# Patient Record
Sex: FEMALE | Race: WHITE | NOT HISPANIC OR LATINO | Employment: FULL TIME | URBAN - NONMETROPOLITAN AREA
[De-identification: names, ages, dates, MRNs, and addresses within clinical notes are randomized per-mention and may not be internally consistent; named-entity substitution may affect disease eponyms.]

---

## 2017-01-02 ENCOUNTER — PRENATAL OFFICE VISIT (OUTPATIENT)
Dept: FAMILY MEDICINE | Facility: CLINIC | Age: 27
End: 2017-01-02
Payer: COMMERCIAL

## 2017-01-02 VITALS
OXYGEN SATURATION: 96 % | HEART RATE: 83 BPM | DIASTOLIC BLOOD PRESSURE: 64 MMHG | TEMPERATURE: 97.8 F | WEIGHT: 136 LBS | BODY MASS INDEX: 22.66 KG/M2 | SYSTOLIC BLOOD PRESSURE: 100 MMHG | HEIGHT: 65 IN

## 2017-01-02 DIAGNOSIS — Z34.02 PRENATAL CARE, FIRST PREGNANCY, SECOND TRIMESTER: ICD-10-CM

## 2017-01-02 DIAGNOSIS — Z34.03 PRENATAL CARE, FIRST PREGNANCY, THIRD TRIMESTER: Primary | ICD-10-CM

## 2017-01-02 LAB
GLUCOSE 1H P 50 G GLC PO SERPL-MCNC: 124 MG/DL (ref 60–129)
HGB BLD-MCNC: 12.3 G/DL (ref 11.7–15.7)

## 2017-01-02 PROCEDURE — 82950 GLUCOSE TEST: CPT | Mod: 90 | Performed by: FAMILY MEDICINE

## 2017-01-02 PROCEDURE — 99207 ZZC PRENATAL VISIT: CPT | Performed by: FAMILY MEDICINE

## 2017-01-02 PROCEDURE — 36415 COLL VENOUS BLD VENIPUNCTURE: CPT | Performed by: FAMILY MEDICINE

## 2017-01-02 PROCEDURE — 99000 SPECIMEN HANDLING OFFICE-LAB: CPT | Performed by: FAMILY MEDICINE

## 2017-01-02 PROCEDURE — 00000218 ZZHCL STATISTIC OBHBG - HEMOGLOBIN: Performed by: FAMILY MEDICINE

## 2017-01-02 PROCEDURE — H1000 PRENATAL CARE ATRISK ASSESSM: HCPCS | Performed by: FAMILY MEDICINE

## 2017-01-02 NOTE — NURSING NOTE
"Chief Complaint   Patient presents with     Prenatal Care     27w 3d        Initial /64 mmHg  Pulse 83  Temp(Src) 97.8  F (36.6  C) (Tympanic)  Ht 5' 4.5\" (1.638 m)  Wt 136 lb (61.689 kg)  BMI 22.99 kg/m2  SpO2 96%  LMP 06/25/2016 Estimated body mass index is 22.99 kg/(m^2) as calculated from the following:    Height as of this encounter: 5' 4.5\" (1.638 m).    Weight as of this encounter: 136 lb (61.689 kg).  BP completed using cuff size: regular    "

## 2017-01-02 NOTE — PROGRESS NOTES
Fatimah is here for her 26 week visit.  She is 27w3d weeks today.  Employment Status:  Full time  Is a .  She is not attending classes.    Fatimah understands the following:  Definition of  labor:   Yes  Warning signs of  labor:  Yes  Palpatation of uterine contractions:  Yes  Warning signs of pregnancy complications:  Yes  Weight gain:  Yes  Nutrition choices (review intake and risks):  Yes  Prevention/treatment of constipation (increase fluids/fiber):  Yes  Discontinue smoking, drug use, ETOH use:  Yes  Decrease strenuous activities (increase rest):  Yes  Consider work/activity/environmental hazards:  Yes  Travel recommendations:  Yes  Sexual activity/intercourse:  Yes  Avoid nipple stimulation:  No  Infant feeding plans:  Yes breast feeding  Car seat:  Yes  Fetal kick counts discussed:  No  Other topics discussed:      Just did her GTT dose    Cvx: closed, long, and thick. 0/0/0    Class schedule given for 2017    Return to clinic 3-4 weeks    Renetta Vegas MD

## 2017-01-02 NOTE — MR AVS SNAPSHOT
After Visit Summary   1/2/2017    Fatimah Roy    MRN: [id number]           Patient Information     Date Of Birth          1990        Visit Information        Provider Department      1/2/2017 1:00 PM Renetta Monroy MD [facility]        Today's Diagnoses     Prenatal care, first pregnancy, third trimester    -  1       Care Instructions    Return to clinic 3-4 weeks        Follow-ups after your visit        Future tests that were ordered for you today     Open Future Orders        Priority Expected Expires Ordered    Glucose tolerance gest screen 1 hour Routine  1/31/2017 1/2/2017    OB hemoglobin Routine  1/31/2017 1/2/2017            Who to contact     If you have questions or need follow up information about today's clinic visit or your schedule please contact [facility] directly at [phone].  Normal or non-critical lab and imaging results will be communicated to you by Nomihart, letter or phone within 4 business days after the clinic has received the results. If you do not hear from us within 7 days, please contact the clinic through Nomihart or phone. If you have a critical or abnormal lab result, we will notify you by phone as soon as possible.  Submit refill requests through Greenopedia or call your pharmacy and they will forward the refill request to us. Please allow 3 business days for your refill to be completed.          Additional Information About Your Visit        MyChart Information     Greenopedia gives you secure access to your electronic health record. If you see a primary care provider, you can also send messages to your care team and make appointments. If you have questions, please call your primary care clinic.  If you do not have a primary care provider, please call [phone] and they will assist you.        Your Vitals Were     Pulse Temperature Height BMI (Body Mass Index) Pulse Oximetry Last Period    83 97.8  F (36.6  C) (Tympanic)  "5' 4.5\" (1.638 m) 22.99 kg/m2 96% 06/25/2016       Blood Pressure from Last 3 Encounters:   01/02/17 100/64   11/29/16 102/62   10/17/16 100/62    Weight from Last 3 Encounters:   01/02/17 136 lb (61.689 kg)   11/29/16 127 lb (57.607 kg)   10/17/16 116 lb (52.617 kg)              We Performed the Following     RISK ASSESSMANT FORM-NORMAL PREGNANCY (to be charged at 28 weeks)        Primary Care Provider Office Phone # Fax #    Kristen Laurent HECTOR Manrique [facility] [phone]       [facility] [address] [facility] [address]        Thank you!     Thank you for choosing [facility]  for your care. Our goal is always to provide you with excellent care. Hearing back from our patients is one way we can continue to improve our services. Please take a few minutes to complete the written survey that you may receive in the mail after your visit with us. Thank you!             Your Updated Medication List - Protect others around you: Learn how to safely use, store and throw away your medicines at www.disposemymeds.org.          This list is accurate as of: 1/2/17  1:41 PM.  Always use your most recent med list.                   Brand Name Dispense Instructions for use    albuterol 108 (90 BASE) MCG/ACT Inhaler    PROAIR HFA/PROVENTIL HFA/VENTOLIN HFA    1 Inhaler    Inhale 2 puffs into the lungs every 6 hours as needed for shortness of breath / dyspnea       fluticasone 50 MCG/ACT spray    FLONASE    1 Bottle    Spray 2 sprays into both nostrils daily       prenatal multivitamin  plus iron 27-0.8 MG Tabs per tablet      Take 1 tablet by mouth daily         "

## 2017-01-09 ENCOUNTER — OFFICE VISIT (OUTPATIENT)
Dept: FAMILY MEDICINE | Facility: CLINIC | Age: 27
End: 2017-01-09
Payer: COMMERCIAL

## 2017-01-09 VITALS
TEMPERATURE: 97.8 F | SYSTOLIC BLOOD PRESSURE: 102 MMHG | BODY MASS INDEX: 23.84 KG/M2 | HEART RATE: 80 BPM | DIASTOLIC BLOOD PRESSURE: 60 MMHG | WEIGHT: 141 LBS

## 2017-01-09 DIAGNOSIS — R10.2 PELVIC CRAMPING IN ANTEPARTUM PERIOD: ICD-10-CM

## 2017-01-09 DIAGNOSIS — R31.29 MICROSCOPIC HEMATURIA: ICD-10-CM

## 2017-01-09 DIAGNOSIS — O26.899 PELVIC CRAMPING IN ANTEPARTUM PERIOD: ICD-10-CM

## 2017-01-09 DIAGNOSIS — Z34.02 PRENATAL CARE, FIRST PREGNANCY, SECOND TRIMESTER: Primary | ICD-10-CM

## 2017-01-09 LAB
ALBUMIN UR-MCNC: ABNORMAL MG/DL
AMORPH CRY #/AREA URNS HPF: ABNORMAL /HPF
APPEARANCE UR: CLEAR
BACTERIA #/AREA URNS HPF: ABNORMAL /HPF
BILIRUB UR QL STRIP: NEGATIVE
COLOR UR AUTO: YELLOW
GLUCOSE UR STRIP-MCNC: NEGATIVE MG/DL
HGB UR QL STRIP: ABNORMAL
KETONES UR STRIP-MCNC: NEGATIVE MG/DL
LEUKOCYTE ESTERASE UR QL STRIP: NEGATIVE
NITRATE UR QL: NEGATIVE
NON-SQ EPI CELLS #/AREA URNS LPF: ABNORMAL /LPF
PH UR STRIP: 6.5 PH (ref 5–7)
RBC #/AREA URNS AUTO: ABNORMAL /HPF (ref 0–2)
SP GR UR STRIP: 1.02 (ref 1–1.03)
URN SPEC COLLECT METH UR: ABNORMAL
UROBILINOGEN UR STRIP-ACNC: 0.2 EU/DL (ref 0.2–1)
WBC #/AREA URNS AUTO: ABNORMAL /HPF (ref 0–2)

## 2017-01-09 PROCEDURE — 81001 URINALYSIS AUTO W/SCOPE: CPT | Performed by: FAMILY MEDICINE

## 2017-01-09 PROCEDURE — 99207 ZZC PRENATAL VISIT: CPT | Performed by: FAMILY MEDICINE

## 2017-01-09 PROCEDURE — 99000 SPECIMEN HANDLING OFFICE-LAB: CPT | Performed by: FAMILY MEDICINE

## 2017-01-09 PROCEDURE — 87086 URINE CULTURE/COLONY COUNT: CPT | Mod: 90 | Performed by: FAMILY MEDICINE

## 2017-01-09 NOTE — NURSING NOTE
"Chief Complaint   Patient presents with     Prenatal Care     28w 3d        Initial /60 mmHg  Pulse 80  Temp(Src) 97.8  F (36.6  C) (Tympanic)  Wt 141 lb (63.957 kg)  LMP 06/25/2016 Estimated body mass index is 23.84 kg/(m^2) as calculated from the following:    Height as of 1/2/17: 5' 4.5\" (1.638 m).    Weight as of this encounter: 141 lb (63.957 kg).  BP completed using cuff size: regular    "

## 2017-01-09 NOTE — PROGRESS NOTES
28w3d   Having pelvic cramping that started around 5 pm yesterday, lasting until this am. No bleeding or fluid leak. Had to lay down. Was painful cramping, uterus didn't get hard. Tried hot bath.   No diarrhea.   No dysuria, some frequency.  Called and we asked her to be seen.   Cvx: 0/0/-4  Results for orders placed or performed in visit on 01/09/17   *UA reflex to Microscopic and Culture ([facility] (except [place] and [place])   Result Value Ref Range    Color Urine Yellow     Appearance Urine Clear     Glucose Urine Negative NEG mg/dL    Bilirubin Urine Negative NEG    Ketones Urine Negative NEG mg/dL    Specific Gravity Urine 1.025 1.003 - 1.035    Blood Urine Moderate (A) NEG    pH Urine 6.5 5.0 - 7.0 pH    Protein Albumin Urine Trace (A) NEG mg/dL    Urobilinogen Urine 0.2 0.2 - 1.0 EU/dL    Nitrite Urine Negative NEG    Leukocyte Esterase Urine Negative NEG    Source Midstream Urine    Urine Microscopic   Result Value Ref Range    WBC Urine O - 2 0 - 2 /HPF    RBC Urine 10-25 (A) 0 - 2 /HPF    Squamous Epithelial /LPF Urine Few FEW /LPF    Bacteria Urine Few (A) NEG /HPF    Amorphous Crystals Moderate (A) NEG /HPF   Urine Culture Aerobic Bacterial   Result Value Ref Range    Specimen Description Midstream Urine     Culture Micro No growth after 11 hours     Micro Report Status Pending       No cervical change  Patient Instructions   Labor and Delivery  [phone]    Drink lots of water  urine culture is pending  Return to clinic as planned later this month    MD Renetta Ledesma MD

## 2017-01-09 NOTE — MR AVS SNAPSHOT
After Visit Summary   1/9/2017    Fatimah Roy    MRN: [id number]           Patient Information     Date Of Birth          1990        Visit Information        Provider Department      1/9/2017 2:20 PM Renetta Monroy MD [facility]        Today's Diagnoses     Prenatal care, first pregnancy, second trimester    -  1     Pelvic cramping in antepartum period         Microscopic hematuria           Care Instructions    Labor and Delivery  [phone]    Drink lots of water  urine culture is pending  Return to clinic as planned later this month        Follow-ups after your visit        Your next 10 appointments already scheduled     Jan 24, 2017  4:20 PM   ESTABLISHED PRENATAL with Renetta Monroy MD   [facility] ([facility])    [address] [facility] [address] [phone]              Who to contact     If you have questions or need follow up information about today's clinic visit or your schedule please contact [facility] directly at [phone].  Normal or non-critical lab and imaging results will be communicated to you by Xplornet Communicationshart, letter or phone within 4 business days after the clinic has received the results. If you do not hear from us within 7 days, please contact the clinic through dev9k or phone. If you have a critical or abnormal lab result, we will notify you by phone as soon as possible.  Submit refill requests through dev9k or call your pharmacy and they will forward the refill request to us. Please allow 3 business days for your refill to be completed.          Additional Information About Your Visit        Xplornet Communicationshart Information     dev9k gives you secure access to your electronic health record. If you see a primary care provider, you can also send messages to your care team and make appointments. If you have questions, please call your primary care clinic.  If you do not have a primary care  provider, please call [phone] and they will assist you.        Care EveryWhere ID     This is your Care EveryWhere ID. This could be used by other organizations to access your [place] medical records  [id number]        Your Vitals Were     Pulse Temperature Last Period             80 97.8  F (36.6  C) (Tympanic) 06/25/2016          Blood Pressure from Last 3 Encounters:   01/09/17 102/60   01/02/17 100/64   11/29/16 102/62    Weight from Last 3 Encounters:   01/09/17 141 lb (63.957 kg)   01/02/17 136 lb (61.689 kg)   11/29/16 127 lb (57.607 kg)              We Performed the Following     *UA reflex to Microscopic and Culture ([facility] (except [place] and [place])     Urine Culture Aerobic Bacterial     Urine Microscopic        Primary Care Provider Office Phone # Fax #    HECTOR Trimble [facility] [phone]       [facility] [address] [place] [address]        Thank you!     Thank you for choosing [facility]  for your care. Our goal is always to provide you with excellent care. Hearing back from our patients is one way we can continue to improve our services. Please take a few minutes to complete the written survey that you may receive in the mail after your visit with us. Thank you!             Your Updated Medication List - Protect others around you: Learn how to safely use, store and throw away your medicines at www.disposemymeds.org.          This list is accurate as of: 1/9/17  3:18 PM.  Always use your most recent med list.                   Brand Name Dispense Instructions for use    albuterol 108 (90 BASE) MCG/ACT Inhaler    PROAIR HFA/PROVENTIL HFA/VENTOLIN HFA    1 Inhaler    Inhale 2 puffs into the lungs every 6 hours as needed for shortness of breath / dyspnea       fluticasone 50 MCG/ACT spray    FLONASE    1 Bottle    Spray 2 sprays into both nostrils daily       prenatal multivitamin  plus iron 27-0.8 MG  Tabs per tablet      Take 1 tablet by mouth daily

## 2017-01-09 NOTE — PATIENT INSTRUCTIONS
Labor and Delivery  [phone]    Drink lots of water  urine culture is pending  Return to clinic as planned later this month

## 2017-01-11 LAB
BACTERIA SPEC CULT: NORMAL
MICRO REPORT STATUS: NORMAL
SPECIMEN SOURCE: NORMAL

## 2017-01-24 ENCOUNTER — PRENATAL OFFICE VISIT (OUTPATIENT)
Dept: FAMILY MEDICINE | Facility: CLINIC | Age: 27
End: 2017-01-24
Payer: COMMERCIAL

## 2017-01-24 VITALS
HEART RATE: 94 BPM | DIASTOLIC BLOOD PRESSURE: 68 MMHG | WEIGHT: 146 LBS | OXYGEN SATURATION: 96 % | SYSTOLIC BLOOD PRESSURE: 102 MMHG | BODY MASS INDEX: 24.68 KG/M2 | TEMPERATURE: 97.4 F

## 2017-01-24 DIAGNOSIS — Z23 NEED FOR VACCINATION: Primary | ICD-10-CM

## 2017-01-24 PROCEDURE — 99207 ZZC PRENATAL VISIT: CPT | Performed by: FAMILY MEDICINE

## 2017-01-24 PROCEDURE — 90471 IMMUNIZATION ADMIN: CPT | Performed by: FAMILY MEDICINE

## 2017-01-24 PROCEDURE — 90715 TDAP VACCINE 7 YRS/> IM: CPT | Performed by: FAMILY MEDICINE

## 2017-01-24 NOTE — MR AVS SNAPSHOT
After Visit Summary   1/24/2017    Fatimah Roy    MRN: [id number]           Patient Information     Date Of Birth          1990        Visit Information        Provider Department      1/24/2017 4:20 PM Renetta Monroy MD [facility]        Care Instructions    See you next Friday        Follow-ups after your visit        Who to contact     If you have questions or need follow up information about today's clinic visit or your schedule please contact [facility] directly at [phone].  Normal or non-critical lab and imaging results will be communicated to you by MyChart, letter or phone within 4 business days after the clinic has received the results. If you do not hear from us within 7 days, please contact the clinic through AgroSavfet or phone. If you have a critical or abnormal lab result, we will notify you by phone as soon as possible.  Submit refill requests through Anemoi Renovables or call your pharmacy and they will forward the refill request to us. Please allow 3 business days for your refill to be completed.          Additional Information About Your Visit        MyChart Information     An[employer]oi [employer]ovables gives you secure access to your electronic health record. If you see a primary care provider, you can also send messages to your care team and make appointments. If you have questions, please call your primary care clinic.  If you do not have a primary care provider, please call [phone] and they will assist you.        Care EveryWhere ID     This is your Care EveryWhere ID. This could be used by other organizations to access your [place] medical records  [id number]        Your Vitals Were     Pulse Temperature Pulse Oximetry Last Period          94 97.4  F (36.3  C) (Tympanic) 96% 06/25/2016         Blood Pressure from Last 3 Encounters:   01/24/17 102/68   01/09/17 102/60   01/02/17 100/64    Weight from Last 3 Encounters:   01/24/17 146 lb (66.225 kg)    01/09/17 141 lb (63.957 kg)   01/02/17 136 lb (61.689 kg)              Today, you had the following     No orders found for display       Primary Care Provider Office Phone # Fax #    Kristen Manrique HECTOR [facility] [phone]       [facility] [address] [facility] [address]        Thank you!     Thank you for choosing [facility]  for your care. Our goal is always to provide you with excellent care. Hearing back from our patients is one way we can continue to improve our services. Please take a few minutes to complete the written survey that you may receive in the mail after your visit with us. Thank you!             Your Updated Medication List - Protect others around you: Learn how to safely use, store and throw away your medicines at www.disposemymeds.org.          This list is accurate as of: 1/24/17  4:38 PM.  Always use your most recent med list.                   Brand Name Dispense Instructions for use    albuterol 108 (90 BASE) MCG/ACT Inhaler    PROAIR HFA/PROVENTIL HFA/VENTOLIN HFA    1 Inhaler    Inhale 2 puffs into the lungs every 6 hours as needed for shortness of breath / dyspnea       fluticasone 50 MCG/ACT spray    FLONASE    1 Bottle    Spray 2 sprays into both nostrils daily       prenatal multivitamin  plus iron 27-0.8 MG Tabs per tablet      Take 1 tablet by mouth daily

## 2017-01-24 NOTE — NURSING NOTE
"Chief Complaint   Patient presents with     Prenatal Care     30w 4d     /68 mmHg  Pulse 94  Temp(Src) 97.4  F (36.3  C) (Tympanic)  Wt 146 lb (66.225 kg)  SpO2 96%  LMP 06/25/2016 Estimated body mass index is 24.68 kg/(m^2) as calculated from the following:    Height as of 1/2/17: 5' 4.5\" (1.638 m).    Weight as of this encounter: 146 lb (66.225 kg).  bp completed using cuff size: regular        "

## 2017-02-07 ENCOUNTER — MYC MEDICAL ADVICE (OUTPATIENT)
Dept: FAMILY MEDICINE | Facility: CLINIC | Age: 27
End: 2017-02-07

## 2017-02-07 NOTE — TELEPHONE ENCOUNTER
Please review and advise message below.  Is this letter ok and for this date?    Thank you  Kristen LIZARRAGA RN

## 2017-02-07 NOTE — Clinical Note
[facility]  [address] [facility] [address]  Phone: [phone]    February 9, 2017    Fatimah Roy  [address] [facility] [address]            To Whom it May Concern,    Fatimah Roy is pregnant. Her maternity leave will begin March 27, 2017      Sincerely,      Renetta Vegas MD/Kristen TRINH

## 2017-02-13 ENCOUNTER — PRENATAL OFFICE VISIT (OUTPATIENT)
Dept: FAMILY MEDICINE | Facility: CLINIC | Age: 27
End: 2017-02-13
Payer: COMMERCIAL

## 2017-02-13 VITALS
WEIGHT: 151 LBS | OXYGEN SATURATION: 99 % | SYSTOLIC BLOOD PRESSURE: 102 MMHG | HEART RATE: 84 BPM | TEMPERATURE: 97.5 F | DIASTOLIC BLOOD PRESSURE: 68 MMHG | BODY MASS INDEX: 25.52 KG/M2

## 2017-02-13 DIAGNOSIS — Z34.03 PRENATAL CARE, FIRST PREGNANCY, THIRD TRIMESTER: Primary | ICD-10-CM

## 2017-02-13 PROCEDURE — 99207 ZZC PRENATAL VISIT: CPT | Performed by: FAMILY MEDICINE

## 2017-02-13 RX ORDER — LORATADINE 10 MG/1
10 TABLET ORAL DAILY
COMMUNITY
End: 2017-08-18

## 2017-02-13 NOTE — LETTER
[facility]  [address]  [facility] [address]  Phone: [phone]    February 13, 2017    Re: Fatimah Roy  [address] [facility] [address]              To Whom It May Concern:         I'm writing this letter on behalf of my patient, Fatimah Roy. This is to confirm that she is pregnant, and her Estimated Date of Delivery: Mar 31, 2017.   She anticipates working until March 27, 2017.       It is my pleasure to participate in Fatimah's health care needs. Please feel free to call if any questions or concerns.             Sincerely,            eRnetta Vegas MD

## 2017-02-13 NOTE — NURSING NOTE
"Chief Complaint   Patient presents with     Prenatal Care     33w 3d        Initial /68 (BP Location: Left arm, Patient Position: Chair, Cuff Size: Adult Regular)  Pulse 84  Temp 97.5  F (36.4  C) (Tympanic)  Wt 151 lb (68.5 kg)  LMP 06/25/2016  SpO2 99%  BMI 25.52 kg/m2 Estimated body mass index is 25.52 kg/(m^2) as calculated from the following:    Height as of 1/2/17: 5' 4.5\" (1.638 m).    Weight as of this encounter: 151 lb (68.5 kg).  Medication Reconciliation: complete    Health Maintenance that is potentially due pending provider review:  NONE    n/a      "

## 2017-02-13 NOTE — PROGRESS NOTES
33w3d  She reports a lot of swelling in her legs and hands. Her joints have been achy as well. Is on her feet all day teaching.  No contractions, bleeding, or fluid leak.  Return to clinic 2 weeks  Make ob apt  Renetta Vegas MD

## 2017-02-13 NOTE — MR AVS SNAPSHOT
After Visit Summary   2/13/2017    Fatimah Roy    MRN: [id number]           Patient Information     Date Of Birth          1990        Visit Information        Provider Department      2/13/2017 5:20 PM Renetta Monroy MD [facility]        Today's Diagnoses     Prenatal care, first pregnancy, third trimester    -  1      Care Instructions    See you in 2 weeks        Follow-ups after your visit        Additional Services     OB/GYN REFERRAL       Your provider has referred you to:  [facility]: [facility] [phone]   [email or web address]    Please be aware that coverage of these services is subject to the terms and limitations of your health insurance plan.  Call member services at your health plan with any benefit or coverage questions.      Please bring the following with you to your appointment:    (1) Any X-Rays, CTs or MRIs which have been performed.  Contact the facility where they were done to arrange for  prior to your scheduled appointment.   (2) List of current medications   (3) This referral request   (4) Any documents/labs given to you for this referral                  Follow-up notes from your care team     Return in about 2 weeks (around 2/27/2017).      Who to contact     If you have questions or need follow up information about today's clinic visit or your schedule please contact [facility] directly at [phone].  Normal or non-critical lab and imaging results will be communicated to you by MyChart, letter or phone within 4 business days after the clinic has received the results. If you do not hear from us within 7 days, please contact the clinic through MyChart or phone. If you have a critical or abnormal lab result, we will notify you by phone as soon as possible.  Submit refill requests through RotaPost or call your pharmacy and they will forward the refill request to us. Please allow 3  business days for your refill to be completed.          Additional Information About Your Visit        MyChart Information     Media Chaperone gives you secure access to your electronic health record. If you see a primary care provider, you can also send messages to your care team and make appointments. If you have questions, please call your primary care clinic.  If you do not have a primary care provider, please call [phone] and they will assist you.        Care EveryWhere ID     This is your Care EveryWhere ID. This could be used by other organizations to access your [place] medical records  [id number]        Your Vitals Were     Pulse Temperature Last Period Pulse Oximetry BMI (Body Mass Index)       84 97.5  F (36.4  C) (Tympanic) 06/25/2016 99% 25.52 kg/m2        Blood Pressure from Last 3 Encounters:   02/13/17 102/68   01/24/17 102/68   01/09/17 102/60    Weight from Last 3 Encounters:   02/13/17 151 lb (68.5 kg)   01/24/17 146 lb (66.2 kg)   01/09/17 141 lb (64 kg)              We Performed the Following     OB/GYN REFERRAL        Primary Care Provider Office Phone # Fax #    HECTOR Trimble [facility] [phone]       [facility] [address] [place] [address]        Thank you!     Thank you for choosing [facility]  for your care. Our goal is always to provide you with excellent care. Hearing back from our patients is one way we can continue to improve our services. Please take a few minutes to complete the written survey that you may receive in the mail after your visit with us. Thank you!             Your Updated Medication List - Protect others around you: Learn how to safely use, store and throw away your medicines at www.disposemymeds.org.          This list is accurate as of: 2/13/17  6:14 PM.  Always use your most recent med list.                   Brand Name Dispense Instructions for use    albuterol 108 (90 BASE) MCG/ACT Inhaler    PROAIR  HFA/PROVENTIL HFA/VENTOLIN HFA    1 Inhaler    Inhale 2 puffs into the lungs every 6 hours as needed for shortness of breath / dyspnea       CLARITIN 10 MG tablet   Generic drug:  loratadine      Take 10 mg by mouth daily       fluticasone 50 MCG/ACT spray    FLONASE    1 Bottle    Spray 2 sprays into both nostrils daily       prenatal multivitamin  plus iron 27-0.8 MG Tabs per tablet      Take 1 tablet by mouth daily

## 2017-03-03 ENCOUNTER — PRENATAL OFFICE VISIT (OUTPATIENT)
Dept: FAMILY MEDICINE | Facility: CLINIC | Age: 27
End: 2017-03-03
Payer: COMMERCIAL

## 2017-03-03 VITALS
WEIGHT: 160 LBS | TEMPERATURE: 97.7 F | HEART RATE: 80 BPM | SYSTOLIC BLOOD PRESSURE: 102 MMHG | BODY MASS INDEX: 27.04 KG/M2 | DIASTOLIC BLOOD PRESSURE: 64 MMHG

## 2017-03-03 DIAGNOSIS — Z34.03 PRENATAL CARE, FIRST PREGNANCY, THIRD TRIMESTER: Primary | ICD-10-CM

## 2017-03-03 PROCEDURE — 99207 ZZC PRENATAL VISIT: CPT | Performed by: FAMILY MEDICINE

## 2017-03-03 PROCEDURE — 87653 STREP B DNA AMP PROBE: CPT | Performed by: FAMILY MEDICINE

## 2017-03-03 NOTE — MR AVS SNAPSHOT
After Visit Summary   3/3/2017    Fatimah Roy    MRN: [id number]           Patient Information     Date Of Birth          1990        Visit Information        Provider Department      3/3/2017 3:00 PM Renetta Monroy MD [facility]        Today's Diagnoses     Prenatal care, first pregnancy, third trimester    -  1      Care Instructions    Apt with OB weekly from now on  Can't wait to see the baby!!        Follow-ups after your visit        Additional Services     OB/GYN REFERRAL       Your provider has referred you to:  [facility]: [facility] [phone]   [email or web address]    Please be aware that coverage of these services is subject to the terms and limitations of your health insurance plan.  Call member services at your health plan with any benefit or coverage questions.      Please bring the following to your appointment:  >>   Any x-rays, CTs or MRIs which have been performed.  Contact the facility where they were done to arrange for  prior to your scheduled appointment.  Any new CT, MRI or other procedures ordered by your specialist must be performed at a [facility] facility or coordinated by your clinic's referral office.    >>   List of current medications   >>   This referral request   >>   Any documents/labs given to you for this referral                  Who to contact     If you have questions or need follow up information about today's clinic visit or your schedule please contact [facility] directly at [phone].  Normal or non-critical lab and imaging results will be communicated to you by MyChart, letter or phone within 4 business days after the clinic has received the results. If you do not hear from us within 7 days, please contact the clinic through MyChart or phone. If you have a critical or abnormal lab result, we will notify you by phone as soon as possible.  Submit refill requests  through [employer] or call your pharmacy and they will forward the refill request to us. Please allow 3 business days for your refill to be completed.          Additional Information About Your Visit        [email or web address] Information     [employer] gives you secure access to your electronic health record. If you see a primary care provider, you can also send messages to your care team and make appointments. If you have questions, please call your primary care clinic.  If you do not have a primary care provider, please call [phone] and they will assist you.        Care EveryWhere ID     This is your Care EveryWhere ID. This could be used by other organizations to access your [place] medical records  [id number]        Your Vitals Were     Pulse Temperature Last Period BMI (Body Mass Index)          80 97.7  F (36.5  C) (Tympanic) 06/25/2016 27.04 kg/m2         Blood Pressure from Last 3 Encounters:   03/03/17 102/64   02/13/17 102/68   01/24/17 102/68    Weight from Last 3 Encounters:   03/03/17 160 lb (72.6 kg)   02/13/17 151 lb (68.5 kg)   01/24/17 146 lb (66.2 kg)              We Performed the Following     OB/GYN REFERRAL     Strep, Group B by [facility]        Primary Care Provider Office Phone # Fax #    HECTOR Trimble [facility] [phone]       [facility] [address] [place] [address]        Thank you!     Thank you for choosing [facility]  for your care. Our goal is always to provide you with excellent care. Hearing back from our patients is one way we can continue to improve our services. Please take a few minutes to complete the written survey that you may receive in the mail after your visit with us. Thank you!             Your Updated Medication List - Protect others around you: Learn how to safely use, store and throw away your medicines at www.disposemymeds.org.          This list is accurate as of: 3/3/17  3:11 PM.  Always use your most recent med list.                    Brand Name Dispense Instructions for use    albuterol 108 (90 BASE) MCG/ACT Inhaler    PROAIR HFA/PROVENTIL HFA/VENTOLIN HFA    1 Inhaler    Inhale 2 puffs into the lungs every 6 hours as needed for shortness of breath / dyspnea       CLARITIN 10 MG tablet   Generic drug:  loratadine      Take 10 mg by mouth daily       fluticasone 50 MCG/ACT spray    FLONASE    1 Bottle    Spray 2 sprays into both nostrils daily       prenatal multivitamin  plus iron 27-0.8 MG Tabs per tablet      Take 1 tablet by mouth daily

## 2017-03-03 NOTE — PROGRESS NOTES
36w0d  No contractions, bleeding, fluid leak.  Group Beta Strep taken  Cvx: 0/50/-3  Will follow up with OB from now on    Renetta Monroy MD   [facility]

## 2017-03-03 NOTE — NURSING NOTE
"Chief Complaint   Patient presents with     Prenatal Care     36w 0d        Initial /64 (BP Location: Right arm, Patient Position: Chair, Cuff Size: Adult Regular)  Pulse 80  Temp 97.7  F (36.5  C) (Tympanic)  Wt 160 lb (72.6 kg)  LMP 06/25/2016  BMI 27.04 kg/m2 Estimated body mass index is 27.04 kg/(m^2) as calculated from the following:    Height as of 1/2/17: 5' 4.5\" (1.638 m).    Weight as of this encounter: 160 lb (72.6 kg).  Medication Reconciliation: complete    Health Maintenance that is potentially due pending provider review:  NONE    n/a    "

## 2017-03-04 LAB
GP B STREP DNA SPEC QL NAA+PROBE: NORMAL
SPECIMEN SOURCE: NORMAL

## 2017-03-08 ENCOUNTER — PRENATAL OFFICE VISIT (OUTPATIENT)
Dept: OBGYN | Facility: CLINIC | Age: 27
End: 2017-03-08
Payer: COMMERCIAL

## 2017-03-08 VITALS
SYSTOLIC BLOOD PRESSURE: 121 MMHG | HEART RATE: 81 BPM | HEIGHT: 65 IN | WEIGHT: 161.6 LBS | BODY MASS INDEX: 26.92 KG/M2 | DIASTOLIC BLOOD PRESSURE: 75 MMHG

## 2017-03-08 DIAGNOSIS — Z34.03 PRENATAL CARE, FIRST PREGNANCY, THIRD TRIMESTER: Primary | ICD-10-CM

## 2017-03-08 PROCEDURE — 99207 ZZC PRENATAL VISIT: CPT | Performed by: OBSTETRICS & GYNECOLOGY

## 2017-03-08 NOTE — MR AVS SNAPSHOT
After Visit Summary   3/8/2017    Fatimah Roy    MRN: [id number]           Patient Information     Date Of Birth          1990        Visit Information        Provider Department      3/8/2017 2:30 PM Alfredo Ruff MD [facility]        Today's Diagnoses     Prenatal care, first pregnancy, third trimester    -  1       Follow-ups after your visit        Follow-up notes from your care team     Return in about 1 week (around 3/15/2017).      Your next 10 appointments already scheduled     Mar 16, 2017  3:30 PM CDT   ESTABLISHED PRENAT[facility]uent[facility]    [address] [address]   [phone]            Mar 23, 2017  4:00 PM CDT   ESTABLISHED PRENATAL with Chey Gilbert MD   [facility]    [address] [address]   [phone]            Mar 30, 2017  1:30 PM CDT   ESTABLISHED PRENATAL with Alfredo Ruff MD   [facility]    [address] [address]   [phone]              Who to contact     If you have questions or need follow up information about today's clinic visit or your schedule please contact [facility] directly at [phone].  Normal or non-critical lab and imaging results will be communicated to you by MyChart, letter or phone within 4 business days after the clinic has received the results. If you do not hear from us within 7 days, please contact the clinic through MyChart or phone. If you have a critical or abnormal lab result, we will notify you by phone as soon as possible.  Submit refill requests through [facility] [employer] or call your pharmacy and they will forward the refill request to us. Please allow 3 business days for your refill to be completed.          Additional Information About Your Visit        MyChart  "Information     [employer] gives you secure access to your electronic health record. If you see a primary care provider, you can also send messages to your care team and make appointments. If you have questions, please call your primary care clinic.  If you do not have a primary care provider, please call [phone] and they will assist you.        Care EveryWhere ID     This is your Care EveryWhere ID. This could be used by other organizations to access your [place] medical records  [id number]        Your Vitals Were     Pulse Height Last Period BMI (Body Mass Index)          81 5' 4.5\" (1.638 m) 06/25/2016 27.31 kg/m2         Blood Pressure from Last 3 Encounters:   03/08/17 121/75   03/03/17 102/64   02/13/17 102/68    Weight from Last 3 Encounters:   03/08/17 161 lb 9.6 oz (73.3 kg)   03/03/17 160 lb (72.6 kg)   02/13/17 151 lb (68.5 kg)              Today, you had the following     No orders found for display         Today's Medication Changes          These changes are accurate as of: 3/8/17 11:59 PM.  If you have any questions, ask your nurse or doctor.               Stop taking these medicines if you haven't already. Please contact your care team if you have questions.     fluticasone 50 MCG/ACT spray   Commonly known as:  FLONASE   Stopped by:  Alfredo Ruff MD                    Primary Care Provider Office Phone # Fax #    Kristen HECTOR [facility] [phone]       [address] [address]        Thank you!     Thank you for choosing [facility]  for your care. Our goal is always to provide you with excellent care. Hearing back from our patients is one way we can continue to improve our services. Please take a few minutes to complete the written survey that you may receive in the mail after your visit with us. Thank you!             Your Updated Medication List - Protect others around you: Learn how to safely use, store and " throw away your medicines at www.disposemymeds.org.          This list is accurate as of: 3/8/17 11:59 PM.  Always use your most recent med list.                   Brand Name Dispense Instructions for use    albuterol 108 (90 BASE) MCG/ACT Inhaler    PROAIR HFA/PROVENTIL HFA/VENTOLIN HFA    1 Inhaler    Inhale 2 puffs into the lungs every 6 hours as needed for shortness of breath / dyspnea       CLARITIN 10 MG tablet   Generic drug:  loratadine      Take 10 mg by mouth daily       prenatal multivitamin  plus iron 27-0.8 MG Tabs per tablet      Take 1 tablet by mouth daily

## 2017-03-08 NOTE — NURSING NOTE
"Initial /75 (BP Location: Right arm, Patient Position: Chair, Cuff Size: Adult Regular)  Pulse 81  Ht 5' 4.5\" (1.638 m)  Wt 161 lb 9.6 oz (73.3 kg)  LMP 06/25/2016  BMI 27.31 kg/m2 Estimated body mass index is 27.31 kg/(m^2) as calculated from the following:    Height as of this encounter: 5' 4.5\" (1.638 m).    Weight as of this encounter: 161 lb 9.6 oz (73.3 kg). .      "

## 2017-03-09 NOTE — PROGRESS NOTES
"CC: Prenatal visit    S: She is here with her   They have been seen in [place] by DR Monroy  Pregnancy has been uncomplicated  She has good FM   No LOF   NO VB  They would like to [facility]  O: /75 (BP Location: Right arm, Patient Position: Chair, Cuff Size: Adult Regular)  Pulse 81  Ht 5' 4.5\" (1.638 m)  Wt 161 lb 9.6 oz (73.3 kg)  LMP 06/25/2016  BMI 27.31 kg/m2  See above table    A: IUP @ 36+5 week EGA    P RTC 1 week  Labor precautions and phone numbers shared  Alferdo Ruff      "

## 2017-03-16 ENCOUNTER — PRENATAL OFFICE VISIT (OUTPATIENT)
Dept: OBGYN | Facility: CLINIC | Age: 27
End: 2017-03-16
Payer: COMMERCIAL

## 2017-03-16 VITALS
HEIGHT: 65 IN | DIASTOLIC BLOOD PRESSURE: 81 MMHG | SYSTOLIC BLOOD PRESSURE: 127 MMHG | WEIGHT: 165.4 LBS | BODY MASS INDEX: 27.56 KG/M2 | HEART RATE: 84 BPM

## 2017-03-16 DIAGNOSIS — Z34.03 PRENATAL CARE, FIRST PREGNANCY, THIRD TRIMESTER: Primary | ICD-10-CM

## 2017-03-16 PROCEDURE — 99207 ZZC PRENATAL VISIT: CPT | Performed by: OBSTETRICS & GYNECOLOGY

## 2017-03-16 NOTE — NURSING NOTE
"Initial /81 (BP Location: Left arm, Patient Position: Chair, Cuff Size: Adult Regular)  Pulse 84  Ht 5' 4.5\" (1.638 m)  Wt 165 lb 6.4 oz (75 kg)  LMP 06/25/2016  BMI 27.95 kg/m2 Estimated body mass index is 27.95 kg/(m^2) as calculated from the following:    Height as of this encounter: 5' 4.5\" (1.638 m).    Weight as of this encounter: 165 lb 6.4 oz (75 kg). .      "

## 2017-03-16 NOTE — LETTER
[facility]  [address] [facility] [address]  Phone: [phone]    March 16, 2017      RE: Fatimah Roy  [address] [facility] [address]       To whom it may concern:    Fatimah Roy was seen in our clinic today. Please excuse her from work as of 3/27/17 through the remainder of her pregnancy.          Sincerely,    Micky Cifuentes MD

## 2017-03-16 NOTE — MR AVS SNAPSHOT
After Visit Summary   3/16/2017    Fatimah Roy    MRN: [id number]           Patient Information     Date Of Birth          1990        Visit Information        Provider Department      3/16/2017 3:30 PM Micky Cifuentes MD [facility]        Today's Diagnoses     Prenatal care, first pregnancy, third trimester    -  1       Follow-ups after your visit        Your next 10 appointments already scheduled     Mar 23, 2017  4:00 PM CDT   ESTABLISHED PRENATAL with Chey Gilbert MD   [facility]    [address] [facility] [address]   [phone]            Mar 30, 2017  1:30 PM CDT   ESTABLISHED PRENATAL with Alfredo Ruff MD   [facility]    [address] [facility] [address] [facility] [address]   [phone]              Who to contact     If you have questions or need follow up information about today's clinic visit or your schedule please contact [facility] directly at [phone].  Normal or non-critical lab and imaging results will be communicated to you by [employer]hart, letter or phone within 4 business days after the clinic has received the results. If you do not hear from us within 7 days, please contact the clinic through [employer]hart or phone. If you have a critical or abnormal lab result, we will notify you by phone as soon as possible.  Submit refill requests through [employer] or call your pharmacy and they will forward the refill request to us. Please allow 3 business days for your refill to be completed.          Additional Information About Your Visit        MyChart Information     [employer] gives you secure access to your electronic health record. If you see a primary care provider, you can also send messages to your care team and make appointments. If you have questions, please call your primary care clinic.  If you do not have a primary care provider,  "please call [phone] and they will assist you.        Care EveryWhere ID     This is your Care EveryWhere ID. This could be used by other organizations to access your [place] medical records  [id number]        Your Vitals Were     Pulse Height Last Period BMI (Body Mass Index)          84 5' 4.5\" (1.638 m) 06/25/2016 27.95 kg/m2         Blood Pressure from Last 3 Encounters:   03/16/17 127/81   03/08/17 121/75   03/03/17 102/64    Weight from Last 3 Encounters:   03/16/17 165 lb 6.4 oz (75 kg)   03/08/17 161 lb 9.6 oz (73.3 kg)   03/03/17 160 lb (72.6 kg)              Today, you had the following     No orders found for display       Primary Care Provider Office Phone # Fax #    HECTOR Trimble [facility] [phone]       [facility] [address] [place] [address]        Thank you!     Thank you for choosing [facility]  for your care. Our goal is always to provide you with excellent care. Hearing back from our patients is one way we can continue to improve our services. Please take a few minutes to complete the written survey that you may receive in the mail after your visit with us. Thank you!             Your Updated Medication List - Protect others around you: Learn how to safely use, store and throw away your medicines at www.disposemymeds.org.          This list is accurate as of: 3/16/17  4:03 PM.  Always use your most recent med list.                   Brand Name Dispense Instructions for use    albuterol 108 (90 BASE) MCG/ACT Inhaler    PROAIR HFA/PROVENTIL HFA/VENTOLIN HFA    1 Inhaler    Inhale 2 puffs into the lungs every 6 hours as needed for shortness of breath / dyspnea       CLARITIN 10 MG tablet   Generic drug:  loratadine      Take 10 mg by mouth daily       prenatal multivitamin  plus iron 27-0.8 MG Tabs per tablet      Take 1 tablet by mouth daily         "

## 2017-03-16 NOTE — PROGRESS NOTES
"Doing well.   Feels pelvic pressure. Requests letter off of work from Mon 3/27 until delivery  Denies VB, ctx, LOF. +FM  /81 (BP Location: Left arm, Patient Position: Chair, Cuff Size: Adult Regular)  Pulse 84  Ht 5' 4.5\" (1.638 m)  Wt 165 lb 6.4 oz (75 kg)  LMP 2016  BMI 27.95 kg/m2  General Appearance: NAD  Abdomen: Gravid, NT  Refer to flow sheet above.   A/P: 26 year old  at 37w6d  -- GBS negative status reviewed  -- letter provided  -- Discussed with Fatimah Roy, the following; indications; the agents and methods of labor augmentation, including risks, benefits, and alternative approaches; and the possible need for  birth. EFW is AGA.The Labor Induction:what you need to know information sheet was made available to her. Questions and concerns were addressed and patient agrees to above if necessary during the course of her labor.  -- labor precautions reviewed  RTC in 1 week            Micky Cifuentes MD  [facility]            "

## 2017-03-23 ENCOUNTER — PRENATAL OFFICE VISIT (OUTPATIENT)
Dept: OBGYN | Facility: CLINIC | Age: 27
End: 2017-03-23
Payer: COMMERCIAL

## 2017-03-23 VITALS
HEART RATE: 82 BPM | WEIGHT: 165.6 LBS | BODY MASS INDEX: 27.59 KG/M2 | SYSTOLIC BLOOD PRESSURE: 120 MMHG | HEIGHT: 65 IN | DIASTOLIC BLOOD PRESSURE: 80 MMHG

## 2017-03-23 DIAGNOSIS — Z34.03 PRENATAL CARE, FIRST PREGNANCY, THIRD TRIMESTER: Primary | ICD-10-CM

## 2017-03-23 PROCEDURE — 99207 ZZC PRENATAL VISIT: CPT | Performed by: OBSTETRICS & GYNECOLOGY

## 2017-03-23 NOTE — LETTER
SURGERYPLANNING/SCHEDULING WORKSHEET                                   Fatimah Roy                :  1990  MRN:  [id number]  Phone: [phone] (home)    Same Day Surgery [phone]   Surgeon: Renetta Saeed M.D.   Diagnosis:   breech  Allergies:  Nkda [no known drug allergies]   A preoperative evaluation and physical will be done by this office.   ====================================================  Surgical Procedure:  OB-GYN  Section  Length of Procedure:  45 min  Type of anesthesia:  Spinal  The proposed surgical procedure is considered INTERMEDIATE risk.  Date of Procedure:________________    Time: _____________________       Informed Consent Obtained and Signed:  NO  ====================================================  Instructions to Same Day Surgery Staff  Pneumoboots  Special Equipment: None  Preop Antibiotic:  Ancef 2gm IV pre-op within 30min prior to incision for all C-Sections  Preop Pain Meds:  None  PreOp Orders:  Routine Standing Orders.  Lab and Xray per patient Health History : CBC, Type & Screen  ====================================================  Instructions to the patient:  Preop physical exam scheduled (within 30 days or 7 days prior) with:   ____________________  Clinic:  ____________________                                         Date______________Time_________________________  Come to the hospital at: ________________________________  HOME PREPARATION:   Shower with Hibiclens the night before and the morning of surgery, gently cleaning skin from neck to feet  Bathe and brush teeth the morning of surgery.  Take medications with a sip of water the morning of surgery:   Check with DrMahsa if taking insulin.  May have  a light meal, toast and clear liquids, up to 6 hrs before surgery  May have clear liquids (liquids one can read through) up to 2 hrs before surgery  NOTHING after 2 hrs before surgery  Renetta Saeed M.D.   3/23/2017

## 2017-03-23 NOTE — NURSING NOTE
"Chief Complaint   Patient presents with     Prenatal Care       Initial /80 (BP Location: Right arm, Patient Position: Chair, Cuff Size: Adult Large)  Pulse 82  Ht 5' 4.5\" (1.638 m)  Wt 165 lb 9.6 oz (75.1 kg)  LMP 06/25/2016  Breastfeeding? No  BMI 27.99 kg/m2 Estimated body mass index is 27.99 kg/(m^2) as calculated from the following:    Height as of this encounter: 5' 4.5\" (1.638 m).    Weight as of this encounter: 165 lb 9.6 oz (75.1 kg).  Medication Reconciliation: complete   Lisa Prince CMA      "

## 2017-03-23 NOTE — MR AVS SNAPSHOT
After Visit Summary   3/23/2017    Fatimah Roy    MRN: [id number]           Patient Information     Date Of Birth          1990        Visit Information        Provider Department      3/23/2017 4:00 PM Chey Gilbert MD [facility]        Today's Diagnoses     Prenatal care, first pregnancy, third trimester    -  1       Follow-ups after your visit        Your next 10 appointments already scheduled     Mar 24, 2017   Procedure with Renetta Saeed MD   [facility] (--)    [address] [facility] [address]   [phone]           The medical center is located at [address] [facility] (between [address] in [place], [place] north of [place]).            Mar 30, 2017  1:30 PM CDT   ESTABLISHED PRENATAL with Alfredo Ruff MD   [facility]    [address] [facility] [address] [phone]              Who to contact     If you have questions or need follow up information about today's clinic visit or your schedule please contact [facility] directly at [phone].  Normal or non-critical lab and imaging results will be communicated to you by MyChart, letter or phone within 4 business days after the clinic has received the results. If you do not hear from us within 7 days, please contact the clinic through MyChart or phone. If you have a critical or abnormal lab result, we will notify you by phone as soon as possible.  Submit refill requests through [employer] or call your pharmacy and they will forward the refill request to us. Please allow 3 business days for your refill to be completed.          Additional Information About Your Visit        MyChart Information     [employer] gives you secure access to your electronic health record. If you see a primary care provider, you can also send messages to your care team and make appointments. If you have questions,  "please call your primary care clinic.  If you do not have a primary care provider, please call [phone] and they will assist you.        Care EveryWhere ID     This is your Care EveryWhere ID. This could be used by other organizations to access your [place] medical records  [id number]        Your Vitals Were     Pulse Height Last Period Breastfeeding? BMI (Body Mass Index)       82 5' 4.5\" (1.638 m) 06/25/2016 No 27.99 kg/m2        Blood Pressure from Last 3 Encounters:   03/23/17 120/80   03/16/17 127/81   03/08/17 121/75    Weight from Last 3 Encounters:   03/23/17 165 lb 9.6 oz (75.1 kg)   03/16/17 165 lb 6.4 oz (75 kg)   03/08/17 161 lb 9.6 oz (73.3 kg)              Today, you had the following     No orders found for display       Primary Care Provider Office Phone # Fax #    HECTOR Trimble [facility] [phone]       [facility] [address] [place] [address]        Thank you!     Thank you for choosing [facility]  for your care. Our goal is always to provide you with excellent care. Hearing back from our patients is one way we can continue to improve our services. Please take a few minutes to complete the written survey that you may receive in the mail after your visit with us. Thank you!             Your Updated Medication List - Protect others around you: Learn how to safely use, store and throw away your medicines at www.disposemymeds.org.          This list is accurate as of: 3/23/17  5:53 PM.  Always use your most recent med list.                   Brand Name Dispense Instructions for use    albuterol 108 (90 BASE) MCG/ACT Inhaler    PROAIR HFA/PROVENTIL HFA/VENTOLIN HFA    1 Inhaler    Inhale 2 puffs into the lungs every 6 hours as needed for shortness of breath / dyspnea       CLARITIN 10 MG tablet   Generic drug:  loratadine      Take 10 mg by mouth daily       prenatal multivitamin  plus iron 27-0.8 MG Tabs per tablet      Take 1 tablet by " mouth daily

## 2017-03-23 NOTE — PROGRESS NOTES
Discussed ECV versus primary .  Patient and  discussed and decided to proceed with primary .  Will schedule for 3/24/17 @ 1123    Renetta Saeed M.D.

## 2017-03-23 NOTE — PROGRESS NOTES
Doing well, feels much better now that she's on spring break and not on her feet all the time every day.  +FM, rare ctx, no VB or LOF.    26 year old  at 38w6d   - breech on leopold's and confirmed on BSUS - discussed ECV with possible CS tomorrow at 39+0  - will involve Dr Saeed at this point as she is on call tomorrow    Chey Gilbert MD, MPH  [facility]

## 2017-03-24 ENCOUNTER — ANESTHESIA EVENT (OUTPATIENT)
Dept: SURGERY | Facility: CLINIC | Age: 27
End: 2017-03-24
Payer: COMMERCIAL

## 2017-03-24 ENCOUNTER — ANESTHESIA (OUTPATIENT)
Dept: SURGERY | Facility: CLINIC | Age: 27
End: 2017-03-24
Payer: COMMERCIAL

## 2017-03-24 ENCOUNTER — HOSPITAL ENCOUNTER (INPATIENT)
Facility: CLINIC | Age: 27
LOS: 3 days | Discharge: HOME | End: 2017-03-27
Attending: OBSTETRICS & GYNECOLOGY | Admitting: OBSTETRICS & GYNECOLOGY
Payer: COMMERCIAL

## 2017-03-24 DIAGNOSIS — Z98.891 S/P C-SECTION: Primary | ICD-10-CM

## 2017-03-24 LAB
ABO + RH BLD: NORMAL
ABO + RH BLD: NORMAL
BLD GP AB SCN SERPL QL: NORMAL
BLOOD BANK CMNT PATIENT-IMP: NORMAL
HGB BLD-MCNC: 13.1 G/DL (ref 11.7–15.7)
SPECIMEN EXP DATE BLD: NORMAL
T PALLIDUM IGG+IGM SER QL: NEGATIVE

## 2017-03-24 PROCEDURE — 25800025 ZZH RX 258: Performed by: OBSTETRICS & GYNECOLOGY

## 2017-03-24 PROCEDURE — 25000128 H RX IP 250 OP 636: Performed by: NURSE ANESTHETIST, CERTIFIED REGISTERED

## 2017-03-24 PROCEDURE — 59510 CESAREAN DELIVERY: CPT | Performed by: OBSTETRICS & GYNECOLOGY

## 2017-03-24 PROCEDURE — 25800025 ZZH RX 258: Performed by: NURSE ANESTHETIST, CERTIFIED REGISTERED

## 2017-03-24 PROCEDURE — 71000027 ZZH RECOVERY PHASE 2 EACH 15 MINS: Performed by: OBSTETRICS & GYNECOLOGY

## 2017-03-24 PROCEDURE — 36000056 ZZH SURGERY LEVEL 3 1ST 30 MIN: Performed by: OBSTETRICS & GYNECOLOGY

## 2017-03-24 PROCEDURE — 25000132 ZZH RX MED GY IP 250 OP 250 PS 637: Performed by: OBSTETRICS & GYNECOLOGY

## 2017-03-24 PROCEDURE — 86901 BLOOD TYPING SEROLOGIC RH(D): CPT | Performed by: OBSTETRICS & GYNECOLOGY

## 2017-03-24 PROCEDURE — 36000058 ZZH SURGERY LEVEL 3 EA 15 ADDTL MIN: Performed by: OBSTETRICS & GYNECOLOGY

## 2017-03-24 PROCEDURE — 37000008 ZZH ANESTHESIA TECHNICAL FEE, 1ST 30 MIN: Performed by: OBSTETRICS & GYNECOLOGY

## 2017-03-24 PROCEDURE — 71000012 ZZH RECOVERY PHASE 1 LEVEL 1 FIRST HR: Performed by: OBSTETRICS & GYNECOLOGY

## 2017-03-24 PROCEDURE — 86900 BLOOD TYPING SEROLOGIC ABO: CPT | Performed by: OBSTETRICS & GYNECOLOGY

## 2017-03-24 PROCEDURE — 85018 HEMOGLOBIN: CPT | Performed by: OBSTETRICS & GYNECOLOGY

## 2017-03-24 PROCEDURE — 27210794 ZZH OR GENERAL SUPPLY STERILE: Performed by: OBSTETRICS & GYNECOLOGY

## 2017-03-24 PROCEDURE — 25000125 ZZHC RX 250: Performed by: NURSE ANESTHETIST, CERTIFIED REGISTERED

## 2017-03-24 PROCEDURE — 25000128 H RX IP 250 OP 636: Performed by: OBSTETRICS & GYNECOLOGY

## 2017-03-24 PROCEDURE — 37000009 ZZH ANESTHESIA TECHNICAL FEE, EACH ADDTL 15 MIN: Performed by: OBSTETRICS & GYNECOLOGY

## 2017-03-24 PROCEDURE — 36415 COLL VENOUS BLD VENIPUNCTURE: CPT | Performed by: OBSTETRICS & GYNECOLOGY

## 2017-03-24 PROCEDURE — 86850 RBC ANTIBODY SCREEN: CPT | Performed by: OBSTETRICS & GYNECOLOGY

## 2017-03-24 PROCEDURE — 86780 TREPONEMA PALLIDUM: CPT | Performed by: OBSTETRICS & GYNECOLOGY

## 2017-03-24 PROCEDURE — 12000027 ZZH R&B OB

## 2017-03-24 PROCEDURE — 59514 CESAREAN DELIVERY ONLY: CPT | Mod: AS | Performed by: NURSE PRACTITIONER

## 2017-03-24 RX ORDER — OXYTOCIN/0.9 % SODIUM CHLORIDE 30/500 ML
340 PLASTIC BAG, INJECTION (ML) INTRAVENOUS CONTINUOUS PRN
Status: DISCONTINUED | OUTPATIENT
Start: 2017-03-24 | End: 2017-03-27 | Stop reason: HOSPADM

## 2017-03-24 RX ORDER — OXYCODONE HYDROCHLORIDE 5 MG/1
5-10 TABLET ORAL
Status: DISCONTINUED | OUTPATIENT
Start: 2017-03-24 | End: 2017-03-27 | Stop reason: HOSPADM

## 2017-03-24 RX ORDER — DEXAMETHASONE SODIUM PHOSPHATE 4 MG/ML
INJECTION, SOLUTION INTRA-ARTICULAR; INTRALESIONAL; INTRAMUSCULAR; INTRAVENOUS; SOFT TISSUE PRN
Status: DISCONTINUED | OUTPATIENT
Start: 2017-03-24 | End: 2017-03-24

## 2017-03-24 RX ORDER — FENTANYL CITRATE 50 UG/ML
25-50 INJECTION, SOLUTION INTRAMUSCULAR; INTRAVENOUS
Status: DISCONTINUED | OUTPATIENT
Start: 2017-03-24 | End: 2017-03-27 | Stop reason: HOSPADM

## 2017-03-24 RX ORDER — ONDANSETRON 2 MG/ML
4 INJECTION INTRAMUSCULAR; INTRAVENOUS EVERY 30 MIN PRN
Status: DISCONTINUED | OUTPATIENT
Start: 2017-03-24 | End: 2017-03-27 | Stop reason: HOSPADM

## 2017-03-24 RX ORDER — OXYTOCIN/0.9 % SODIUM CHLORIDE 30/500 ML
100 PLASTIC BAG, INJECTION (ML) INTRAVENOUS CONTINUOUS
Status: DISCONTINUED | OUTPATIENT
Start: 2017-03-24 | End: 2017-03-27 | Stop reason: HOSPADM

## 2017-03-24 RX ORDER — SODIUM CHLORIDE, SODIUM LACTATE, POTASSIUM CHLORIDE, CALCIUM CHLORIDE 600; 310; 30; 20 MG/100ML; MG/100ML; MG/100ML; MG/100ML
INJECTION, SOLUTION INTRAVENOUS CONTINUOUS
Status: DISCONTINUED | OUTPATIENT
Start: 2017-03-24 | End: 2017-03-24 | Stop reason: HOSPADM

## 2017-03-24 RX ORDER — ONDANSETRON 4 MG/1
4 TABLET, ORALLY DISINTEGRATING ORAL EVERY 30 MIN PRN
Status: DISCONTINUED | OUTPATIENT
Start: 2017-03-24 | End: 2017-03-27 | Stop reason: HOSPADM

## 2017-03-24 RX ORDER — LIDOCAINE 40 MG/G
CREAM TOPICAL
Status: DISCONTINUED | OUTPATIENT
Start: 2017-03-24 | End: 2017-03-27 | Stop reason: HOSPADM

## 2017-03-24 RX ORDER — LANOLIN 100 %
OINTMENT (GRAM) TOPICAL
Status: DISCONTINUED | OUTPATIENT
Start: 2017-03-24 | End: 2017-03-27 | Stop reason: HOSPADM

## 2017-03-24 RX ORDER — SIMETHICONE 80 MG
80 TABLET,CHEWABLE ORAL 4 TIMES DAILY PRN
Status: DISCONTINUED | OUTPATIENT
Start: 2017-03-24 | End: 2017-03-27 | Stop reason: HOSPADM

## 2017-03-24 RX ORDER — OXYTOCIN 10 [USP'U]/ML
10 INJECTION, SOLUTION INTRAMUSCULAR; INTRAVENOUS
Status: DISCONTINUED | OUTPATIENT
Start: 2017-03-24 | End: 2017-03-27 | Stop reason: HOSPADM

## 2017-03-24 RX ORDER — MORPHINE SULFATE 1 MG/ML
INJECTION, SOLUTION EPIDURAL; INTRATHECAL; INTRAVENOUS PRN
Status: DISCONTINUED | OUTPATIENT
Start: 2017-03-24 | End: 2017-03-24

## 2017-03-24 RX ORDER — KETOROLAC TROMETHAMINE 30 MG/ML
30 INJECTION, SOLUTION INTRAMUSCULAR; INTRAVENOUS
Status: DISCONTINUED | OUTPATIENT
Start: 2017-03-24 | End: 2017-03-27 | Stop reason: HOSPADM

## 2017-03-24 RX ORDER — NALOXONE HYDROCHLORIDE 0.4 MG/ML
.1-.4 INJECTION, SOLUTION INTRAMUSCULAR; INTRAVENOUS; SUBCUTANEOUS
Status: DISCONTINUED | OUTPATIENT
Start: 2017-03-24 | End: 2017-03-27 | Stop reason: HOSPADM

## 2017-03-24 RX ORDER — BISACODYL 10 MG
10 SUPPOSITORY, RECTAL RECTAL DAILY PRN
Status: DISCONTINUED | OUTPATIENT
Start: 2017-03-26 | End: 2017-03-27 | Stop reason: HOSPADM

## 2017-03-24 RX ORDER — DEXAMETHASONE SODIUM PHOSPHATE 4 MG/ML
4 INJECTION, SOLUTION INTRA-ARTICULAR; INTRALESIONAL; INTRAMUSCULAR; INTRAVENOUS; SOFT TISSUE
Status: DISCONTINUED | OUTPATIENT
Start: 2017-03-24 | End: 2017-03-27 | Stop reason: HOSPADM

## 2017-03-24 RX ORDER — NALBUPHINE HYDROCHLORIDE 10 MG/ML
2.5-5 INJECTION, SOLUTION INTRAMUSCULAR; INTRAVENOUS; SUBCUTANEOUS EVERY 6 HOURS PRN
Status: DISCONTINUED | OUTPATIENT
Start: 2017-03-24 | End: 2017-03-27 | Stop reason: HOSPADM

## 2017-03-24 RX ORDER — HYDROMORPHONE HYDROCHLORIDE 1 MG/ML
.3-.5 INJECTION, SOLUTION INTRAMUSCULAR; INTRAVENOUS; SUBCUTANEOUS EVERY 5 MIN PRN
Status: DISCONTINUED | OUTPATIENT
Start: 2017-03-24 | End: 2017-03-27 | Stop reason: HOSPADM

## 2017-03-24 RX ORDER — ONDANSETRON 2 MG/ML
4 INJECTION INTRAMUSCULAR; INTRAVENOUS EVERY 4 HOURS PRN
Status: DISCONTINUED | OUTPATIENT
Start: 2017-03-24 | End: 2017-03-27 | Stop reason: HOSPADM

## 2017-03-24 RX ORDER — PRENATAL VIT/IRON FUM/FOLIC AC 27MG-0.8MG
1 TABLET ORAL DAILY
Status: CANCELLED | OUTPATIENT
Start: 2017-03-24

## 2017-03-24 RX ORDER — KETOROLAC TROMETHAMINE 30 MG/ML
30 INJECTION, SOLUTION INTRAMUSCULAR; INTRAVENOUS EVERY 6 HOURS
Status: COMPLETED | OUTPATIENT
Start: 2017-03-24 | End: 2017-03-25

## 2017-03-24 RX ORDER — DIPHENHYDRAMINE HCL 25 MG
25 CAPSULE ORAL EVERY 6 HOURS PRN
Status: DISCONTINUED | OUTPATIENT
Start: 2017-03-24 | End: 2017-03-27 | Stop reason: HOSPADM

## 2017-03-24 RX ORDER — DEXTROSE, SODIUM CHLORIDE, SODIUM LACTATE, POTASSIUM CHLORIDE, AND CALCIUM CHLORIDE 5; .6; .31; .03; .02 G/100ML; G/100ML; G/100ML; G/100ML; G/100ML
INJECTION, SOLUTION INTRAVENOUS CONTINUOUS
Status: DISCONTINUED | OUTPATIENT
Start: 2017-03-24 | End: 2017-03-27 | Stop reason: HOSPADM

## 2017-03-24 RX ORDER — ONDANSETRON 2 MG/ML
4 INJECTION INTRAMUSCULAR; INTRAVENOUS EVERY 6 HOURS PRN
Status: DISCONTINUED | OUTPATIENT
Start: 2017-03-24 | End: 2017-03-27 | Stop reason: HOSPADM

## 2017-03-24 RX ORDER — HYDROCORTISONE 2.5 %
CREAM (GRAM) TOPICAL 3 TIMES DAILY PRN
Status: DISCONTINUED | OUTPATIENT
Start: 2017-03-24 | End: 2017-03-27 | Stop reason: HOSPADM

## 2017-03-24 RX ORDER — DIPHENHYDRAMINE HYDROCHLORIDE 50 MG/ML
25 INJECTION INTRAMUSCULAR; INTRAVENOUS EVERY 6 HOURS PRN
Status: DISCONTINUED | OUTPATIENT
Start: 2017-03-24 | End: 2017-03-27 | Stop reason: HOSPADM

## 2017-03-24 RX ORDER — FENTANYL CITRATE 50 UG/ML
INJECTION, SOLUTION INTRAMUSCULAR; INTRAVENOUS PRN
Status: DISCONTINUED | OUTPATIENT
Start: 2017-03-24 | End: 2017-03-24

## 2017-03-24 RX ORDER — OXYTOCIN/0.9 % SODIUM CHLORIDE 30/500 ML
PLASTIC BAG, INJECTION (ML) INTRAVENOUS PRN
Status: DISCONTINUED | OUTPATIENT
Start: 2017-03-24 | End: 2017-03-24

## 2017-03-24 RX ORDER — SODIUM CHLORIDE, SODIUM LACTATE, POTASSIUM CHLORIDE, CALCIUM CHLORIDE 600; 310; 30; 20 MG/100ML; MG/100ML; MG/100ML; MG/100ML
INJECTION, SOLUTION INTRAVENOUS CONTINUOUS PRN
Status: DISCONTINUED | OUTPATIENT
Start: 2017-03-24 | End: 2017-03-24

## 2017-03-24 RX ORDER — BUPIVACAINE HYDROCHLORIDE 7.5 MG/ML
INJECTION, SOLUTION INTRASPINAL PRN
Status: DISCONTINUED | OUTPATIENT
Start: 2017-03-24 | End: 2017-03-24

## 2017-03-24 RX ORDER — HYDROMORPHONE HYDROCHLORIDE 1 MG/ML
.3-.5 INJECTION, SOLUTION INTRAMUSCULAR; INTRAVENOUS; SUBCUTANEOUS EVERY 30 MIN PRN
Status: DISCONTINUED | OUTPATIENT
Start: 2017-03-24 | End: 2017-03-27 | Stop reason: HOSPADM

## 2017-03-24 RX ORDER — IBUPROFEN 400 MG/1
400-800 TABLET, FILM COATED ORAL EVERY 6 HOURS PRN
Status: DISCONTINUED | OUTPATIENT
Start: 2017-03-24 | End: 2017-03-27 | Stop reason: HOSPADM

## 2017-03-24 RX ORDER — ACETAMINOPHEN 325 MG/1
975 TABLET ORAL ONCE
Status: DISCONTINUED | OUTPATIENT
Start: 2017-03-24 | End: 2017-03-24 | Stop reason: HOSPADM

## 2017-03-24 RX ORDER — CEFAZOLIN SODIUM 2 G/100ML
2 INJECTION, SOLUTION INTRAVENOUS
Status: COMPLETED | OUTPATIENT
Start: 2017-03-24 | End: 2017-03-24

## 2017-03-24 RX ORDER — ONDANSETRON 2 MG/ML
INJECTION INTRAMUSCULAR; INTRAVENOUS PRN
Status: DISCONTINUED | OUTPATIENT
Start: 2017-03-24 | End: 2017-03-24

## 2017-03-24 RX ORDER — ACETAMINOPHEN 325 MG/1
650 TABLET ORAL EVERY 4 HOURS PRN
Status: DISCONTINUED | OUTPATIENT
Start: 2017-03-27 | End: 2017-03-27 | Stop reason: HOSPADM

## 2017-03-24 RX ORDER — MISOPROSTOL 200 UG/1
400 TABLET ORAL
Status: DISCONTINUED | OUTPATIENT
Start: 2017-03-24 | End: 2017-03-27 | Stop reason: HOSPADM

## 2017-03-24 RX ORDER — ACETAMINOPHEN 325 MG/1
975 TABLET ORAL EVERY 8 HOURS
Status: COMPLETED | OUTPATIENT
Start: 2017-03-24 | End: 2017-03-27

## 2017-03-24 RX ORDER — SCOLOPAMINE TRANSDERMAL SYSTEM 1 MG/1
1 PATCH, EXTENDED RELEASE TRANSDERMAL ONCE
Status: DISCONTINUED | OUTPATIENT
Start: 2017-03-24 | End: 2017-03-27 | Stop reason: HOSPADM

## 2017-03-24 RX ORDER — AMOXICILLIN 250 MG
1-2 CAPSULE ORAL 2 TIMES DAILY
Status: DISCONTINUED | OUTPATIENT
Start: 2017-03-24 | End: 2017-03-27 | Stop reason: HOSPADM

## 2017-03-24 RX ORDER — LIDOCAINE 40 MG/G
CREAM TOPICAL
Status: DISCONTINUED | OUTPATIENT
Start: 2017-03-24 | End: 2017-03-24 | Stop reason: HOSPADM

## 2017-03-24 RX ADMIN — SODIUM CITRATE AND CITRIC ACID MONOHYDRATE 30 ML: 500; 334 SOLUTION ORAL at 07:05

## 2017-03-24 RX ADMIN — SODIUM CHLORIDE, POTASSIUM CHLORIDE, SODIUM LACTATE AND CALCIUM CHLORIDE 1000 ML: 600; 310; 30; 20 INJECTION, SOLUTION INTRAVENOUS at 07:05

## 2017-03-24 RX ADMIN — KETOROLAC TROMETHAMINE 30 MG: 30 INJECTION, SOLUTION INTRAMUSCULAR at 23:35

## 2017-03-24 RX ADMIN — DEXAMETHASONE SODIUM PHOSPHATE 4 MG: 4 INJECTION, SOLUTION INTRAMUSCULAR; INTRAVENOUS at 07:58

## 2017-03-24 RX ADMIN — SENNOSIDES AND DOCUSATE SODIUM 1 TABLET: 8.6; 5 TABLET ORAL at 12:26

## 2017-03-24 RX ADMIN — OXYTOCIN-SODIUM CHLORIDE 0.9% IV SOLN 30 UNIT/500ML 340 ML: 30-0.9/5 SOLUTION at 08:09

## 2017-03-24 RX ADMIN — ACETAMINOPHEN 975 MG: 325 TABLET, FILM COATED ORAL at 12:26

## 2017-03-24 RX ADMIN — OXYCODONE HYDROCHLORIDE 10 MG: 5 TABLET ORAL at 20:42

## 2017-03-24 RX ADMIN — KETOROLAC TROMETHAMINE 30 MG: 30 INJECTION, SOLUTION INTRAMUSCULAR at 17:04

## 2017-03-24 RX ADMIN — FENTANYL CITRATE 25 MCG: 50 INJECTION, SOLUTION INTRAMUSCULAR; INTRAVENOUS at 07:50

## 2017-03-24 RX ADMIN — ACETAMINOPHEN 975 MG: 325 TABLET, FILM COATED ORAL at 20:42

## 2017-03-24 RX ADMIN — MORPHINE SULFATE 0.2 MG: 5 INJECTION, SOLUTION INTRAVENOUS at 07:50

## 2017-03-24 RX ADMIN — BUPIVACAINE HYDROCHLORIDE IN DEXTROSE 1.4 MG: 7.5 INJECTION, SOLUTION SUBARACHNOID at 07:50

## 2017-03-24 RX ADMIN — HYDROMORPHONE HYDROCHLORIDE 0.5 MG: 1 INJECTION, SOLUTION INTRAMUSCULAR; INTRAVENOUS; SUBCUTANEOUS at 12:27

## 2017-03-24 RX ADMIN — KETOROLAC TROMETHAMINE 30 MG: 30 INJECTION, SOLUTION INTRAMUSCULAR at 11:05

## 2017-03-24 RX ADMIN — SODIUM CHLORIDE, POTASSIUM CHLORIDE, SODIUM LACTATE AND CALCIUM CHLORIDE: 600; 310; 30; 20 INJECTION, SOLUTION INTRAVENOUS at 07:00

## 2017-03-24 RX ADMIN — ONDANSETRON 4 MG: 2 INJECTION INTRAMUSCULAR; INTRAVENOUS at 07:58

## 2017-03-24 RX ADMIN — SENNOSIDES AND DOCUSATE SODIUM 1 TABLET: 8.6; 5 TABLET ORAL at 20:42

## 2017-03-24 RX ADMIN — SODIUM CHLORIDE, POTASSIUM CHLORIDE, SODIUM LACTATE AND CALCIUM CHLORIDE: 600; 310; 30; 20 INJECTION, SOLUTION INTRAVENOUS at 07:55

## 2017-03-24 RX ADMIN — CEFAZOLIN SODIUM 2 G: 2 INJECTION, SOLUTION INTRAVENOUS at 07:05

## 2017-03-24 RX ADMIN — ONDANSETRON 4 MG: 2 INJECTION INTRAMUSCULAR; INTRAVENOUS at 11:05

## 2017-03-24 NOTE — H&P
[facility] Labor and Delivery History and Physical    Fatimah Roy MRN# [id number]   Age: 26 year old YOB: 1990     Date of Admission:  3/24/2017    Primary care provider: Kristen Manrique           Chief Complaint:   Fatimah Roy is a 26 year old female who is 39w0d pregnant and being admitted for  for breech          Pregnancy history:     OBSTETRIC HISTORY:    Obstetric History       T0      TAB0   SAB0   E0   M0   L0       # Outcome Date GA Lbr Alon/2nd Weight Sex Delivery Anes PTL Lv   1 Current                   EDC: Estimated Date of Delivery: 3/31/17    Prenatal Labs:   Lab Results   Component Value Date    ABO Pending 2017    RH Pending 2017    AS Pending 2017    HEPBANG Nonreactive 2016    CHPCRT  2016     Negative   Negative for C. trachomatis rRNA by transcription mediated amplification.   A negative result by transcription mediated amplification does not preclude the   presence of C. trachomatis infection because results are dependent on proper   and adequate collection, absence of inhibitors, and sufficient rRNA to be   detected.      GCPCRT  2016     Negative   Negative for N. gonorrhoeae rRNA by transcription mediated amplification.   A negative result by transcription mediated amplification does not preclude the   presence of N. gonorrhoeae infection because results are dependent on proper   and adequate collection, absence of inhibitors, and sufficient rRNA to be   detected.      TREPAB Negative 2016    HGB 13.1 2017       GBS Status:   Lab Results   Component Value Date    GBS  2017     Negative  No GBS DNA detected, presumed negative for GBS or number of bacteria may be   below the limit of detection of the assay.   Assay performed on incubated broth culture of specimen using [employer] real-time   PCR.         Active Problem List  Patient Active Problem List   Diagnosis      CARDIOVASCULAR SCREENING; LDL GOAL LESS THAN 160     Intermittent asthma     Seasonal allergic rhinitis     Chronic allergic conjunctivitis     Prenatal care, first pregnancy       Medication Prior to Admission  Prescriptions Prior to Admission   Medication Sig Dispense Refill Last Dose     loratadine (CLARITIN) 10 MG tablet Take 10 mg by mouth daily   Taking     Prenatal Vit-Fe Fumarate-FA (PRENATAL MULTIVITAMIN  PLUS IRON) 27-0.8 MG TABS Take 1 tablet by mouth daily   Taking     albuterol (PROAIR HFA, PROVENTIL HFA, VENTOLIN HFA) 108 (90 BASE) MCG/ACT inhaler Inhale 2 puffs into the lungs every 6 hours as needed for shortness of breath / dyspnea 1 Inhaler 2 Not Taking   .        Maternal Past Medical History:     Past Medical History:   Diagnosis Date     Chickenpox                        Family History:     Family History   Problem Relation Age of Onset     CANCER Mother      cervical      Hypertension Mother      GASTROINTESTINAL DISEASE Mother      diverticulitis     HEART DISEASE Maternal Grandmother      MI     HEART DISEASE Paternal Grandfather      MI     Hepatitis Father      Substance Abuse Father      recovered alcohol     Depression Father      C.A.D. No family hx of      DIABETES No family hx of      CEREBROVASCULAR DISEASE No family hx of      Breast Cancer No family hx of      Cancer - colorectal No family hx of      Family history reviewed and updated in [facility]            Social History:     Social History   Substance Use Topics     Smoking status: Never Smoker     Smokeless tobacco: Never Used     Alcohol use 0.0 oz/week     0 Standard drinks or equivalent per week      Comment: rare- quit with pregnancy            Review of Systems:   The Review of Systems is negative other than noted in the HPI          Physical Exam:   Vitals were reviewed  Temp: 98.5  F (36.9  C) Temp src: Oral BP: 119/78 Pulse: 90            Constitutional:   awake, alert, cooperative, no apparent distress, and appears stated  age     Lungs:   No increased work of breathing, good air exchange, clear to auscultation bilaterally, no crackles or wheezing     Cardiovascular:   normal S1 and S2      Cervix:   Membranes: intact  Presentation:Breech  Fetal Heart Rate Tracing: reactive and reassuring, Tier 1 (normal)  Tocometer: external monitor                       Assessment:   Fatimah Roy is a 39w0d pregnant female admitted with .          Plan:   Admit - see IP orders  Prepare for  section    Renetta Saeed MD

## 2017-03-24 NOTE — ANESTHESIA POSTPROCEDURE EVALUATION
Patient: Fatimah Roy    Procedure(s):   section. - Wound Class: I-Clean    Diagnosis:Breech  Diagnosis Additional Information: No value filed.    Anesthesia Type:  Spinal    Note:  Anesthesia Post Evaluation    Patient location during evaluation: Bedside and Floor  Patient participation: Able to participate in evaluation but full recovery from regional anesthesia has not yet occurred and is not anticipated to occur within 48 hours  Level of consciousness: awake and alert  Pain management: adequate  Airway patency: patent  Cardiovascular status: acceptable and hemodynamically stable  Respiratory status: acceptable and room air  Hydration status: acceptable  PONV: none     Anesthetic complications: None    Comments: Pt still numb from SAB, VSS        Last vitals:  Vitals:    17 0600   BP: 119/78   Pulse: 90   Temp: 36.9  C (98.5  F)         Electronically Signed By: HECTOR Marshall CRNA  2017  8:53 AM

## 2017-03-24 NOTE — OR NURSING
S:Delivery  B:No Labor,39w0d    Lab Results   Component Value Date    GBS  2017     Negative  No GBS DNA detected, presumed negative for GBS or number of bacteria may be   below the limit of detection of the assay.   Assay performed on incubated broth culture of specimen using [employer] real-time   PCR.      with antibiotic treatment not indicated 4 hours prior to delivery.  A: Patient delivered Primary C/S for  malposition at 0808 with Dr. FADY Saeed in attendance and baby placed on mother's abdomen for delayed cord clamping. Baby dried and stimulated. Baby placed skin to skin on mother's chest within 5 minutes following delivery. Placed skin to skin @ 0900. Apgars 9/9.  IV infusion of Oxytocin rapidly infused. Placenta removal manual. See Flowsheet for VS and PP checks. Labor care plan goals met, transition now to postpartum care.  R: Expect routine postpartum care. Anticipate first feeding within the hour or whenever infant displays feeding cues. Continue skin to skin. Prior discussion with mother indicates that feeding plan is Breast feeding on demand. Educated mother on importance of exclusive breastfeeding, expected feeding readiness cues and encouraged her to observe for these cues while rooming in. Informed her that breastfeeding assistance would be provided.Data: Vital signs within normal limits. Postpartum checks within normal limits - see flow record. Patient eating and drinking normally. Patient has an indwelling catheter. . Patient has not yet ambulated..   No apparent signs of infection. Incision healing well. Patient Is not performing self cares and is able to care for infant. Positive attachment behaviors are observed with infant. Support persons are present.  Action: Patient medicated during the shift for pain and cramping. See MAR.Patient education done about breastfeeding,  cares and postpartum cares. See flow record.  Response:   Patient reassessed within 1 hour after each  medication and pain. Patient stated that pain had improved. Patient stated that she was comfortable. .   Plan: Anticipate discharge on 3/28/17.   Patient plans to feed her baby by Breast feeding on demand. Reviewed benefits of breastfeeding including; protecting baby from ear infection, asthma, eczema, lung infections, obesity, gi infections, urinary infections and childhood diabetes. Also included mom's benefits of protecting against obesity, breast and ovarian cancer and osteoporosis. Encouraged cue based feeding to prevent encouragement, ensure a good milk supply and a contented baby.    Skin to skin contact was introduced including benefits of calming baby, regulating vital signs and infant glucose levels, bonding and facilitates breastfeeding. Encouraged to be skin to skin as often as possible with either parent as it helps to relax and comfort infant.    Reviewed rooming in practice so that she can learn baby feeding cues, how to handle and comfort her baby, enable demand feedings and allow baby to recognize her as mother.Mother and baby transferred to postpartum room at 0900 via bed and infant skin to skin with mom for post  section phase 2 recovery period. Patient oriented to room. Mother and baby bonding well and in stable condition upon transfer.

## 2017-03-24 NOTE — IP AVS SNAPSHOT
MRN:[id number]                      After Visit Summary   3/24/2017    Fatimah Roy    MRN: [id number]           Thank you!     Thank you for choosing [facility] for your care. Our goal is always to provide you with excellent care. Hearing back from our patients is one way we can continue to improve our services. Please take a few minutes to complete the written survey that you may receive in the mail after you visit with us. Thank you!        Patient Information     Date Of Birth          1990        About your hospital stay     You were admitted on:  March 24, 2017 You last received care in the:  [facility]    You were discharged on:  March 27, 2017       Who to Call     For medical emergencies, please call 911.  For non-urgent questions about your medical care, please call your primary care provider or clinic, [phone]  For questions related to your surgery, please call your surgery clinic        Attending Provider     Provider eRnetta Garcia MD OB/Gyn       Primary Care Provider Office Phone # Fax #    [facility] [phone]       [facility] [address] [place] [address]        After Care Instructions     Activity       Review discharge instructions            Diet       Resume previous diet            Discharge Instructions - Gestational diabetic patients       Gestational diabetic patients to follow up for fasting blood sugar and 2 hour 75gm glucose load at 6 weeks postpartum.            Discharge Instructions - Postpartum visit       Schedule postpartum visit with your provider and return to clinic in 6 weeks.    Your activity upon discharge: Activity as tolerated  No lifting or strenuous exercise for 6 weeks  No driving for 4 weeks or no driving while on narcotic pain medications  Nothing in the vagina including sex, douching or tampons for 6 weeks    Call  if you have any of the  following:  Temperature > 100.4  Foul smelling vaginal discharge  Bleeding > 1 pad per hour x 2 hrs,   Pain not controlled by oral pain meds  Severe constipation or severe nausea or vomiting.                  Your next 10 appointments already scheduled     Mar 30, 2017  1:30 PM CDT   ESTABLISHED PRENATAL with Alfredo Ruff MD   [facility]    [address] [place]  [facility] [address]   [phone]              Further instructions from your care team       Follow up with OBGYN in 6 weeks    Postop  Birth Instructions    Activity       Do not lift more than 10 pounds for 6 weeks after surgery.  Ask family and friends for help when you need it.    No driving until you have stopped taking your pain medications (usually two weeks after surgery).    No heavy exercise or activity for 6 weeks.  Don't do anything that will put a strain on your surgery site.    Don't strain when using the toilet.  Your care team may prescribe a stool softener if you have problems with your bowel movements.     To care for your incision:       Keep the incision clean and dry.    Do not soak your incision in water. No swimming or hot tubs until it has fully healed. You may soak in the bathtub if the water level is below your incision.    Do not use peroxide, gel, cream, lotion, or ointment on your incision.    Adjust your clothes to avoid pressure on your surgery site (check the elastic in your underwear for example).     You may see a small amount of clear or pink drainage and this is normal.  Check with your health care provider:       If the drainage increases or has an odor.    If the incision reddens, you have swelling, or develop a rash.    If you have increased pain and the medicine we prescribed doesn't help.    If you have a fever above 100.4 F (38 C) with or without chills when placing thermometer under your tongue.   The area around your incision (surgery wound), will feel  numb.  This is normal. The numbness should go away in less than a year.     Keep your hands clean:  Always wash your hands before touching your incision (surgery wound). This helps reduce your risk of infection. If your hands aren't dirty, you may use an alcohol hand-rub to clean your hands. Keep your nails clean and short.    Call your healthcare provider if you have any of these symptoms:       You soak a sanitary pad with blood within 1 hour, or you see blood clots larger than a golf ball.    Bleeding that lasts more than 6 weeks.    Vaginal discharge that smells bad.    Severe pain, cramping or tenderness in your lower belly area.    A need to urinate more frequently (use the toilet more often), more urgently (use the toilet very quickly), or it burns when you urinate.    Nausea and vomiting.    Redness, swelling or pain around a vein in your leg.    Problems breastfeeding or a red or painful area on your breast.    Chest pain and cough or are gasping for air.    Problems with coping with sadness, anxiety or depression. If you have concerns about hurting yourself or the baby, call your provider immediately.      You have questions or concerns after you return home.     If you you feel sad, have difficulty sleeping, feel overwhelmed, anxious or have troubling thoughts you may call your clinic, or the HelpLine for Pregnancy & Postpartum Support [place]. ([facility] HelpLine [phone]) or online resource list  @ www.ppsupport[place].org    If you have concerns/questions after returning home, contact the nurse triage line [phone] or your primary care clinic.             Pending Results     No orders found from 3/22/2017 to 3/25/2017.            Statement of Approval     Ordered          03/27/17 0750  I have reviewed and agree with all the recommendations and orders detailed in this document.  EFFECTIVE NOW     Approved and electronically signed by:  Micky Cifuentes MD             Admission Information     Date &  "Time Provider Department Dept. Phone    3/24/2017 Renetta Saeed MD [facility] [phone]      Your Vitals Were     Blood Pressure Pulse Temperature Respirations Height Weight    113/74 77 98.3  F (36.8  C) (Oral) 18 1.638 m (5' 4.49\") 75.1 kg (165 lb 9.6 oz)    Last Period Pulse Oximetry BMI (Body Mass Index)             06/25/2016 96% 28 kg/m2         [employer] Information     [employer] gives you secure access to your electronic health record. If you see a primary care provider, you can also send messages to your care team and make appointments. If you have questions, please call your primary care clinic.  If you do not have a primary care provider, please call [phone] and they will assist you.        Care EveryWhere ID     This is your Care EveryWhere ID. This could be used by other organizations to access your [facility] medical records  [id number]           Review of your medicines      START taking        Dose / Directions    ibuprofen 600 MG tablet   Commonly known as:  ADVIL/MOTRIN        Dose:  600 mg   Take 1 tablet (600 mg) by mouth every 6 hours as needed for moderate pain   Quantity:  90 tablet   Refills:  1       oxyCODONE-acetaminophen 5-325 MG per tablet   Commonly known as:  PERCOCET        Dose:  1-2 tablet   Take 1-2 tablets by mouth every 6 hours as needed for pain maximum 6 tablet(s) per day   Quantity:  40 tablet   Refills:  0       senna-docusate 8.6-50 MG per tablet   Commonly known as:  SENOKOT-S;PERICOLACE        Dose:  1 tablet   Take 1 tablet by mouth 2 times daily   Quantity:  60 tablet   Refills:  1         CONTINUE these medicines which have NOT CHANGED        Dose / Directions    CLARITIN 10 MG tablet   Generic drug:  loratadine        Dose:  10 mg   Take 10 mg by mouth daily   Refills:  0       prenatal multivitamin  plus iron 27-0.8 MG Tabs per tablet        Dose:  1 tablet   Take 1 tablet by mouth daily   Refills:  0            Where to get your medicines    "   These medications were sent to [facility] [place], [place] - [address]  [address] [address]     Phone:  [phone]     ibuprofen 600 MG tablet    senna-docusate 8.6-50 MG per tablet         Some of these will need a paper prescription and others can be bought over the counter. Ask your nurse if you have questions.     Bring a paper prescription for each of these medications     oxyCODONE-acetaminophen 5-325 MG per tablet                Protect others around you: Learn how to safely use, store and throw away your medicines at www.disposemymeds.org.             Medication List: This is a list of all your medications and when to take them. Check marks below indicate your daily home schedule. Keep this list as a reference.      Medications           Morning Afternoon Evening Bedtime As Needed    CLARITIN 10 MG tablet   Take 10 mg by mouth daily   Generic drug:  loratadine                                   ibuprofen 600 MG tablet   Commonly known as:  ADVIL/MOTRIN   Take 1 tablet (600 mg) by mouth every 6 hours as needed for moderate pain   Last time this was given:  800 mg on 3/27/2017  8:42 AM   Next Dose Due:  2:46pm                                   oxyCODONE-acetaminophen 5-325 MG per tablet   Commonly known as:  PERCOCET   Take 1-2 tablets by mouth every 6 hours as needed for pain maximum 6 tablet(s) per day   Last time this was given:  8:43am   Next Dose Due:  2:46pm                                   prenatal multivitamin  plus iron 27-0.8 MG Tabs per tablet   Take 1 tablet by mouth daily                                   senna-docusate 8.6-50 MG per tablet   Commonly known as:  SENOKOT-S;PERICOLACE   Take 1 tablet by mouth 2 times daily   Last time this was given:  1 tablet on 3/27/2017  8:42 AM

## 2017-03-24 NOTE — ANESTHESIA PREPROCEDURE EVALUATION
Anesthesia Evaluation     . Pt has had prior anesthetic. Type: MAC           ROS/MED HX    ENT/Pulmonary:     (+)allergic rhinitis, Intermittent asthma , . .    Neurologic:  - neg neurologic ROS     Cardiovascular:  - neg cardiovascular ROS       METS/Exercise Tolerance:     Hematologic:  - neg hematologic  ROS       Musculoskeletal:  - neg musculoskeletal ROS       GI/Hepatic:  - neg GI/hepatic ROS       Renal/Genitourinary:  - ROS Renal section negative       Endo:  - neg endo ROS       Psychiatric:  - neg psychiatric ROS       Infectious Disease:  - neg infectious disease ROS       Malignancy:         Other:    (+) Possibly pregnant C-spine cleared: N/A, no H/O Chronic Pain,no other significant disability                    Physical Exam  Normal systems: cardiovascular, pulmonary and dental    Airway   Mallampati: I  TM distance: >3 FB  Neck ROM: full    Dental     Cardiovascular       Pulmonary                     Anesthesia Plan      History & Physical Review  History and physical reviewed and following examination; no interval change.    ASA Status:  2 .    NPO Status:  > 8 hours    Plan for Spinal Maintenance will be Other.    PONV prophylaxis:  Ondansetron (or other 5HT-3) and Dexamethasone or Solumedrol       Postoperative Care  Postoperative pain management:  IV analgesics, Oral pain medications and Multi-modal analgesia.      Consents  Anesthetic plan, risks, benefits and alternatives discussed with:  Patient..                          .

## 2017-03-24 NOTE — ANESTHESIA PROCEDURE NOTES
Peripheral nerve/Neuraxial procedure note : intrathecal  Pre-Procedure  Performed by  BRY PIERCE   Location: OR      Pre-Anesthestic Checklist: patient identified, IV checked, risks and benefits discussed, informed consent, monitors and equipment checked and pre-op evaluation    Timeout  Correct Patient: Yes   Correct Procedure: Yes   Correct Site: Yes   Correct Laterality: N/A   Correct Position: Yes   Site Marked: N/A   .   Procedure Documentation  ASA 2  Diagnosis:Breech presentation.    Procedure:    Intrathecal.  Insertion Site:L3-4  (midline approach)      Patient Prep;povidone-iodine 7.5% surgical scrub.  .  Needle: (). # of attempts: 2. # of redirects: 3. Spinal Needle: 27 G. 3.5 in.  Introducer used. . .     Assessment/Narrative  .  .  clear CSF fluid removed while sitting   . Time Injected: 07:50  Sensory Level: T6

## 2017-03-24 NOTE — IP AVS SNAPSHOT
[facility]    [address] [address]    Phone:  [phone]    Fax:  [phone]                                       After Visit Summary   3/24/2017    Fatimah Roy    MRN: [id number]           After Visit Summary Signature Page     I have received my discharge instructions, and my questions have been answered. I have discussed any challenges I see with this plan with the nurse or doctor.    ..........................................................................................................................................  Patient/Patient Representative Signature      ..........................................................................................................................................  Patient Representative Print Name and Relationship to Patient    ..................................................               ................................................  Date                                            Time    ..........................................................................................................................................  Reviewed by Signature/Title    ...................................................              ..............................................  Date                                                            Time

## 2017-03-24 NOTE — ANESTHESIA CARE TRANSFER NOTE
Patient: Fatimah Roy    Procedure(s):   section. - Wound Class: I-Clean    Diagnosis: Breech  Diagnosis Additional Information: No value filed.    Anesthesia Type:   Spinal     Note:  Airway :Room Air  Patient transferred to:Labor and Delivery        Vitals: (Last set prior to Anesthesia Care Transfer)    CRNA VITALS  3/24/2017 0812 - 3/24/2017 0852      3/24/2017             Pulse: 82    SpO2: 95 %                Electronically Signed By: HECTOR Marshall CRNA  2017  8:52 AM

## 2017-03-24 NOTE — PLAN OF CARE
Problem: Goal Outcome Summary  Goal: Goal Outcome Summary  Outcome: Improving  Fatimah Roy  [id number]  39w0d          Fatimah Roy presents for labor evaluation. States amauri since rare. Rates pain at 0/10. The bleeding began denies. Denies rupture.      Dr Saeed to do primary C/S for breech was notified.      FHT: 120  NST:REACTIVE     Plan:  Primary C/S

## 2017-03-24 NOTE — L&D DELIVERY NOTE
"25 yo G1 @ 39w0d with breech presentation  Primary low transverse  section     No complications  Viable male, complete breech, 7#5, Apgars 9/9, clear fluid, placenta 3vc, normal uterus/tubes/ovaries  Mother and infant stable.    Bren Saeed M.D.   Delivery Summary    Fatimah Roy MRN# [id number]   Age: 26 year old YOB: 1990       Labor Length    2nd Stage (hrs):  0 (min):  0      Delivery/Placenta Date and Time    Delivery Date:  3/24/17 Delivery Time:   8:08 AM      Apgars    Living status:  Yes    1 Minute 5 Minute 10 Minute 15 Minute 20 Minute   Skin color: 1  1       Heart rate: 2  2       Reflex irritability: 2  2       Muscle tone: 2  2       Respiratory effort: 2  2       Total: 9  9          Apgars assigned by:  ANGEL MONDRAGON      Cord    Vessels:  3 Vessels Complications:  None   Cord Blood Disposition:  Lab Gases Sent?:  No         [place] Resuscitation    Methods:  None          Measurements    Weight:  117 oz Length:  20\"   Head circumference:  0.343 m       Labor Events and Shoulder Dystocia    Fetal Tracing Prior to Delivery:  Category 1   Shoulder dystocia present?:  Neg            Delivery (Maternal) (Provider to Complete) ([id number])    Episiotomy:  None         Mother's Information  Mother: Fatimah Roy #[id number]    Start of Mother's Information     IO Blood Loss  17 0803 - 17 1341    Mom's I/O Activity            End of Mother's Information  Mother: Fatimah Roy #[id number]            Delivery - Provider to Complete ([id number])    Delivering clinician:  BREN SAEED   Attempted Delivery Types (Choose all that apply):  Other   Delivery Type (Choose the 1 that will go to the Birth History):  , Low Transverse                      Specifics:  Primary nulliparius   Indications for Primary:  Medical Indication, Other   Other Indications:  breech         Placenta    Immediate Cord Clamping:  Done   Removal:  Expressed "   Disposition:  Hospital disposal      Anesthesia    Method:  Spinal         Presentation and Position    Presentation:  Eleazar Saeed MD

## 2017-03-24 NOTE — BRIEF OP NOTE
Brief Op Note    Preop Dx: 1. 39w0d IUP, 2. Breech presentation  Postop DX: same  Procedure: low transverse  section   Surgeon: Renetta Saeed M.D.   Assist: DANILO Valdes  Anesthesia: spinal  FRA Score: 2  EBL: 500  IVF: see anesthesia records  UOP: see anesthesia records  Complications: none  Findings: viable male, complete breech, 7#5, Apgars 9/9, clear fluid, placenta 3vc, normal uterus/tubes/ovaries  Disposition: stable  Dictation :[id number]

## 2017-03-25 LAB — HGB BLD-MCNC: 11.5 G/DL (ref 11.7–15.7)

## 2017-03-25 PROCEDURE — 85018 HEMOGLOBIN: CPT | Performed by: OBSTETRICS & GYNECOLOGY

## 2017-03-25 PROCEDURE — 12000027 ZZH R&B OB

## 2017-03-25 PROCEDURE — 25000132 ZZH RX MED GY IP 250 OP 250 PS 637: Performed by: OBSTETRICS & GYNECOLOGY

## 2017-03-25 PROCEDURE — 25000128 H RX IP 250 OP 636: Performed by: OBSTETRICS & GYNECOLOGY

## 2017-03-25 PROCEDURE — 36415 COLL VENOUS BLD VENIPUNCTURE: CPT | Performed by: OBSTETRICS & GYNECOLOGY

## 2017-03-25 RX ADMIN — ACETAMINOPHEN 975 MG: 325 TABLET, FILM COATED ORAL at 05:17

## 2017-03-25 RX ADMIN — SIMETHICONE CHEW TAB 80 MG 80 MG: 80 TABLET ORAL at 16:27

## 2017-03-25 RX ADMIN — Medication: at 22:30

## 2017-03-25 RX ADMIN — SENNOSIDES AND DOCUSATE SODIUM 1 TABLET: 8.6; 5 TABLET ORAL at 08:30

## 2017-03-25 RX ADMIN — ACETAMINOPHEN 975 MG: 325 TABLET, FILM COATED ORAL at 21:28

## 2017-03-25 RX ADMIN — ACETAMINOPHEN 975 MG: 325 TABLET, FILM COATED ORAL at 13:13

## 2017-03-25 RX ADMIN — OXYCODONE HYDROCHLORIDE 5 MG: 5 TABLET ORAL at 08:30

## 2017-03-25 RX ADMIN — OXYCODONE HYDROCHLORIDE 5 MG: 5 TABLET ORAL at 23:47

## 2017-03-25 RX ADMIN — SENNOSIDES AND DOCUSATE SODIUM 2 TABLET: 8.6; 5 TABLET ORAL at 20:03

## 2017-03-25 RX ADMIN — SIMETHICONE CHEW TAB 80 MG 80 MG: 80 TABLET ORAL at 13:48

## 2017-03-25 RX ADMIN — KETOROLAC TROMETHAMINE 30 MG: 30 INJECTION, SOLUTION INTRAMUSCULAR at 05:16

## 2017-03-25 RX ADMIN — OXYCODONE HYDROCHLORIDE 10 MG: 5 TABLET ORAL at 13:13

## 2017-03-25 RX ADMIN — OXYCODONE HYDROCHLORIDE 10 MG: 5 TABLET ORAL at 16:27

## 2017-03-25 RX ADMIN — IBUPROFEN 800 MG: 400 TABLET ORAL at 11:34

## 2017-03-25 RX ADMIN — OXYCODONE HYDROCHLORIDE 10 MG: 5 TABLET ORAL at 20:03

## 2017-03-25 RX ADMIN — IBUPROFEN 800 MG: 400 TABLET ORAL at 18:48

## 2017-03-25 NOTE — PLAN OF CARE
Problem: Individualization  Goal: Patient Preferences  Outcome: Improving  Pt in bed after amb in room. Requests PCD remain off d/t they irritate her et she cannot sleep. Reinforce importance of foot pumps/moving legs frequently in bed et position changes. Verbalizes understanding. VSS. Minimal vaginal flow. Tolerates regular diet. + BS. Cochran drng clear urine. SO present et supportive. Call light w/in reach.

## 2017-03-25 NOTE — PROGRESS NOTES
[facility] Obstetrics Post-Op / Progress Note         Assessment and Plan:    Assessment:   Post-operative day #1  Low transverse primary  section  L&D complications: Breech presentation  Intrauterine pregnancy at 39w0d   weeks gestation      Doing well.  Clean wound without signs of infection.  Normal healing wound.  No immediate surgical complications identified.  No excessive bleeding  Pain well-controlled.      Plan:   Ambulation encouraged  Breast feeding strategies discussed  Pain control measures as needed  Reportable signs and symptoms dicussed with the patient           Interval History:   Doing well.  Pain is well-controlled.  No fevers.  No history of wound drainage, warmth or significant erythema.  Good appetite.  Denies chest pain, shortness of breath, nausea or vomiting.  Ambulatory.  Breastfeeding well.  Passing flatus.          Significant Problems:    (Refer to attending physician notes regarding additional medical problems and issues)          Review of Systems:    C: NEGATIVE for fever, chills, change in weight  E/M: NEGATIVE for ear, mouth and throat problems  R: NEGATIVE for significant cough or SOB  CV: NEGATIVE for chest pain, palpitations or peripheral edema          Medications:     All medications related to the patient's surgery have been reviewed  Current Facility-Administered Medications   Medication     fentaNYL Citrate (PF) (SUBLIMAZE) injection 25-50 mcg     ondansetron (ZOFRAN-ODT) ODT tab 4 mg    Or     ondansetron (ZOFRAN) injection 4 mg     prochlorperazine (COMPAZINE) injection 5-10 mg     HYDROmorphone (PF) (DILAUDID) injection 0.3-0.5 mg     ketorolac (TORADOL) injection 30 mg     dexamethasone (DECADRON) injection 4 mg     dexamethasone (DECADRON) injection 4 mg     naloxone (NARCAN) injection 0.1-0.4 mg     nalbuphine (NUBAIN) injection 2.5-5 mg     scopolamine (TRANSDERM) 1 MG/3DAYS 72 hr patch 1 patch     scopolamine (TRANSDERM-SCOP) patch REMOVAL      ondansetron (ZOFRAN) injection 4 mg     lidocaine 1 % 1 mL     lidocaine (LMX4) kit     sodium chloride (PF) 0.9% PF flush 3 mL     sodium chloride (PF) 0.9% PF flush 3 mL     oxytocin (PITOCIN) 30 units in 500 mL 0.9% NaCl infusion     dextrose 5% in lactated ringers infusion     naloxone (NARCAN) injection 0.1-0.4 mg     ibuprofen (ADVIL/MOTRIN) tablet 400-800 mg     HYDROmorphone (PF) (DILAUDID) injection 0.3-0.5 mg     senna-docusate (SENOKOT-S;PERICOLACE) 8.6-50 MG per tablet 1-2 tablet     [START ON 3/26/2017] bisacodyl (DULCOLAX) Suppository 10 mg     [START ON 3/26/2017] sodium phosphate (FLEET ENEMA) 1 enema     ondansetron (ZOFRAN) injection 4 mg     hydrocortisone 2.5 % cream     diphenhydrAMINE (BENADRYL) capsule 25 mg    Or     diphenhydrAMINE (BENADRYL) injection 25 mg     lanolin ointment     simethicone (MYLICON) chewable tablet 80 mg     lactated ringers BOLUS 1,000 mL     oxytocin (PITOCIN) 30 units in 500 mL 0.9% NaCl infusion     oxytocin (PITOCIN) injection 10 Units     misoprostol (CYTOTEC) tablet 400 mcg     NO Rho (D) immune globulin (RhoGam) needed - mother Rh POSITIVE     acetaminophen (TYLENOL) tablet 975 mg     [START ON 3/27/2017] acetaminophen (TYLENOL) tablet 650 mg     oxyCODONE (ROXICODONE) IR tablet 5-10 mg             Physical Exam:   All vitals stable  Temp: 98.1  F (36.7  C) Temp src: Oral BP: 110/61 Pulse: 81   Resp: 16 SpO2: 98 %      Wound clean and dry with minimal or no drainage.  Surrounding skin with minimal erythema.  Constitutional:   awake, alert, cooperative, no apparent distress, and appears stated age     Lungs:   No increased work of breathing, good air exchange, clear to auscultation bilaterally, no crackles or wheezing     Cardiovascular:   Normal apical impulse, regular rate and rhythm, normal S1 and S2, no S3 or S4, and no murmur noted     Abdomen:   normal bowel sounds, soft, non-distended, non-tender, no masses palpated, no hepatosplenomegally, fundus  U-2cm/firm/appropriately tender     Genitounirinary:   External Genitalia:  General appearance; normal     Ext-no e/c/c          Data:     All laboratory data related to this surgery reviewed  Hemoglobin   Date Value Ref Range Status   03/25/2017 11.5 (L) 11.7 - 15.7 g/dL Final   03/24/2017 13.1 11.7 - 15.7 g/dL Final   01/02/2017 12.3 11.7 - 15.7 g/dL Final   08/23/2016 12.8 11.7 - 15.7 g/dL Final   07/18/2016 13.8 11.7 - 15.7 g/dL Final     No imaging studies have been ordered    Deya Bajwa MD

## 2017-03-25 NOTE — PLAN OF CARE
Problem: Postpartum ( Delivery) (Adult)  Goal: Signs and Symptoms of Listed Potential Problems Will be Absent or Manageable (Postpartum)  Signs and symptoms of listed potential problems will be absent or manageable by discharge/transition of care (reference Postpartum ( Delivery) (Adult) CPG).  0800- 26 year old s/p- primary c/s for footling breech presentation.  Delivery3/25 @0808.  Labs,notes,assessement viewed. Patient attentive to infant. Breastfeeding with latch score 8/10.  nontender nipples. Adequate relief with oral meds. Passing flattus per patient- bowel sounds hypoactive.   oob for with assistance x1.

## 2017-03-25 NOTE — PLAN OF CARE
Problem: Goal Outcome Summary  Goal: Goal Outcome Summary  Outcome: Improving  Pt assessment wnl, vss.  Pt up independently in room.  Pt encouraged to ambulate halls this evening.  Pt medicated for pain per MAR.  Pt attentive to .  Plan:  Will continue to monitor and assess.

## 2017-03-26 PROCEDURE — 12000027 ZZH R&B OB

## 2017-03-26 PROCEDURE — 25000132 ZZH RX MED GY IP 250 OP 250 PS 637: Performed by: OBSTETRICS & GYNECOLOGY

## 2017-03-26 RX ADMIN — SENNOSIDES AND DOCUSATE SODIUM 1 TABLET: 8.6; 5 TABLET ORAL at 08:10

## 2017-03-26 RX ADMIN — OXYCODONE HYDROCHLORIDE 5 MG: 5 TABLET ORAL at 10:01

## 2017-03-26 RX ADMIN — OXYCODONE HYDROCHLORIDE 10 MG: 5 TABLET ORAL at 23:01

## 2017-03-26 RX ADMIN — SIMETHICONE CHEW TAB 80 MG 80 MG: 80 TABLET ORAL at 00:17

## 2017-03-26 RX ADMIN — OXYCODONE HYDROCHLORIDE 10 MG: 5 TABLET ORAL at 13:55

## 2017-03-26 RX ADMIN — SIMETHICONE CHEW TAB 80 MG 80 MG: 80 TABLET ORAL at 10:01

## 2017-03-26 RX ADMIN — OXYCODONE HYDROCHLORIDE 5 MG: 5 TABLET ORAL at 05:24

## 2017-03-26 RX ADMIN — IBUPROFEN 800 MG: 400 TABLET ORAL at 13:55

## 2017-03-26 RX ADMIN — IBUPROFEN 800 MG: 400 TABLET ORAL at 01:29

## 2017-03-26 RX ADMIN — IBUPROFEN 800 MG: 400 TABLET ORAL at 08:10

## 2017-03-26 RX ADMIN — SENNOSIDES AND DOCUSATE SODIUM 2 TABLET: 8.6; 5 TABLET ORAL at 20:20

## 2017-03-26 RX ADMIN — ACETAMINOPHEN 975 MG: 325 TABLET, FILM COATED ORAL at 13:00

## 2017-03-26 RX ADMIN — IBUPROFEN 800 MG: 400 TABLET ORAL at 20:20

## 2017-03-26 RX ADMIN — OXYCODONE HYDROCHLORIDE 10 MG: 5 TABLET ORAL at 17:25

## 2017-03-26 RX ADMIN — ACETAMINOPHEN 975 MG: 325 TABLET, FILM COATED ORAL at 05:24

## 2017-03-26 RX ADMIN — SIMETHICONE CHEW TAB 80 MG 80 MG: 80 TABLET ORAL at 17:30

## 2017-03-26 RX ADMIN — ACETAMINOPHEN 975 MG: 325 TABLET, FILM COATED ORAL at 21:10

## 2017-03-26 NOTE — LACTATION NOTE
This note was copied from a baby's chart.  Infant crying.  Offered assistance with breastfeeding.  Mom states she just expressed a dropper full and fed that to baby.  Asked if infant latching she stated sometimes.  Offered to assist but pt declined and stated she was going to sleep and try again in a few hours.  This writer let her know I was available for help if needed

## 2017-03-26 NOTE — PLAN OF CARE
Problem: Postpartum ( Delivery) (Adult)  Goal: Signs and Symptoms of Listed Potential Problems Will be Absent or Manageable (Postpartum)  Signs and symptoms of listed potential problems will be absent or manageable by discharge/transition of care (reference Postpartum ( Delivery) (Adult) CPG).   1630- 26 year old aneta 1 para 1001 -  Primary C/S 3/24@ 0808 for breech presentation- 39+0 weeks gestation.   Breastfeeding-see  feeding plan in infant's notes. Return demonstration - breast pump, breast massage, nipple shells, nipples shields.  Competent and attentive to infant's cares and feedings. Adequate pain relief per patient. Passing flattus and taking regular diet well.

## 2017-03-26 NOTE — PROGRESS NOTES
Pt pain is a 5/10. Oxy and mylicon will be given.  Pt is ambulating and tolerating the activity well.  Pt was teary eyed and feeling a little over whelmed with the breastfeeding.  We talked about baby blues. Pt stated she liked the feeding plan because it worked well and she was happy to see the baby satisfied.  Pt's  is very supportive.

## 2017-03-26 NOTE — PLAN OF CARE
Problem: Postpartum ( Delivery) (Adult)  Goal: Signs and Symptoms of Listed Potential Problems Will be Absent or Manageable (Postpartum)  Signs and symptoms of listed potential problems will be absent or manageable by discharge/transition of care (reference Postpartum ( Delivery) (Adult) CPG).   Outcome: Declining  VSS. Abd incision dry et intact. States reasonably comfortable w/(po) rx's.  Amb in room et voids in adequate amounts. Tolerates regular diet. Voices frustration regarding inability to pump colostrum. Pt not assertive regarding feeding baby. Support offered. Discuss intervention options, comfort,  feeding needs, etc. Feeding plan established.

## 2017-03-26 NOTE — PROGRESS NOTES
Pt resting comfortably at this time.  Pt medicated for pain with PRN medication.  Plan:  Will continue to monitor and assess.

## 2017-03-27 VITALS
DIASTOLIC BLOOD PRESSURE: 74 MMHG | RESPIRATION RATE: 18 BRPM | BODY MASS INDEX: 28.27 KG/M2 | SYSTOLIC BLOOD PRESSURE: 113 MMHG | HEART RATE: 77 BPM | OXYGEN SATURATION: 96 % | HEIGHT: 64 IN | TEMPERATURE: 98.3 F | WEIGHT: 165.6 LBS

## 2017-03-27 PROCEDURE — 25000132 ZZH RX MED GY IP 250 OP 250 PS 637: Performed by: OBSTETRICS & GYNECOLOGY

## 2017-03-27 RX ORDER — IBUPROFEN 600 MG/1
600 TABLET, FILM COATED ORAL EVERY 6 HOURS PRN
Qty: 90 TABLET | Refills: 1 | Status: SHIPPED | OUTPATIENT
Start: 2017-03-27 | End: 2017-05-05

## 2017-03-27 RX ORDER — AMOXICILLIN 250 MG
1 CAPSULE ORAL 2 TIMES DAILY
Qty: 60 TABLET | Refills: 1 | Status: SHIPPED | OUTPATIENT
Start: 2017-03-27 | End: 2017-05-05

## 2017-03-27 RX ORDER — OXYCODONE AND ACETAMINOPHEN 5; 325 MG/1; MG/1
1-2 TABLET ORAL EVERY 6 HOURS PRN
Qty: 40 TABLET | Refills: 0 | Status: SHIPPED | OUTPATIENT
Start: 2017-03-27 | End: 2017-04-02

## 2017-03-27 RX ADMIN — SIMETHICONE CHEW TAB 80 MG 80 MG: 80 TABLET ORAL at 08:58

## 2017-03-27 RX ADMIN — ACETAMINOPHEN 975 MG: 325 TABLET, FILM COATED ORAL at 05:05

## 2017-03-27 RX ADMIN — SENNOSIDES AND DOCUSATE SODIUM 1 TABLET: 8.6; 5 TABLET ORAL at 08:42

## 2017-03-27 RX ADMIN — IBUPROFEN 800 MG: 400 TABLET ORAL at 08:42

## 2017-03-27 RX ADMIN — IBUPROFEN 800 MG: 400 TABLET ORAL at 02:32

## 2017-03-27 RX ADMIN — OXYCODONE HYDROCHLORIDE 5 MG: 5 TABLET ORAL at 08:43

## 2017-03-27 NOTE — PLAN OF CARE
Problem: Discharge Planning  Goal: Discharge Planning (Adult, OB, Behavioral, Peds)  Outcome: Adequate for Discharge Date Met:  03/27/17  Patient discharged to boarder status. Discharge instructions given. Encouraged to call for any problems, questions or concerns. RXs sent to pharmacy and picked up by spouse.

## 2017-03-27 NOTE — DISCHARGE SUMMARY
[facility] Discharge Summary    Fatimah Roy MRN# [id number]   Age: 26 year old YOB: 1990     Date of Admission:  3/24/2017  Date of Discharge::  3/27/2017  Admitting Physician:  Renetta Saeed MD  Discharge Physician:  Micky Cifuentes MD     Home clinic: [facility]          Admission Diagnoses:   Intrauterine pregnancy at 39w0d  GBS negative status  Fetal breech presentation          Discharge Diagnosis:   Viable male infant, delivered  S/p primary           Procedures:   Procedure(s): Primary lower transverse  section via Pfannenstiel skin incision       No other procedures performed during this admission           Medications Prior to Admission:     Prescriptions Prior to Admission   Medication Sig Dispense Refill Last Dose     Prenatal Vit-Fe Fumarate-FA (PRENATAL MULTIVITAMIN  PLUS IRON) 27-0.8 MG TABS Take 1 tablet by mouth daily   3/23/2017 at Unknown time     loratadine (CLARITIN) 10 MG tablet Take 10 mg by mouth daily   Taking             Discharge Medications:     Current Discharge Medication List      START taking these medications    Details   ibuprofen (ADVIL/MOTRIN) 600 MG tablet Take 1 tablet (600 mg) by mouth every 6 hours as needed for moderate pain  Qty: 90 tablet, Refills: 1    Associated Diagnoses: S/P       oxyCODONE-acetaminophen (PERCOCET) 5-325 MG per tablet Take 1-2 tablets by mouth every 6 hours as needed for pain maximum 6 tablet(s) per day  Qty: 40 tablet, Refills: 0    Associated Diagnoses: S/P       senna-docusate (SENOKOT-S;PERICOLACE) 8.6-50 MG per tablet Take 1 tablet by mouth 2 times daily  Qty: 60 tablet, Refills: 1    Associated Diagnoses: S/P          CONTINUE these medications which have NOT CHANGED    Details   Prenatal Vit-Fe Fumarate-FA (PRENATAL MULTIVITAMIN  PLUS IRON) 27-0.8 MG TABS Take 1 tablet by mouth daily      loratadine (CLARITIN) 10 MG tablet Take 10 mg by mouth daily                    Consultations:   No consultations were requested during this admission          Brief History of Labor or Admission:     Fatimah Roy is a 26 year old female who is 39w0d pregnant and being admitted for  for breech           Hospital Course:     25 yo G1 @ 39w0d with breech presentation  Primary low transverse  section     No complications  Viable male, complete breech, 7#5, Apgars 9/9, clear fluid, placenta 3vc, normal uterus/tubes/ovaries  Mother and infant stable.    The patient's hospital course was unremarkable.  She recovered as anticipated and experienced no post-operative complications.  On discharge, her pain was well controlled. Vaginal bleeding is similar to peak menstrual flow.  Voiding without difficulty.  Ambulating well and tolerating a normal diet.  No fever or significant wound drainage.  Breastfeeding well.  Infant is stable.  Passing flatus, but No bowel movement yet.  She was discharged on post-partum day #3.    Post-partum hemoglobin:   Hemoglobin   Date Value Ref Range Status   2017 11.5 (L) 11.7 - 15.7 g/dL Final   2017 13.1 11.7 - 15.7 g/dL Final   ]        Discharge Instructions and Follow-Up:   Discharge diet: Regular   Discharge activity: Your activity upon discharge: Activity as tolerated  No lifting or strenuous exercise for 6 weeks  No driving for 4 weeks or no driving while on narcotic pain medications  Nothing in the vagina including sex, douching or tampons for 6 weeks   Discharge follow-up: Follow up with primary care provider in 6 weeks   Wound care: Drink plenty of fluids  Ice to area for comfort  Keep wound clean and dry           Discharge Disposition:   Discharged to home      Attestation:  I have reviewed today's vital signs, notes, medications, labs and imaging.    Micky Cifuentes MD   [facility]

## 2017-03-27 NOTE — PLAN OF CARE
Problem: Postpartum ( Delivery) (Adult)  Goal: Signs and Symptoms of Listed Potential Problems Will be Absent or Manageable (Postpartum)  Signs and symptoms of listed potential problems will be absent or manageable by discharge/transition of care (reference Postpartum ( Delivery) (Adult) CPG).   Outcome: Improving  Data: Vital signs within normal limits. Postpartum checks within normal limits - see flow record. Patient eating and drinking normally. Patient able to empty bladder independently and is up ambulating. No apparent signs of infection. Incision healing well. Patient performing self cares and is able to care for infant.  Action: Patient medicated during the shift for pain. See MAR. Patient reassessed within 1 hour after each medication and pain was improved - patient stated she was comfortable. Patient education done about breast feeding. See flow record.  Response: Positive attachment behaviors observed with infant. Support person is present.   Plan: Anticipate discharge later today.

## 2017-03-27 NOTE — OP NOTE
DATE OF PROCEDURE:        PREOPERATIVE DIAGNOSES:   1.  A 39-week intrauterine pregnancy.   2.  Breech presentation.      POSTOPERATIVE DIAGNOSES:   1.  A 39-week intrauterine pregnancy.   2.  Breech presentation.      PROCEDURE:  Primary low transverse  section.      SURGEON:  Renetta Saeed MD      ASSISTANT:  Doretha Valdes NP      ANESTHESIA:  Spinal.      FRA SCORE:  2.      ESTIMATED BLOOD LOSS:  500 mL.      I's and O's:  Please see anesthesia record.      COMPLICATIONS:  None.      FINDINGS:  Viable infant male in complete breech presentation weighing 7 pounds 5 ounces with Apgars of 9 at 1 minute and 9 at 5 minutes, clear amniotic fluid, normal uterus, tubes and ovaries.  Placenta with a 3-vessel cord.      INDICATIONS:  Fatimah Roy is a 26-year-old  1, who presented at the office for routine prenatal care and was found to be in breech position.  After counseling regarding external cephalic version versus a primary  section, the patient and her  desired to proceed with primary  section.  The patient understood the risks and benefits of the procedure including risk of bleeding, infection, damage to surrounding structures and patient desired to proceed.      DESCRIPTION OF PROCEDURE:  The patient was brought to the operating room and spinal anesthesia was administered without difficulty.  She was prepped and draped in the normal sterile fashion in the dorsal supine position with a leftward tilt.  Pfannenstiel skin incision was made in the usual fashion and entry into the peritoneum was obtained sharply.  The Nuno O retractor was introduced and the lower uterine segment well visualized.  A transverse incision was created sharply with a scalpel.  Clear amniotic fluid was noted.  The infant buttocks were grasped and brought out through the uterine incision without difficulty.  The baby was delivered up to the level of the chest and the arms were swept across the chest  in a Pinard fashion and the head was delivered in a flexed position.  Cord was clamped and cut.  The infant was handed off to the waiting nurses.  Cord blood was obtained.  The placenta was expressed intact with a 3-vessel cord.  The uterus was exteriorized and cleared of all clot and debris.  The uterine incision was repaired with 0 Vicryl in a running locked fashion, followed by a second layer of 0 Monocryl in an imbricated fashion.  Excellent hemostasis was noted.  The uterus was returned to the abdomen.  The gutters were cleared of all clot and debris.  Reinspection of and the uterine incision revealed excellent hemostasis.  The Nuno O retractor was removed.  The peritoneum was reapproximated with 2-0 Vicryl in a running fashion.  The fascia was reapproximated with 0 Vicryl in a running fashion.  The subcutaneous tissue was reapproximated with 3-0 plain in a running fashion and the skin was closed with 4-0 V-Loc 90 in a subcuticular fashion.      The patient tolerated the procedure well.  Sponge, lap and needle counts were correct x2.  I was present for the entire procedure and patient was taken to the recovery room in stable condition.  Emy-operative antibiotics were given.      Surgical assistant was required for the surgery for her experience with retraction, achievement of hemostasis and delivery of the baby.         BREN BAUTISTA MD             D: 2017 13:39   T: 2017 01:30   MT: BARBARA#[id number]      Name:     WENDY KIRKLAND   MRN:      [id number]        Account:        [id number]   :      1990           Procedure Date: 2017      Document: [id number]       cc: Bren Monroy MD

## 2017-03-27 NOTE — PROGRESS NOTES
PPD#3  Doing well. Pain well controlled on oral pain medication. Tolerating regular diet. Voiding well. Ambulating without difficulty. Lochia is scant. Breast feeding.   Vitals:    17 0120 17 0943 17 1600 17 2310   BP: 126/70 122/70 126/68 120/75   Pulse: 74 88 90 82   Resp: 16 18 16 16   Temp: 97.9  F (36.6  C) 97.9  F (36.6  C) 98  F (36.7  C) 98  F (36.7  C)   TempSrc: Oral Oral Oral Oral   SpO2: 96%      Weight:       Height:         General Appearance: NAD  Abdomen: Soft, NT, ND. Fundus firm at U-2  Incision: Clean, dry and intact.   Extremities: NT, trace edema    Hemoglobin   Date Value Ref Range Status   2017 11.5 (L) 11.7 - 15.7 g/dL Final   2017 13.1 11.7 - 15.7 g/dL Final   ]    A/P: 26 year old  PPD#3 s/p scheduled PLTCS for breech presentation at 39 weeks.  Hemodynamically stable.   -- reviewed outpatient discharge precautions and expectations   -- will discharge home or stay boarding status for infant weight monitoring    Micky Cifuentes MD  [facility]

## 2017-03-27 NOTE — DISCHARGE INSTRUCTIONS
Follow up with OBGYN in 6 weeks    Postop  Birth Instructions    Activity       Do not lift more than 10 pounds for 6 weeks after surgery.  Ask family and friends for help when you need it.    No driving until you have stopped taking your pain medications (usually two weeks after surgery).    No heavy exercise or activity for 6 weeks.  Don't do anything that will put a strain on your surgery site.    Don't strain when using the toilet.  Your care team may prescribe a stool softener if you have problems with your bowel movements.     To care for your incision:       Keep the incision clean and dry.    Do not soak your incision in water. No swimming or hot tubs until it has fully healed. You may soak in the bathtub if the water level is below your incision.    Do not use peroxide, gel, cream, lotion, or ointment on your incision.    Adjust your clothes to avoid pressure on your surgery site (check the elastic in your underwear for example).     You may see a small amount of clear or pink drainage and this is normal.  Check with your health care provider:       If the drainage increases or has an odor.    If the incision reddens, you have swelling, or develop a rash.    If you have increased pain and the medicine we prescribed doesn't help.    If you have a fever above 100.4 F (38 C) with or without chills when placing thermometer under your tongue.   The area around your incision (surgery wound), will feel numb.  This is normal. The numbness should go away in less than a year.     Keep your hands clean:  Always wash your hands before touching your incision (surgery wound). This helps reduce your risk of infection. If your hands aren't dirty, you may use an alcohol hand-rub to clean your hands. Keep your nails clean and short.    Call your healthcare provider if you have any of these symptoms:       You soak a sanitary pad with blood within 1 hour, or you see blood clots larger than a golf ball.    Bleeding  that lasts more than 6 weeks.    Vaginal discharge that smells bad.    Severe pain, cramping or tenderness in your lower belly area.    A need to urinate more frequently (use the toilet more often), more urgently (use the toilet very quickly), or it burns when you urinate.    Nausea and vomiting.    Redness, swelling or pain around a vein in your leg.    Problems breastfeeding or a red or painful area on your breast.    Chest pain and cough or are gasping for air.    Problems with coping with sadness, anxiety or depression. If you have concerns about hurting yourself or the baby, call your provider immediately.      You have questions or concerns after you return home.     If you you feel sad, have difficulty sleeping, feel overwhelmed, anxious or have troubling thoughts you may call your clinic, or the HelpLine for Pregnancy & Postpartum Support [place]. ([facility] HelpLine [phone]) or online resource list  @ www.ppsupport[place].org    If you have concerns/questions after returning home, contact the nurse triage line [phone] or your primary care clinic.

## 2017-03-27 NOTE — PLAN OF CARE
Problem: Postpartum ( Delivery) (Adult)  Goal: Signs and Symptoms of Listed Potential Problems Will be Absent or Manageable (Postpartum)  Signs and symptoms of listed potential problems will be absent or manageable by discharge/transition of care (reference Postpartum ( Delivery) (Adult) CPG).   Outcome: Improving  Postpartum assessment WDL. VSS. Pain tolerable with Ibuprofen and Oxycodone. Breastfeeding going better today using SNS. Pumping after feeds. Patient caring for self and baby independently with assistance from . Will continue to monitor and assist with cares as needed.

## 2017-04-02 ENCOUNTER — MYC REFILL (OUTPATIENT)
Dept: OBGYN | Facility: CLINIC | Age: 27
End: 2017-04-02

## 2017-04-02 DIAGNOSIS — Z98.891 S/P C-SECTION: ICD-10-CM

## 2017-04-03 RX ORDER — OXYCODONE AND ACETAMINOPHEN 5; 325 MG/1; MG/1
1-2 TABLET ORAL EVERY 6 HOURS PRN
Qty: 40 TABLET | Refills: 0 | Status: SHIPPED | OUTPATIENT
Start: 2017-04-03 | End: 2017-04-03

## 2017-04-03 RX ORDER — OXYCODONE AND ACETAMINOPHEN 5; 325 MG/1; MG/1
1-2 TABLET ORAL EVERY 6 HOURS PRN
Qty: 40 TABLET | Refills: 0 | Status: SHIPPED | OUTPATIENT
Start: 2017-04-03 | End: 2017-05-05

## 2017-04-03 NOTE — TELEPHONE ENCOUNTER
Percocet has been renewed and signed. Please inform patient.     Micky Cifuentes MD  [facility]

## 2017-04-03 NOTE — TELEPHONE ENCOUNTER
Message from MyChart:  Original authorizing provider: MD Fatimah Castro would like a refill of the following medications:  oxyCODONE-acetaminophen (PERCOCET) 5-325 MG per tablet [Micky Cifuentes MD]    Preferred pharmacy: [facility]-[address]    Comment:  Still having a good amount of pain around the incision from my c section. I am almost out of this medication. I have tried taking just the ibuprofen but it does not provide much relief to my pain. Thank you for your help!

## 2017-04-03 NOTE — TELEPHONE ENCOUNTER
Pt called to ask that the RX be brought to the pharmacy here in [place] and to also let the pharmacy know that her  will be picking it up.    Mary Raman  [facility] [place]

## 2017-05-05 ENCOUNTER — PRENATAL OFFICE VISIT (OUTPATIENT)
Dept: OBGYN | Facility: CLINIC | Age: 27
End: 2017-05-05
Payer: COMMERCIAL

## 2017-05-05 VITALS
HEIGHT: 65 IN | WEIGHT: 156 LBS | BODY MASS INDEX: 25.99 KG/M2 | HEART RATE: 84 BPM | DIASTOLIC BLOOD PRESSURE: 69 MMHG | SYSTOLIC BLOOD PRESSURE: 99 MMHG

## 2017-05-05 DIAGNOSIS — Z30.011 ENCOUNTER FOR INITIAL PRESCRIPTION OF CONTRACEPTIVE PILLS: ICD-10-CM

## 2017-05-05 PROCEDURE — 99207 ZZC POST PARTUM EXAM: CPT | Performed by: OBSTETRICS & GYNECOLOGY

## 2017-05-05 RX ORDER — ETHYNODIOL DIACETATE AND ETHINYL ESTRADIOL 1 MG-35MCG
1 KIT ORAL DAILY
Qty: 84 TABLET | Refills: 3 | Status: SHIPPED | OUTPATIENT
Start: 2017-05-05 | End: 2018-03-26

## 2017-05-05 NOTE — PROGRESS NOTES
"6 week Postpartum Visit Note    S:  Fatimah Roy is here for her 6-week postpartum checkup. No complaints.    Delivery Date: 3/24/2017.    Delivering provider:  Renetta Saeed MD.    Type of delivery:  Scheduled PLTCS at 39 weeks fr breech presentation .     Infant gender:  boy, weight 7 pounds 5 oz.  Feeding Method:  Bottlefed.  Complications reported with feeding:  none, infant thriving .    Bleeding:  None.  Menses resumed:  No  Bowel/Urinary problems:  No    PHQ-9 score: 2    Contraception Planned:  oral contraceptive  She  has not had intercourse since delivery..    Current tobacco use:  No  Hx of Abuse:  No  ================================================================  ROS: 10 point ROS neg other than the symptoms noted above in the HPI.     O:  EXAM:  BP 99/69 (BP Location: Right arm, Patient Position: Chair, Cuff Size: Adult Regular)  Pulse 84  Ht 5' 4.5\" (1.638 m)  Wt 156 lb (70.8 kg)  LMP 06/25/2016  Breastfeeding? No  BMI 26.36 kg/m2    General: healthy, alert and no distress  Psych: negative for sleep disturbance, anxiety, nervous breakdown, depression, thoughts of self-harm, thoughts of hurting someone else, agitation and hallucinations  Breasts:  Non-tender  Abdomen: Soft, flat, non-tender  Incision:  well healed   Vulva:  Deferred  Vagina:  Deferred  Cervix:  Deferred.    Uterus:  Deferred    Adnexa:  Deferred  Recto-vaginal:   Deferred    A:   26 year old  s/p scheduled PLTCS here for 6 weeks postpartum visit. Doing well    P:  Contraception: Oral contraceptive pill prescribed; side effect profile reviewed  Feeding: Bottle  Return PRN or for annual exam    Micky Cifuentes MD  [facility]                  "

## 2017-05-05 NOTE — NURSING NOTE
"Chief Complaint   Patient presents with     Post Partum Exam       Initial BP 99/69 (BP Location: Right arm, Patient Position: Chair, Cuff Size: Adult Regular)  Pulse 84  Ht 5' 4.5\" (1.638 m)  Wt 156 lb (70.8 kg)  LMP 06/25/2016  Breastfeeding? No  BMI 26.36 kg/m2 Estimated body mass index is 26.36 kg/(m^2) as calculated from the following:    Height as of this encounter: 5' 4.5\" (1.638 m).    Weight as of this encounter: 156 lb (70.8 kg).  Medication Reconciliation: complete     Kristin Guerra CMA      "

## 2017-05-05 NOTE — MR AVS SNAPSHOT
"              After Visit Summary   5/5/2017    Fatimah Roy    MRN: [id number]           Patient Information     Date Of Birth          1990        Visit Information        Provider Department      5/5/2017 2:30 PM Micky Cifuentes MD [facility]        Today's Diagnoses     Routine postpartum follow-up    -  1    Encounter for initial prescription of contraceptive pills           Follow-ups after your visit        Who to contact     If you have questions or need follow up information about today's clinic visit or your schedule please contact [facility] directly at [phone].  Normal or non-critical lab and imaging results will be communicated to you by MyChart, letter or phone within 4 business days after the clinic has received the results. If you do not hear from us within 7 days, please contact the clinic through [employer] or phone. If you have a critical or abnormal lab result, we will notify you by phone as soon as possible.  Submit refill requests through [employer] or call your pharmacy and they will forward the refill request to us. Please allow 3 business days for your refill to be completed.          Additional Information About Your Visit        MyChart Information     [employer] gives you secure access to your electronic health record. If you see a primary care provider, you can also send messages to your care team and make appointments. If you have questions, please call your primary care clinic.  If you do not have a primary care provider, please call [phone] and they will assist you.        Care EveryWhere ID     This is your Care EveryWhere ID. This could be used by other organizations to access your [facility] medical records  [id number]        Your Vitals Were     Pulse Height Last Period Breastfeeding? BMI (Body Mass Index)       84 5' 4.5\" (1.638 m) 06/25/2016 No 26.36 kg/m2        Blood Pressure from Last 3 Encounters:   05/05/17 99/69   03/27/17 " 113/74   03/23/17 120/80    Weight from Last 3 Encounters:   05/05/17 156 lb (70.8 kg)   03/24/17 165 lb 9.6 oz (75.1 kg)   03/23/17 165 lb 9.6 oz (75.1 kg)              Today, you had the following     No orders found for display         Today's Medication Changes          These changes are accurate as of: 5/5/17  2:59 PM.  If you have any questions, ask your nurse or doctor.               Start taking these medicines.        Dose/Directions    ethynodiol-ethinyl estradiol 1-35 MG-MCG per tablet   Commonly known as:  KELNOR 1/35   Used for:  Encounter for initial prescription of contraceptive pills   Started by:  Micky Cifuentes MD        Dose:  1 tablet   Take 1 tablet by mouth daily 3 months on then off for one week   Quantity:  84 tablet   Refills:  3            Where to get your medicines      These medications were sent to [facility]-[address] [address]     Phone:  [phone]     ethynodiol-ethinyl estradiol 1-35 MG-MCG per tablet                Primary Care Provider Office Phone # Fax #    [facility] [phone]       [facility] [address] [place] [address]        Thank you!     Thank you for choosing [facility]  for your care. Our goal is always to provide you with excellent care. Hearing back from our patients is one way we can continue to improve our services. Please take a few minutes to complete the written survey that you may receive in the mail after your visit with us. Thank you!             Your Updated Medication List - Protect others around you: Learn how to safely use, store and throw away your medicines at www.disposemymeds.org.          This list is accurate as of: 5/5/17  2:59 PM.  Always use your most recent med list.                   Brand Name Dispense Instructions for use    CLARITIN 10 MG tablet   Generic drug:  loratadine       Take 10 mg by mouth daily       ethynodiol-ethinyl estradiol 1-35 MG-MCG per tablet    KELNOR 1/35    84 tablet    Take 1 tablet by mouth daily 3 months on then off for one week       prenatal multivitamin  plus iron 27-0.8 MG Tabs per tablet      Take 1 tablet by mouth daily Reported on 5/5/2017

## 2017-05-06 ASSESSMENT — PATIENT HEALTH QUESTIONNAIRE - PHQ9: SUM OF ALL RESPONSES TO PHQ QUESTIONS 1-9: 2

## 2017-08-07 ENCOUNTER — MYC MEDICAL ADVICE (OUTPATIENT)
Dept: FAMILY MEDICINE | Facility: CLINIC | Age: 27
End: 2017-08-07

## 2017-08-09 ENCOUNTER — OFFICE VISIT (OUTPATIENT)
Dept: FAMILY MEDICINE | Facility: CLINIC | Age: 27
End: 2017-08-09
Payer: COMMERCIAL

## 2017-08-09 VITALS — HEART RATE: 79 BPM | DIASTOLIC BLOOD PRESSURE: 72 MMHG | SYSTOLIC BLOOD PRESSURE: 114 MMHG

## 2017-08-09 DIAGNOSIS — J30.2 CHRONIC SEASONAL ALLERGIC RHINITIS, UNSPECIFIED TRIGGER: ICD-10-CM

## 2017-08-09 DIAGNOSIS — J45.40 MODERATE PERSISTENT ASTHMA WITHOUT COMPLICATION: Primary | ICD-10-CM

## 2017-08-09 DIAGNOSIS — R00.2 PALPITATIONS: ICD-10-CM

## 2017-08-09 LAB
ERYTHROCYTE [DISTWIDTH] IN BLOOD BY AUTOMATED COUNT: 12.3 % (ref 10–15)
HCT VFR BLD AUTO: 44 % (ref 35–47)
HGB BLD-MCNC: 15.1 G/DL (ref 11.7–15.7)
MCH RBC QN AUTO: 31.2 PG (ref 26.5–33)
MCHC RBC AUTO-ENTMCNC: 34.3 G/DL (ref 31.5–36.5)
MCV RBC AUTO: 91 FL (ref 78–100)
PLATELET # BLD AUTO: 287 10E9/L (ref 150–450)
RBC # BLD AUTO: 4.84 10E12/L (ref 3.8–5.2)
WBC # BLD AUTO: 10.4 10E9/L (ref 4–11)

## 2017-08-09 PROCEDURE — 84443 ASSAY THYROID STIM HORMONE: CPT | Performed by: FAMILY MEDICINE

## 2017-08-09 PROCEDURE — 93000 ELECTROCARDIOGRAM COMPLETE: CPT | Performed by: FAMILY MEDICINE

## 2017-08-09 PROCEDURE — 99214 OFFICE O/P EST MOD 30 MIN: CPT | Performed by: FAMILY MEDICINE

## 2017-08-09 PROCEDURE — 80048 BASIC METABOLIC PNL TOTAL CA: CPT | Performed by: FAMILY MEDICINE

## 2017-08-09 PROCEDURE — 85027 COMPLETE CBC AUTOMATED: CPT | Performed by: FAMILY MEDICINE

## 2017-08-09 PROCEDURE — 36415 COLL VENOUS BLD VENIPUNCTURE: CPT | Performed by: FAMILY MEDICINE

## 2017-08-09 RX ORDER — ALBUTEROL SULFATE 90 UG/1
2 AEROSOL, METERED RESPIRATORY (INHALATION) EVERY 6 HOURS PRN
Qty: 1 INHALER | Refills: 1 | Status: SHIPPED | OUTPATIENT
Start: 2017-08-09 | End: 2018-09-23

## 2017-08-09 NOTE — LETTER
My Asthma Action Plan  Name: Fatimah Roy   YOB: 1990  Date: 8/9/2017   My doctor: Pedro Luis Leavitt MD   My clinic: [facility]        My Control Medicine: Albuterol inhaler  My Rescue Medicine: Fluticasone inhaler   My Asthma Severity: moderate persistent  Avoid your asthma triggers: Unknown  patient had a baby in March, has had issues with asthma ever since.            GREEN ZONE   Good Control    I feel good    No cough or wheeze    Can work, sleep and play without asthma symptoms       Take your asthma control medicine every day.     1. If exercise triggers your asthma, take your rescue medication    15 minutes before exercise or sports, and    During exercise if you have asthma symptoms  2. Spacer to use with inhaler: If you have a spacer, make sure to use it with your inhaler             YELLOW ZONE Getting Worse  I have ANY of these:    I do not feel good    Cough or wheeze    Chest feels tight    Wake up at night   1. Keep taking your Green Zone medications  2. Start taking your rescue medicine:    every 20 minutes for up to 1 hour. Then every 4 hours for 24-48 hours.  3. If you stay in the Yellow Zone for more than 12-24 hours, contact your doctor.  4. If you do not return to the Green Zone in 12-24 hours or you get worse, start taking your oral steroid medicine if prescribed by your provider.           RED ZONE Medical Alert - Get Help  I have ANY of these:    I feel awful    Medicine is not helping    Breathing getting harder    Trouble walking or talking    Nose opens wide to breathe       1. Take your rescue medicine NOW  2. If your provider has prescribed an oral steroid medicine, start taking it NOW  3. Call your doctor NOW  4. If you are still in the Red Zone after 20 minutes and you have not reached your doctor:    Take your rescue medicine again and    Call 911 or go to the emergency room right away    See your regular doctor within 2 weeks of an Emergency Room  or Urgent Care visit for follow-up treatment.        Electronically signed by: Pedro Luis Leavitt, August 9, 2017    Annual Reminders:  Meet with Asthma Educator,  Flu Shot in the Fall, consider Pneumonia Vaccination for patients with asthma (aged 19 and older).    Pharmacy:    [id number] - [place], [place] - [address] [facility] - [place], [place] - [address] [facility]                    Asthma Triggers  How To Control Things That Make Your Asthma Worse    Triggers are things that make your asthma worse.  Look at the list below to help you find your triggers and what you can do about them.  You can help prevent asthma flare-ups by staying away from your triggers.      Trigger                                                          What you can do   Cigarette Smoke  Tobacco smoke can make asthma worse. Do not allow smoking in your home, car or around you.  Be sure no one smokes at a child s day care or school.  If you smoke, ask your health care provider for ways to help you quit.  Ask family members to quit too.  Ask your health care provider for a referral to Quit Plan to help you quit smoking, or call 2-264-985-PLAN.     Colds, Flu, Bronchitis  These are common triggers of asthma. Wash your hands often.  Don t touch your eyes, nose or mouth.  Get a flu shot every year.     Dust Mites  These are tiny bugs that live in cloth or carpet. They are too small to see. Wash sheets and blankets in hot water every week.   Encase pillows and mattress in dust mite proof covers.  Avoid having carpet if you can. If you have carpet, vacuum weekly.   Use a dust mask and HEPA vacuum.   Pollen and Outdoor Mold  Some people are allergic to trees, grass, or weed pollen, or molds. Try to keep your windows closed.  Limit time out doors when pollen count is high.   Ask you health care provider about taking medicine during allergy season.     Animal Dander  Some people are allergic to skin flakes,  urine or saliva from pets with fur or feathers. Keep pets with fur or feathers out of your home.    If you can t keep the pet outdoors, then keep the pet out of your bedroom.  Keep the bedroom door closed.  Keep pets off cloth furniture and away from stuffed toys.     Mice, Rats, and Cockroaches  Some people are allergic to the waste from these pests.   Cover food and garbage.  Clean up spills and food crumbs.  Store grease in the refrigerator.   Keep food out of the bedroom.   Indoor Mold  This can be a trigger if your home has high moisture. Fix leaking faucets, pipes, or other sources of water.   Clean moldy surfaces.  Dehumidify basement if it is damp and smelly.   Smoke, Strong Odors, and Sprays  These can reduce air quality. Stay away from strong odors and sprays, such as perfume, powder, hair spray, paints, smoke incense, paint, cleaning products, candles and new carpet.   Exercise or Sports  Some people with asthma have this trigger. Be active!  Ask your doctor about taking medicine before sports or exercise to prevent symptoms.    Warm up for 5-10 minutes before and after sports or exercise.     Other Triggers of Asthma  Cold air:  Cover your nose and mouth with a scarf.  Sometimes laughing or crying can be a trigger.  Some medicines and food can trigger asthma.

## 2017-08-09 NOTE — NURSING NOTE
"Chief Complaint   Patient presents with     Palpitations       Initial /72 (BP Location: Right arm, Patient Position: Chair, Cuff Size: Adult Regular)  Pulse 79 Estimated body mass index is 26.36 kg/(m^2) as calculated from the following:    Height as of 5/5/17: 5' 4.5\" (1.638 m).    Weight as of 5/5/17: 156 lb (70.8 kg).  Medication Reconciliation: complete    Health Maintenance that is potentially due pending provider review:  ACT    completed.    Is there anyone who you would like to be able to receive your results? No  If yes have patient fill out ARI      "

## 2017-08-09 NOTE — PROGRESS NOTES
SUBJECTIVE:                                                    Fatimah Roy is a 27 year old female who presents to clinic today for the following health issues:      Palpatations      Duration: 3 days    Description (location/character/radiation): Feels like heart is skipping a beat, some SOB, happening almost every day, lasting few seconds only, feels like different than her asthma symptoms    Intensity:  moderate    Accompanying signs and symptoms: SOB, no chest pain or sweating    History (similar episodes/previous evaluation): None    Precipitating or alleviating factors: None    Therapies tried and outcome: None    Grandfather  of MI in his 50s          Asthma Follow-Up    Was ACT completed today?    Yes    ACT Total Scores 2017   ACT TOTAL SCORE -   ASTHMA ER VISITS -   ASTHMA HOSPITALIZATIONS -   ACT TOTAL SCORE (Goal Greater than or Equal to 20) 14   In the past 12 months, how many times did you visit the emergency room for your asthma without being admitted to the hospital? 0   In the past 12 months, how many times were you hospitalized overnight because of your asthma? 0       Recent asthma triggers that patient is dealing with: patient had a baby in March, has had issues with asthma ever since.     Also known to have seasonal allergies, taking Claritin, would like to be seen by allergy specialist.         Problem list and histories reviewed & adjusted, as indicated.  Additional history: as documented    Patient Active Problem List   Diagnosis     CARDIOVASCULAR SCREENING; LDL GOAL LESS THAN 160     Intermittent asthma     Seasonal allergic rhinitis     Chronic allergic conjunctivitis     Prenatal care, first pregnancy     S/P      Past Surgical History:   Procedure Laterality Date      SECTION N/A 3/24/2017    Procedure:  SECTION;  Surgeon: Renetta Saeed MD;  Location: [facility]     ESOPHAGOSCOPY, GASTROSCOPY, DUODENOSCOPY (EGD), COMBINED  2012    Procedure:  COMBINED ESOPHAGOSCOPY, GASTROSCOPY, DUODENOSCOPY (EGD);  Gastroscopy;  Surgeon: Pete Arciniega MD;  Location: [facility]     MOUTH SURGERY      wisdom teeth       Social History   Substance Use Topics     Smoking status: Never Smoker     Smokeless tobacco: Never Used     Alcohol use 0.0 oz/week     0 Standard drinks or equivalent per week      Comment: rare- quit with pregnancy     Family History   Problem Relation Age of Onset     CANCER Mother      cervical      Hypertension Mother      GASTROINTESTINAL DISEASE Mother      diverticulitis     HEART DISEASE Maternal Grandmother      MI     HEART DISEASE Paternal Grandfather      MI     Hepatitis Father      Substance Abuse Father      recovered alcohol     Depression Father      C.A.D. No family hx of      DIABETES No family hx of      CEREBROVASCULAR DISEASE No family hx of      Breast Cancer No family hx of      Cancer - colorectal No family hx of          Current Outpatient Prescriptions   Medication Sig Dispense Refill     albuterol (PROAIR HFA/PROVENTIL HFA/VENTOLIN HFA) 108 (90 BASE) MCG/ACT Inhaler Inhale 2 puffs into the lungs every 6 hours as needed for shortness of breath / dyspnea or wheezing 1 Inhaler 1     fluticasone (FLOVENT DISKUS) 100 MCG/BLIST AEPB Inhale 1 puff into the lungs 2 times daily 3 Inhaler 1     ethynodiol-ethinyl estradiol (KELNOR 1/35) 1-35 MG-MCG per tablet Take 1 tablet by mouth daily 3 months on then off for one week 84 tablet 3     loratadine (CLARITIN) 10 MG tablet Take 10 mg by mouth daily       Prenatal Vit-Fe Fumarate-FA (PRENATAL MULTIVITAMIN  PLUS IRON) 27-0.8 MG TABS Take 1 tablet by mouth daily Reported on 5/5/2017       Allergies   Allergen Reactions     Nkda [No Known Drug Allergies]      Recent Labs   Lab Test  07/18/16   1415  01/15/15   1350  02/27/14   1603  07/24/12   1226   LDL   --    --   136*   --    HDL   --    --   66   --    TRIG   --    --   75   --    ALT   --    --    --   12   CR   --    --    --    0.75   GFRESTIMATED   --    --    --   >90   GFRESTBLACK   --    --    --   >90   POTASSIUM   --    --    --   4.0   TSH  1.43  1.23   --    --       BP Readings from Last 3 Encounters:   08/09/17 114/72   05/05/17 99/69   03/27/17 113/74    Wt Readings from Last 3 Encounters:   05/05/17 156 lb (70.8 kg)   03/24/17 165 lb 9.6 oz (75.1 kg)   03/23/17 165 lb 9.6 oz (75.1 kg)                  Labs reviewed in EPIC          Reviewed and updated as needed this visit by clinical [facility]       Reviewed and updated as needed this visit by Provider         ROS:  Constitutional, HEENT, cardiovascular, pulmonary, gi and gu systems are negative, except as otherwise noted.      OBJECTIVE:   /72 (BP Location: Right arm, Patient Position: Chair, Cuff Size: Adult Regular)  Pulse 79  There is no height or weight on file to calculate BMI.  GENERAL: healthy, alert and no distress  EYES: Eyes grossly normal to inspection, PERRL and conjunctivae and sclerae normal  HENT: ear canals and TM's normal, nose and mouth without ulcers or lesions  NECK: no adenopathy, no asymmetry, masses, or scars and thyroid normal to palpation  RESP: lungs clear to auscultation - no rales, rhonchi or wheezes  CV: regular rate and rhythm, normal S1 S2, no S3 or S4, no murmur, click or rub, no peripheral edema and peripheral pulses strong  ABDOMEN: soft, nontender, no hepatosplenomegaly, no masses and bowel sounds normal  MS: no gross musculoskeletal defects noted, no edema  SKIN: no suspicious lesions or rashes  NEURO: Normal strength and tone, mentation intact and speech normal  PSYCH: mentation appears normal, affect normal/bright    ECG: Normal sinus rhythm    ASSESSMENT/PLAN:       1. Moderate persistent asthma without complication  - ACT score 14  - Symptoms consistent with moderate persistent asthma  - Fluticasone prescribed and suggested to continue albuterol inhaler  - albuterol (PROAIR HFA/PROVENTIL HFA/VENTOLIN HFA) 108 (90 BASE)  MCG/ACT Inhaler; Inhale 2 puffs into the lungs every 6 hours as needed for shortness of breath / dyspnea or wheezing  Dispense: 1 Inhaler; Refill: 1  - fluticasone (FLOVENT DISKUS) 100 MCG/BLIST AEPB; Inhale 1 puff into the lungs 2 times daily  Dispense: 3 Inhaler; Refill: 1      2. Palpitations  - Symptoms are nonspecific  - EKG normal, reassurance provided  - CBC, BMP and TSH ordered to rule out any hematological, metabolic or thyroid gland disorder  - We'll do Holter monitor to rule out any rhythm abnormality  - CBC with platelets  - Basic metabolic panel  (Ca, Cl, CO2, Creat, Gluc, K, Na, BUN)  - EKG 12-lead complete w/read - Clinics  - TSH  - Holter Monitor 48 hour - Adult; Future      3. Chronic seasonal allergic rhinitis, unspecified trigger  - Allergy specialist referral placed as per patient wishes  - Continue Claritin  - ALLERGY/ASTHMA ADULT REFERRAL      Follow-up in 2 months or earlier if needed. All questions answered.      MEDICATIONS:   Orders Placed This Encounter   Medications     albuterol (PROAIR HFA/PROVENTIL HFA/VENTOLIN HFA) 108 (90 BASE) MCG/ACT Inhaler     Sig: Inhale 2 puffs into the lungs every 6 hours as needed for shortness of breath / dyspnea or wheezing     Dispense:  1 Inhaler     Refill:  1     fluticasone (FLOVENT DISKUS) 100 MCG/BLIST AEPB     Sig: Inhale 1 puff into the lungs 2 times daily     Dispense:  3 Inhaler     Refill:  1       Patient Instructions       Asthma Medicine     Get used to using your inhaled corticosteroid medicine before you brush your teeth. That way you will always rinse your mouth afterward.   Medicines play a key role in controlling asthma. Some help control asthma symptoms and prevent flare-ups. Others are used to treat symptoms when they occur. Always take your medicine as prescribed. Know the names of your medicines and how and when to use them. If you have any questions about your medicines, talk with your healthcare provider or  pharmacist.  Quick-relief medicine  Quick-relief (also called  rescue ) medicines work by relaxing the muscles around the airways. This helps ease symptoms such as coughing, wheezing, and shortness of breath. Keep your quick-relief inhaler with you at all times. Quick-relief medicines:    Are inhaled when needed and only when needed. Use your quick-relief medicine when you first notice your asthma is getting worse.    Start to open the airways within a few minutes after you use them.    Can help stop a flare-up once it has begun.    May be used before exercise as directed by your healthcare provider.  Long-term control medicine  Long-term control (also called  maintenance  or controller) medicines help reduce swelling and inflammation of the airways. Some keep the muscles around your airways relaxed. This makes the airways less sensitive to triggers and less likely to flare up. Long-term control medicines:    Are taken on a schedule. For most people, this is every day. They are taken even when you feel fine.    Help keep asthma under control so you re less likely to have symptoms.    Will not stop a flare-up once it has begun.     Inhaled corticosteroids  Inhaled corticosteroids are safe for long-term use. They are not the  steroids  that you hear about athletes abusing. They usually don't cause serious side effects. That s because they re inhaled directly into the lungs. The chance of minor side effects can be lowered even more if you:    Use a spacer with your inhaler. Ask your healthcare provider or pharmacist about using a spacer if you don't currently use one.    Rinse your mouth, gargle, and spit out the water after using your inhaled corticosteroid medicine.    Follow all instructions for cleaning inhalers and spacers.    Work with your healthcare provider to find the lowest dose that controls your asthma.      Tips for taking medicine  Remembering to take medicine each day can be hard for anyone. It can be  even harder to remember when you don t have symptoms. Try these tips:    Develop a routine. For example, take long-term controllers as part of getting ready for bed, before you brush your teeth in the morning, or both.    Make sure you understand what long-term controllers do and don t do.    Refill your prescriptions on time, or even ahead of time, so you don t run out.    Carry your quick-relief medicine with you. If you can, keep a spare quick-relief inhaler at work, at school, or in your gym bag.    When you travel, make sure you have enough medicine to last for your entire trip.    When traveling by air, keep your medicines with you, not packed in your luggage.    Make sure you know how to tell if your inhaler is empty. Ask your provider or pharmacist, or check the instructions that come with your inhaler.  Working with your healthcare provider  By working with your healthcare provider, you can get the most benefit from your medicine. Talk with your healthcare provider about:    Getting the right dose. Over time, your healthcare provider may raise or lower the dose of your controller medicine. The goal is to find the amount of medicine to keep asthma in control, without taking more than is needed.    Finding the right medicines for you. Each person is unique. It may take a few tries to find the right medicine or combination of medicines for you. If one medicine doesn t work well for you, another may work better.    Minimizing side effects. If you have side effects, don t just stop taking your medicine. Instead, call your healthcare provider. A new medicine or change in the amount of medicine may solve the problem.  Date Last Reviewed: 10/1/2016    8170-5578 [employer]. [address] [place] [address], [place] [address]. All rights reserved. This information is not intended as a substitute for professional medical care. Always follow your healthcare professional's instructions.        Fluticasone  inhalation powder  What is this medicine?  FLUTICASONE (floo TIK a sone) inhalation powder is a corticosteroid. It helps decrease inflammation in your lungs. This medicine is used to treat the symptoms of asthma. Never use this medicine for an acute asthma attack.  How should I use this medicine?  This medicine is for inhalation through the mouth. Rinse your mouth with water after use. Make sure not to swallow the water. Follow the directions on your prescription label. Do not use with a spacer device. Do not use more often than directed. Make sure that you are using your inhaler correctly. Ask you doctor or health care provider if you have any questions.  Talk to your pediatrician regarding the use of this medicine in children. While this drug may be prescribed for children for selected conditions, precautions do apply.  What side effects may I notice from receiving this medicine?  Side effects that you should report to your doctor or health care professional as soon as possible:    allergic reactions like skin rash, itching or hives, swelling of the face, lips, or tongue    breathing problems    changes in vision    flu-like symptoms    unusual swelling    white patches or sores in the mouth or throat  Side effects that usually do not require medical attention (report to your doctor or health care professional if they continue or are bothersome):    coughing, hoarseness, or throat irritation    dry mouth    headache    flushing    loss of taste, or unpleasant taste  What may interact with this medicine?    clarithromycin    conivaptan    dalfopristin; quinupristin    indinavir    ketoconazole    lopinavir; ritonavir    nefazodone    posaconazole    ritonavir    telithromycin    tipranavir    troleandomycin    voriconazole  What if I miss a dose?  If you miss a dose, use it as soon as you remember. If it is almost time for your next dose, use only that dose and continue with your regular schedule, spacing doses  "evenly. Do not use double or extra doses.  Where should I keep my medicine?  Keep out of the reach of children.  Store at room temperature between 15 and 30 degrees C (59 and 86 degrees F). Keep dry. Protect from heat and direct sunlight. Discard 6 weeks after removal from the foil pouch or after all of the blisters have been used (when the dose indicator reads 0), whichever comes first.  What should I tell my health care provider before I take this medicine?  They need to know if you have any of the following conditions:    bone problems    immune system problems    infection, like chickenpox, tuberculosis, herpes, or fungal infection    recent surgery or injury of mouth or throat    taking corticosteroids by mouth    an unusual or allergic reaction to fluticasone, lactose, other medicines, foods, dyes, or preservatives    pregnant or trying to get pregnant    breast-feeding  What should I watch for while using this medicine?  Visit your doctor or health care professional for regular checks on your progress. Check with your health care professional if your symptoms do not improve. If your symptoms get worse or if you need your short-acting inhalers more often, call your doctor right away. Do not stop taking your medicine unless your doctor tells you to.  Do not come in contact with people who have the chickenpox or the measles while you are taking this medicine. If you do, call your doctor right away.  Your inhaler has a dose counter and will tell you when only a few doses are left.  NOTE:This sheet is a summary. It may not cover all possible information. If you have questions about this medicine, talk to your doctor, pharmacist, or health care provider. Copyright  2017 [employer]          * Heart Palpitations    Palpitations refers to the feeling that your heart is beating hard, fast or irregular. Some people describe it as \"pounding\" or \"skipped beats\". Palpitations may occur in persons with heart disease, but " can also occur in healthy persons. Your doctor does not believe that anything dangerous is causing your symptoms at this time.  Heart-Related Causes:    Arrhythmia (a change from the heart's normal rhythm)    Disease of the heart valves  Non-Heart-Related Causes:    Certain medicines (such as asthma inhalers and decongestants)    Some herbal supplements, energy drinks and pills, and weight loss pills    Illegal stimulant drugs (such as cocaine, crank, methamphetamine, PCP)    Caffeine, alcohol and tobacco    Medical conditions such as thyroid disease, anemia, anxiety and panic disorder  Sometimes the cause cannot be found.  Home Care:  1. Avoid excess caffeine, alcohol, tobacco and any stimulant drugs.  2. Tell your doctor about any prescription or over-the-counter or herbal medicines you take.  Follow Up  with your doctor or as advised by our staff.  Get Prompt Medical Attention  if any of the following occur together with palpitations:    Weakness, dizziness, light-headed or fainting    Chest pain or shortness of breath    Rapid heart rate (over 120 beats per minute, at rest)    Palpitations that lasts over 20 minutes    Weakness of an arm or leg or one side of the face    Difficulty with speech or vision    [id number] [employer]. [address] [place] [id number]. All rights reserved. This information is not intended as a substitute for professional medical care. Always follow your healthcare professional's instructions.            Pedro Luis Leavitt MD  [facility]

## 2017-08-09 NOTE — MR AVS SNAPSHOT
After Visit Summary   8/9/2017    Fatimah Roy    MRN: [id number]           Patient Information     Date Of Birth          1990        Visit Information        Provider Department      8/9/2017 11:00 AM Pedro Luis Leavitt MD [facility]        Today's Diagnoses     Moderate persistent asthma without complication    -  1    Palpitations        Chronic seasonal allergic rhinitis, unspecified trigger          Care Instructions      Asthma Medicine     Get used to using your inhaled corticosteroid medicine before you brush your teeth. That way you will always rinse your mouth afterward.   Medicines play a key role in controlling asthma. Some help control asthma symptoms and prevent flare-ups. Others are used to treat symptoms when they occur. Always take your medicine as prescribed. Know the names of your medicines and how and when to use them. If you have any questions about your medicines, talk with your healthcare provider or pharmacist.  Quick-relief medicine  Quick-relief (also called  rescue ) medicines work by relaxing the muscles around the airways. This helps ease symptoms such as coughing, wheezing, and shortness of breath. Keep your quick-relief inhaler with you at all times. Quick-relief medicines:    Are inhaled when needed and only when needed. Use your quick-relief medicine when you first notice your asthma is getting worse.    Start to open the airways within a few minutes after you use them.    Can help stop a flare-up once it has begun.    May be used before exercise as directed by your healthcare provider.  Long-term control medicine  Long-term control (also called  maintenance  or controller) medicines help reduce swelling and inflammation of the airways. Some keep the muscles around your airways relaxed. This makes the airways less sensitive to triggers and less likely to flare up. Long-term control medicines:    Are taken on a schedule. For most people, this is  every day. They are taken even when you feel fine.    Help keep asthma under control so you re less likely to have symptoms.    Will not stop a flare-up once it has begun.     Inhaled corticosteroids  Inhaled corticosteroids are safe for long-term use. They are not the  steroids  that you hear about athletes abusing. They usually don't cause serious side effects. That s because they re inhaled directly into the lungs. The chance of minor side effects can be lowered even more if you:    Use a spacer with your inhaler. Ask your healthcare provider or pharmacist about using a spacer if you don't currently use one.    Rinse your mouth, gargle, and spit out the water after using your inhaled corticosteroid medicine.    Follow all instructions for cleaning inhalers and spacers.    Work with your healthcare provider to find the lowest dose that controls your asthma.      Tips for taking medicine  Remembering to take medicine each day can be hard for anyone. It can be even harder to remember when you don t have symptoms. Try these tips:    Develop a routine. For example, take long-term controllers as part of getting ready for bed, before you brush your teeth in the morning, or both.    Make sure you understand what long-term controllers do and don t do.    Refill your prescriptions on time, or even ahead of time, so you don t run out.    Carry your quick-relief medicine with you. If you can, keep a spare quick-relief inhaler at work, at school, or in your gym bag.    When you travel, make sure you have enough medicine to last for your entire trip.    When traveling by air, keep your medicines with you, not packed in your luggage.    Make sure you know how to tell if your inhaler is empty. Ask your provider or pharmacist, or check the instructions that come with your inhaler.  Working with your healthcare provider  By working with your healthcare provider, you can get the most benefit from your medicine. Talk with your  healthcare provider about:    Getting the right dose. Over time, your healthcare provider may raise or lower the dose of your controller medicine. The goal is to find the amount of medicine to keep asthma in control, without taking more than is needed.    Finding the right medicines for you. Each person is unique. It may take a few tries to find the right medicine or combination of medicines for you. If one medicine doesn t work well for you, another may work better.    Minimizing side effects. If you have side effects, don t just stop taking your medicine. Instead, call your healthcare provider. A new medicine or change in the amount of medicine may solve the problem.  Date Last Reviewed: 10/1/2016    3626-6715 [employer]. [address] [place] [address], [place] [address]. All rights reserved. This information is not intended as a substitute for professional medical care. Always follow your healthcare professional's instructions.        Fluticasone inhalation powder  What is this medicine?  FLUTICASONE (floo TIK a sone) inhalation powder is a corticosteroid. It helps decrease inflammation in your lungs. This medicine is used to treat the symptoms of asthma. Never use this medicine for an acute asthma attack.  How should I use this medicine?  This medicine is for inhalation through the mouth. Rinse your mouth with water after use. Make sure not to swallow the water. Follow the directions on your prescription label. Do not use with a spacer device. Do not use more often than directed. Make sure that you are using your inhaler correctly. Ask you doctor or health care provider if you have any questions.  Talk to your pediatrician regarding the use of this medicine in children. While this drug may be prescribed for children for selected conditions, precautions do apply.  What side effects may I notice from receiving this medicine?  Side effects that you should report to your doctor or health care professional as  soon as possible:    allergic reactions like skin rash, itching or hives, swelling of the face, lips, or tongue    breathing problems    changes in vision    flu-like symptoms    unusual swelling    white patches or sores in the mouth or throat  Side effects that usually do not require medical attention (report to your doctor or health care professional if they continue or are bothersome):    coughing, hoarseness, or throat irritation    dry mouth    headache    flushing    loss of taste, or unpleasant taste  What may interact with this medicine?    clarithromycin    conivaptan    dalfopristin; quinupristin    indinavir    ketoconazole    lopinavir; ritonavir    nefazodone    posaconazole    ritonavir    telithromycin    tipranavir    troleandomycin    voriconazole  What if I miss a dose?  If you miss a dose, use it as soon as you remember. If it is almost time for your next dose, use only that dose and continue with your regular schedule, spacing doses evenly. Do not use double or extra doses.  Where should I keep my medicine?  Keep out of the reach of children.  Store at room temperature between 15 and 30 degrees C (59 and 86 degrees F). Keep dry. Protect from heat and direct sunlight. Discard 6 weeks after removal from the foil pouch or after all of the blisters have been used (when the dose indicator reads 0), whichever comes first.  What should I tell my health care provider before I take this medicine?  They need to know if you have any of the following conditions:    bone problems    immune system problems    infection, like chickenpox, tuberculosis, herpes, or fungal infection    recent surgery or injury of mouth or throat    taking corticosteroids by mouth    an unusual or allergic reaction to fluticasone, lactose, other medicines, foods, dyes, or preservatives    pregnant or trying to get pregnant    breast-feeding  What should I watch for while using this medicine?  Visit your doctor or health care  "professional for regular checks on your progress. Check with your health care professional if your symptoms do not improve. If your symptoms get worse or if you need your short-acting inhalers more often, call your doctor right away. Do not stop taking your medicine unless your doctor tells you to.  Do not come in contact with people who have the chickenpox or the measles while you are taking this medicine. If you do, call your doctor right away.  Your inhaler has a dose counter and will tell you when only a few doses are left.  NOTE:This sheet is a summary. It may not cover all possible information. If you have questions about this medicine, talk to your doctor, pharmacist, or health care provider. Copyright  2017 [employer]          * Heart Palpitations    Palpitations refers to the feeling that your heart is beating hard, fast or irregular. Some people describe it as \"pounding\" or \"skipped beats\". Palpitations may occur in persons with heart disease, but can also occur in healthy persons. Your doctor does not believe that anything dangerous is causing your symptoms at this time.  Heart-Related Causes:    Arrhythmia (a change from the heart's normal rhythm)    Disease of the heart valves  Non-Heart-Related Causes:    Certain medicines (such as asthma inhalers and decongestants)    Some herbal supplements, energy drinks and pills, and weight loss pills    Illegal stimulant drugs (such as cocaine, crank, methamphetamine, PCP)    Caffeine, alcohol and tobacco    Medical conditions such as thyroid disease, anemia, anxiety and panic disorder  Sometimes the cause cannot be found.  Home Care:  1. Avoid excess caffeine, alcohol, tobacco and any stimulant drugs.  2. Tell your doctor about any prescription or over-the-counter or herbal medicines you take.  Follow Up  with your doctor or as advised by our staff.  Get Prompt Medical Attention  if any of the following occur together with palpitations:    Weakness, " dizziness, light-headed or fainting    Chest pain or shortness of breath    Rapid heart rate (over 120 beats per minute, at rest)    Palpitations that lasts over 20 minutes    Weakness of an arm or leg or one side of the face    Difficulty with speech or vision    5261-8808 [employer]. [address] [place] [address]. All rights reserved. This information is not intended as a substitute for professional medical care. Always follow your healthcare professional's instructions.                Follow-ups after your visit        Additional Services     ALLERGY/ASTHMA ADULT REFERRAL       Your provider has referred you to: ALAN: [facility] [phone] [email or web address]    Please be aware that coverage of these services is subject to the terms and limitations of your health insurance plan.  Call member services at your health plan with any benefit or coverage questions.      Please bring the following with you to your appointment:    (1) Any X-Rays, CTs or MRIs which have been performed.  Contact the facility where they were done to arrange for  prior to your scheduled appointment.    (2) List of current medications  (3) This referral request   (4) Any documents/labs given to you for this referral                  Future tests that were ordered for you today     Open Future Orders        Priority Expected Expires Ordered    Holter Monitor 48 hour - Adult Routine  9/23/2017 8/9/2017            Who to contact     If you have questions or need follow up information about today's clinic visit or your schedule please contact [facility] directly at [phone].  Normal or non-critical lab and imaging results will be communicated to you by MyChart, letter or phone within 4 business days after the clinic has received the results. If you do not hear from us within 7 days, please contact the clinic through MyChart or phone. If you have a  critical or abnormal lab result, we will notify you by phone as soon as possible.  Submit refill requests through [facility] or call your pharmacy and they will forward the refill request to us. Please allow 3 business days for your refill to be completed.          Additional Information About Your Visit        [employer] Information     [facility] gives you secure access to your electronic health record. If you see a primary care provider, you can also send messages to your care team and make appointments. If you have questions, please call your primary care clinic.  If you do not have a primary care provider, please call [phone] and they will assist you.        Care EveryWhere ID     This is your Care EveryWhere ID. This could be used by other organizations to access your [place] medical records  [id number]        Your Vitals Were     Pulse                   79            Blood Pressure from Last 3 Encounters:   08/09/17 114/72   05/05/17 99/69   03/27/17 113/74    Weight from Last 3 Encounters:   05/05/17 156 lb (70.8 kg)   03/24/17 165 lb 9.6 oz (75.1 kg)   03/23/17 165 lb 9.6 oz (75.1 kg)              We Performed the Following     ALLERGY/ASTHMA ADULT REFERRAL     Basic metabolic panel  (Ca, Cl, CO2, Creat, Gluc, K, Na, BUN)     CBC with platelets     EKG 12-lead complete w/read - Clinics     TSH          Today's Medication Changes          These changes are accurate as of: 8/9/17 11:27 AM.  If you have any questions, ask your nurse or doctor.               Start taking these medicines.        Dose/Directions    albuterol 108 (90 BASE) MCG/ACT Inhaler   Commonly known as:  PROAIR HFA/PROVENTIL HFA/VENTOLIN HFA   Used for:  Moderate persistent asthma without complication   Started by:  Pedro Luis Leavitt MD        Dose:  2 puff   Inhale 2 puffs into the lungs every 6 hours as needed for shortness of breath / dyspnea or wheezing   Quantity:  1 Inhaler   Refills:  1       fluticasone 100 MCG/BLIST Aepb   Commonly  known as:  FLOVENT DISKUS   Used for:  Moderate persistent asthma without complication   Started by:  Pedro Luis Leavitt MD        Dose:  1 puff   Inhale 1 puff into the lungs 2 times daily   Quantity:  3 Inhaler   Refills:  1            Where to get your medicines      These medications were sent to [facility] - [place] [id number] [facility]  [address] [facility] [address]     Phone:  [phone]     albuterol 108 (90 BASE) MCG/ACT Inhaler    fluticasone 100 MCG/BLIST Aepb                Primary Care Provider Office Phone # Fax #    Kristen Manrique, HECTOR [facility] [phone]       [address] [facility] [address]        Equal Access to Services     JE SHAW : Hadii betsy mcintyreo Rishi, waaxda luqadaha, qaybta kaalmada adeegyada, erika alva . So [facility] [phone].    ATENCIÓN: Si habla español, tiene a barrios disposición servicios gratuitos de asistencia lingüística. [facility] [phone].    We comply with applicable federal civil rights laws and [place] laws. We do not discriminate on the basis of race, color, national origin, age, disability sex, sexual orientation or gender identity.            Thank you!     Thank you for choosing [facility]  for your care. Our goal is always to provide you with excellent care. Hearing back from our patients is one way we can continue to improve our services. Please take a few minutes to complete the written survey that you may receive in the mail after your visit with us. Thank you!             Your Updated Medication List - Protect others around you: Learn how to safely use, store and throw away your medicines at www.disposemymeds.org.          This list is accurate as of: 8/9/17 11:27 AM.  Always use your most recent med list.                   Brand Name Dispense Instructions for use Diagnosis    albuterol 108 (90 BASE) MCG/ACT Inhaler    PROAIR HFA/PROVENTIL  HFA/VENTOLIN HFA    1 Inhaler    Inhale 2 puffs into the lungs every 6 hours as needed for shortness of breath / dyspnea or wheezing    Moderate persistent asthma without complication       CLARITIN 10 MG tablet   Generic drug:  loratadine      Take 10 mg by mouth daily        ethynodiol-ethinyl estradiol 1-35 MG-MCG per tablet    KELNOR 1/35    84 tablet    Take 1 tablet by mouth daily 3 months on then off for one week    Encounter for initial prescription of contraceptive pills       fluticasone 100 MCG/BLIST Aepb    FLOVENT DISKUS    3 Inhaler    Inhale 1 puff into the lungs 2 times daily    Moderate persistent asthma without complication       prenatal multivitamin plus iron 27-0.8 MG Tabs per tablet      Take 1 tablet by mouth daily Reported on 5/5/2017

## 2017-08-09 NOTE — LETTER
Fatimah Lozanon  [address] [facility] [address]        August 11, 2017    Dear ,    The results of your recent lab tests were within normal limits. Enclosed is a copy of these results.  If you have any further questions or problems, please contact our office.     Resulted Orders   CBC with platelets   Result Value Ref Range    WBC 10.4 4.0 - 11.0 10e9/L    RBC Count 4.84 3.8 - 5.2 10e12/L    Hemoglobin 15.1 11.7 - 15.7 g/dL    Hematocrit 44.0 35.0 - 47.0 %    MCV 91 78 - 100 fl    MCH 31.2 26.5 - 33.0 pg    MCHC 34.3 31.5 - 36.5 g/dL    RDW 12.3 10.0 - 15.0 %    Platelet Count 287 150 - 450 10e9/L    Sodium 135 133 - 144 mmol/L    Potassium 3.7 3.4 - 5.3 mmol/L    Chloride 102 94 - 109 mmol/L    Carbon Dioxide 27 20 - 32 mmol/L    Anion Gap 6 3 - 14 mmol/L    Glucose 75 70 - 99 mg/dL    Urea Nitrogen 12 7 - 30 mg/dL    Creatinine 0.64 0.52 - 1.04 mg/dL    GFR Estimate >90 >60 mL/min/1.7m2    Calcium 9.4 8.5 - 10.1 mg/dL   Result Value Ref Range    TSH 2.82 0.40 - 4.00 mU/L     Sincerely,    Pedro Luis Leavitt MD

## 2017-08-09 NOTE — PATIENT INSTRUCTIONS
Asthma Medicine     Get used to using your inhaled corticosteroid medicine before you brush your teeth. That way you will always rinse your mouth afterward.   Medicines play a key role in controlling asthma. Some help control asthma symptoms and prevent flare-ups. Others are used to treat symptoms when they occur. Always take your medicine as prescribed. Know the names of your medicines and how and when to use them. If you have any questions about your medicines, talk with your healthcare provider or pharmacist.  Quick-relief medicine  Quick-relief (also called  rescue ) medicines work by relaxing the muscles around the airways. This helps ease symptoms such as coughing, wheezing, and shortness of breath. Keep your quick-relief inhaler with you at all times. Quick-relief medicines:    Are inhaled when needed and only when needed. Use your quick-relief medicine when you first notice your asthma is getting worse.    Start to open the airways within a few minutes after you use them.    Can help stop a flare-up once it has begun.    May be used before exercise as directed by your healthcare provider.  Long-term control medicine  Long-term control (also called  maintenance  or controller) medicines help reduce swelling and inflammation of the airways. Some keep the muscles around your airways relaxed. This makes the airways less sensitive to triggers and less likely to flare up. Long-term control medicines:    Are taken on a schedule. For most people, this is every day. They are taken even when you feel fine.    Help keep asthma under control so you re less likely to have symptoms.    Will not stop a flare-up once it has begun.     Inhaled corticosteroids  Inhaled corticosteroids are safe for long-term use. They are not the  steroids  that you hear about athletes abusing. They usually don't cause serious side effects. That s because they re inhaled directly into the lungs. The chance of minor side effects can be  lowered even more if you:    Use a spacer with your inhaler. Ask your healthcare provider or pharmacist about using a spacer if you don't currently use one.    Rinse your mouth, gargle, and spit out the water after using your inhaled corticosteroid medicine.    Follow all instructions for cleaning inhalers and spacers.    Work with your healthcare provider to find the lowest dose that controls your asthma.      Tips for taking medicine  Remembering to take medicine each day can be hard for anyone. It can be even harder to remember when you don t have symptoms. Try these tips:    Develop a routine. For example, take long-term controllers as part of getting ready for bed, before you brush your teeth in the morning, or both.    Make sure you understand what long-term controllers do and don t do.    Refill your prescriptions on time, or even ahead of time, so you don t run out.    Carry your quick-relief medicine with you. If you can, keep a spare quick-relief inhaler at work, at school, or in your gym bag.    When you travel, make sure you have enough medicine to last for your entire trip.    When traveling by air, keep your medicines with you, not packed in your luggage.    Make sure you know how to tell if your inhaler is empty. Ask your provider or pharmacist, or check the instructions that come with your inhaler.  Working with your healthcare provider  By working with your healthcare provider, you can get the most benefit from your medicine. Talk with your healthcare provider about:    Getting the right dose. Over time, your healthcare provider may raise or lower the dose of your controller medicine. The goal is to find the amount of medicine to keep asthma in control, without taking more than is needed.    Finding the right medicines for you. Each person is unique. It may take a few tries to find the right medicine or combination of medicines for you. If one medicine doesn t work well for you, another may work  better.    Minimizing side effects. If you have side effects, don t just stop taking your medicine. Instead, call your healthcare provider. A new medicine or change in the amount of medicine may solve the problem.  Date Last Reviewed: 10/1/2016    2167-1101 [employer]. [address] [place] [address] [id number]. All rights reserved. This information is not intended as a substitute for professional medical care. Always follow your healthcare professional's instructions.        Fluticasone inhalation powder  What is this medicine?  FLUTICASONE (floo TIK a sone) inhalation powder is a corticosteroid. It helps decrease inflammation in your lungs. This medicine is used to treat the symptoms of asthma. Never use this medicine for an acute asthma attack.  How should I use this medicine?  This medicine is for inhalation through the mouth. Rinse your mouth with water after use. Make sure not to swallow the water. Follow the directions on your prescription label. Do not use with a spacer device. Do not use more often than directed. Make sure that you are using your inhaler correctly. Ask you doctor or health care provider if you have any questions.  Talk to your pediatrician regarding the use of this medicine in children. While this drug may be prescribed for children for selected conditions, precautions do apply.  What side effects may I notice from receiving this medicine?  Side effects that you should report to your doctor or health care professional as soon as possible:    allergic reactions like skin rash, itching or hives, swelling of the face, lips, or tongue    breathing problems    changes in vision    flu-like symptoms    unusual swelling    white patches or sores in the mouth or throat  Side effects that usually do not require medical attention (report to your doctor or health care professional if they continue or are bothersome):    coughing, hoarseness, or throat irritation    dry  mouth    headache    flushing    loss of taste, or unpleasant taste  What may interact with this medicine?    clarithromycin    conivaptan    dalfopristin; quinupristin    indinavir    ketoconazole    lopinavir; ritonavir    nefazodone    posaconazole    ritonavir    telithromycin    tipranavir    troleandomycin    voriconazole  What if I miss a dose?  If you miss a dose, use it as soon as you remember. If it is almost time for your next dose, use only that dose and continue with your regular schedule, spacing doses evenly. Do not use double or extra doses.  Where should I keep my medicine?  Keep out of the reach of children.  Store at room temperature between 15 and 30 degrees C (59 and 86 degrees F). Keep dry. Protect from heat and direct sunlight. Discard 6 weeks after removal from the foil pouch or after all of the blisters have been used (when the dose indicator reads 0), whichever comes first.  What should I tell my health care provider before I take this medicine?  They need to know if you have any of the following conditions:    bone problems    immune system problems    infection, like chickenpox, tuberculosis, herpes, or fungal infection    recent surgery or injury of mouth or throat    taking corticosteroids by mouth    an unusual or allergic reaction to fluticasone, lactose, other medicines, foods, dyes, or preservatives    pregnant or trying to get pregnant    breast-feeding  What should I watch for while using this medicine?  Visit your doctor or health care professional for regular checks on your progress. Check with your health care professional if your symptoms do not improve. If your symptoms get worse or if you need your short-acting inhalers more often, call your doctor right away. Do not stop taking your medicine unless your doctor tells you to.  Do not come in contact with people who have the chickenpox or the measles while you are taking this medicine. If you do, call your doctor right  "away.  Your inhaler has a dose counter and will tell you when only a few doses are left.  NOTE:This sheet is a summary. It may not cover all possible information. If you have questions about this medicine, talk to your doctor, pharmacist, or health care provider. Copyright  2017 [employer]          * Heart Palpitations    Palpitations refers to the feeling that your heart is beating hard, fast or irregular. Some people describe it as \"pounding\" or \"skipped beats\". Palpitations may occur in persons with heart disease, but can also occur in healthy persons. Your doctor does not believe that anything dangerous is causing your symptoms at this time.  Heart-Related Causes:    Arrhythmia (a change from the heart's normal rhythm)    Disease of the heart valves  Non-Heart-Related Causes:    Certain medicines (such as asthma inhalers and decongestants)    Some herbal supplements, energy drinks and pills, and weight loss pills    Illegal stimulant drugs (such as cocaine, crank, methamphetamine, PCP)    Caffeine, alcohol and tobacco    Medical conditions such as thyroid disease, anemia, anxiety and panic disorder  Sometimes the cause cannot be found.  Home Care:  1. Avoid excess caffeine, alcohol, tobacco and any stimulant drugs.  2. Tell your doctor about any prescription or over-the-counter or herbal medicines you take.  Follow Up  with your doctor or as advised by our staff.  Get Prompt Medical Attention  if any of the following occur together with palpitations:    Weakness, dizziness, light-headed or fainting    Chest pain or shortness of breath    Rapid heart rate (over 120 beats per minute, at rest)    Palpitations that lasts over 20 minutes    Weakness of an arm or leg or one side of the face    Difficulty with speech or vision    4653-5918 [employer]. [address] [place] [address] 97345. All rights reserved. This information is not intended as a substitute for professional medical care. Always " follow your healthcare professional's instructions.

## 2017-08-10 LAB
ANION GAP SERPL CALCULATED.3IONS-SCNC: 6 MMOL/L (ref 3–14)
BUN SERPL-MCNC: 12 MG/DL (ref 7–30)
CALCIUM SERPL-MCNC: 9.4 MG/DL (ref 8.5–10.1)
CHLORIDE SERPL-SCNC: 102 MMOL/L (ref 94–109)
CO2 SERPL-SCNC: 27 MMOL/L (ref 20–32)
CREAT SERPL-MCNC: 0.64 MG/DL (ref 0.52–1.04)
GFR SERPL CREATININE-BSD FRML MDRD: NORMAL ML/MIN/1.7M2
GLUCOSE SERPL-MCNC: 75 MG/DL (ref 70–99)
POTASSIUM SERPL-SCNC: 3.7 MMOL/L (ref 3.4–5.3)
SODIUM SERPL-SCNC: 135 MMOL/L (ref 133–144)
TSH SERPL DL<=0.005 MIU/L-ACNC: 2.82 MU/L (ref 0.4–4)

## 2017-08-10 ASSESSMENT — ASTHMA QUESTIONNAIRES: ACT_TOTALSCORE: 14

## 2017-08-18 ENCOUNTER — OFFICE VISIT (OUTPATIENT)
Dept: ALLERGY | Facility: CLINIC | Age: 27
End: 2017-08-18
Payer: COMMERCIAL

## 2017-08-18 ENCOUNTER — HOSPITAL ENCOUNTER (OUTPATIENT)
Dept: CARDIOLOGY | Facility: CLINIC | Age: 27
Discharge: HOME | End: 2017-08-18
Attending: FAMILY MEDICINE | Admitting: FAMILY MEDICINE
Payer: COMMERCIAL

## 2017-08-18 VITALS
SYSTOLIC BLOOD PRESSURE: 112 MMHG | HEIGHT: 65 IN | HEART RATE: 77 BPM | WEIGHT: 165.57 LBS | BODY MASS INDEX: 27.58 KG/M2 | OXYGEN SATURATION: 96 % | DIASTOLIC BLOOD PRESSURE: 80 MMHG

## 2017-08-18 DIAGNOSIS — R00.2 PALPITATIONS: ICD-10-CM

## 2017-08-18 DIAGNOSIS — J45.20 INTERMITTENT ASTHMA, UNCOMPLICATED: Primary | ICD-10-CM

## 2017-08-18 DIAGNOSIS — J31.0 OTHER CHRONIC RHINITIS: ICD-10-CM

## 2017-08-18 DIAGNOSIS — H10.45 CHRONIC ALLERGIC CONJUNCTIVITIS: ICD-10-CM

## 2017-08-18 LAB
FEF 25/75: NORMAL
FEV-1: NORMAL
FEV1/FVC: NORMAL
FVC: NORMAL

## 2017-08-18 PROCEDURE — 94060 EVALUATION OF WHEEZING: CPT | Performed by: ALLERGY & IMMUNOLOGY

## 2017-08-18 PROCEDURE — 93226 XTRNL ECG REC<48 HR SCAN A/R: CPT

## 2017-08-18 PROCEDURE — 99244 OFF/OP CNSLTJ NEW/EST MOD 40: CPT | Mod: 25 | Performed by: ALLERGY & IMMUNOLOGY

## 2017-08-18 PROCEDURE — 93227 XTRNL ECG REC<48 HR R&I: CPT | Performed by: INTERNAL MEDICINE

## 2017-08-18 RX ORDER — FLUTICASONE PROPIONATE 50 MCG
2 SPRAY, SUSPENSION (ML) NASAL DAILY
Qty: 1 BOTTLE | Refills: 3 | Status: SHIPPED | OUTPATIENT
Start: 2017-08-18 | End: 2017-09-06

## 2017-08-18 RX ORDER — AZELASTINE 1 MG/ML
2 SPRAY, METERED NASAL 2 TIMES DAILY PRN
Qty: 30 ML | Refills: 3 | Status: SHIPPED | OUTPATIENT
Start: 2017-08-18 | End: 2017-09-06

## 2017-08-18 RX ORDER — CETIRIZINE HYDROCHLORIDE 10 MG/1
10 TABLET ORAL DAILY
COMMUNITY

## 2017-08-18 RX ORDER — OLOPATADINE HYDROCHLORIDE 2 MG/ML
1 SOLUTION/ DROPS OPHTHALMIC DAILY PRN
Qty: 2.5 ML | Refills: 3 | Status: SHIPPED | OUTPATIENT
Start: 2017-08-18 | End: 2017-09-06

## 2017-08-18 ASSESSMENT — ENCOUNTER SYMPTOMS
EYE REDNESS: 0
HEADACHES: 1
DIARRHEA: 0
VOMITING: 0
SHORTNESS OF BREATH: 1
COUGH: 1
EYE ITCHING: 0
ADENOPATHY: 0
RHINORRHEA: 1
WHEEZING: 1
FEVER: 0
MYALGIAS: 0
FACIAL SWELLING: 0
NAUSEA: 0
ARTHRALGIAS: 0
ACTIVITY CHANGE: 0
EYE DISCHARGE: 0
CHILLS: 0
SINUS PRESSURE: 0
CHEST TIGHTNESS: 0

## 2017-08-18 NOTE — NURSING NOTE
"Chief Complaint   Patient presents with     Allergy Consult     Ref by Dr. Leavitt for nasal and eye sx      Asthma Consult     Ref by Dr. Leavitt       Initial /80 (BP Location: Left arm, Patient Position: Chair, Cuff Size: Adult Regular)  Pulse 77  Ht 5' 4.57\" (1.64 m)  Wt 165 lb 9.1 oz (75.1 kg)  SpO2 96%  BMI 27.92 kg/m2 Estimated body mass index is 27.92 kg/(m^2) as calculated from the following:    Height as of this encounter: 5' 4.57\" (1.64 m).    Weight as of this encounter: 165 lb 9.1 oz (75.1 kg).  Medication Reconciliation: complete    "

## 2017-08-18 NOTE — NURSING NOTE
The following medication was given:     MEDICATION: Albuterol Sulfate 0.083%  ROUTE: Inhale  SITE: mouth  DOSE: 2.5 mg/3 mL  LOT #: [id number]  :  [employer]  EXPIRATION DATE:  2/2019  NDC#: 4906-7564-24

## 2017-08-18 NOTE — PROGRESS NOTES
SUBJECTIVE:                                                                   Fatimah Roy presents today to our Allergy Clinic at [facility]; she is being seen in consultation at the request of Dr. Leavitt.  As you know, she is a 27 year old female with presumptive allergies and history of asthma.  6-7 years ago, she began to have perennial chronic nasal symptoms (itch, clear rhinorrhea, stuffiness, and sneezing) and ocular symptoms (itching and watering, puffiness of the eyelids).  She is not sure if she is worse around dogs/cats. She has 1 cat and 1 dog.   Tried Flonase once. Tried loratadine and cetirizine. Cetirizine seems to be working better. On cetirizine only, her symptoms are 75-80% controlled.    There is no history of PE tubes, sinus surgeries, or T/A.     Since her childhood she had problems with shortness of breath on exertion. In 2010, she had coughing fits, and she was prescribed albuterol inhaler, that she needed intermittently. At that time albuterol would start working within the first minutes.   She was pregnant from March 2016-March 2017. In January she would develop sudden episodes of cough, wheezing and chest tightness. She would start taking albuterol and it woold help her symptoms.   She delivered in March, but continues having symptoms, that several weeks ago started to be associated with a sensation like heart is skipping a beat.   She has her symptoms once 2-3 times a week. Usually it happens in the evening or overnight. She is currently wearing a heart monitor. Yesterday, she was started on Flovent 100 1 puff BID.  There has been no use of prednisone for the last year. No history of hospitalizations for asthma.  No history of intubations.     She thinks she had CXR-confirmed pneumonia 1 time in her life.        Patient Active Problem List   Diagnosis     CARDIOVASCULAR SCREENING; LDL GOAL LESS THAN 160     Intermittent asthma     Seasonal allergic rhinitis      Chronic allergic conjunctivitis     Prenatal care, first pregnancy     S/P        Past Medical History:   Diagnosis Date     Chickenpox      Uncomplicated asthma       Problem (# of Occurrences) Relation (Name,Age of Onset)    Allergy (Severe) (1) Mother: Bee Venom and Fire Ant    CANCER (1) Mother: cervical     Depression (1) Father    GASTROINTESTINAL DISEASE (1) Mother: diverticulitis    HEART DISEASE (2) Maternal Grandmother: MI, Paternal Grandfather: MI    Hepatitis (1) Father    Hypertension (1) Mother    Substance Abuse (1) Father: recovered alcohol       Negative family history of: C.A.D., DIABETES, CEREBROVASCULAR DISEASE, Breast Cancer, Cancer - colorectal        Past Surgical History:   Procedure Laterality Date      SECTION N/A 3/24/2017    Procedure:  SECTION;  Surgeon: Renetta Saeed MD;  Location: [facility]     ESOPHAGOSCOPY, GASTROSCOPY, DUODENOSCOPY (EGD), COMBINED  2012    Procedure: COMBINED ESOPHAGOSCOPY, GASTROSCOPY, DUODENOSCOPY (EGD);  Gastroscopy;  Surgeon: Pete Arciniega MD;  Location: [facility]     MOUTH SURGERY      wisdom teeth     Social History     Social History     Marital status:      Spouse name: N/A     Number of children: N/A     Years of education: N/A     Social History Main Topics     Smoking status: Never Smoker     Smokeless tobacco: Never Used     Alcohol use 0.0 oz/week     0 Standard drinks or equivalent per week      Comment: rare- quit with pregnancy     Drug use: No     Sexual activity: No     Other Topics Concern     None     Social History Narrative    2017        ENVIRONMENTAL HISTORY: [place] The home is heated with a forced air and gas furnace. They does have central air conditioning. The patient's bedroom is furnished with feather/wool bedding or pillows, carpeting in bedroom and fabric window coverings.  Pets inside the house include 1 cat and 1 dog. There is not  history of cockroach or mice infestation. There are no smokers in the house.  The house does not have a damp basement.                Review of Systems   Constitutional: Negative for activity change, chills and fever.   HENT: Positive for congestion and rhinorrhea. Negative for ear discharge, facial swelling, nosebleeds, postnasal drip, sinus pressure and sneezing.    Eyes: Negative for discharge, redness and itching.   Respiratory: Positive for cough, shortness of breath and wheezing. Negative for chest tightness.    Cardiovascular: Negative for chest pain.   Gastrointestinal: Negative for diarrhea, nausea and vomiting.   Musculoskeletal: Negative for arthralgias and myalgias.   Skin: Negative for rash.   Neurological: Positive for headaches.   Hematological: Negative for adenopathy.   Psychiatric/Behavioral: Negative for behavioral problems and self-injury.           Current Outpatient Prescriptions:      cetirizine (ZYRTEC) 10 MG tablet, Take 10 mg by mouth daily, Disp: , Rfl:      azelastine (ASTELIN) 0.1 % spray, Spray 2 sprays into both nostrils 2 times daily as needed, Disp: 30 mL, Rfl: 3     fluticasone (FLONASE) 50 MCG/ACT spray, Spray 2 sprays into both nostrils daily, Disp: 1 Bottle, Rfl: 3     olopatadine HCl (PATADAY) 0.2 % SOLN, Place 1 drop into both eyes daily as needed, Disp: 2.5 mL, Rfl: 3     albuterol (PROAIR HFA/PROVENTIL HFA/VENTOLIN HFA) 108 (90 BASE) MCG/ACT Inhaler, Inhale 2 puffs into the lungs every 6 hours as needed for shortness of breath / dyspnea or wheezing, Disp: 1 Inhaler, Rfl: 1     fluticasone (FLOVENT DISKUS) 100 MCG/BLIST AEPB, Inhale 1 puff into the lungs 2 times daily, Disp: 3 Inhaler, Rfl: 1     ethynodiol-ethinyl estradiol (KELNOR 1/35) 1-35 MG-MCG per tablet, Take 1 tablet by mouth daily 3 months on then off for one week, Disp: 84 tablet, Rfl: 3  Immunization History   Administered Date(s) Administered     HPVQuadrivalent 08/18/2011     Influenza (IIV3) 11/09/2007      "Influenza Vaccine IM 3yrs+ 4 Valent IIV4 10/17/2016     Meningococcal (Menactra ) 08/18/2011     TDAP Vaccine (Adacel) 09/14/2009, 01/24/2017     Allergies   Allergen Reactions     Nkda [No Known Drug Allergies]      OBJECTIVE:                                                                 /80 (BP Location: Left arm, Patient Position: Chair, Cuff Size: Adult Regular)  Pulse 77  Ht 5' 4.57\" (1.64 m)  Wt 165 lb 9.1 oz (75.1 kg)  SpO2 96%  BMI 27.92 kg/m2        Physical Exam   Constitutional: She is oriented to person, place, and time. No distress.   HENT:   Head: Normocephalic and atraumatic.   Right Ear: Tympanic membrane, external ear and ear canal normal.   Left Ear: Tympanic membrane, external ear and ear canal normal.   Nose: Mucosal edema (mild) and rhinorrhea (scant, clear) present.   Mouth/Throat: Oropharynx is clear and moist and mucous membranes are normal.   Eyes: Conjunctivae are normal. Right eye exhibits no discharge. Left eye exhibits no discharge.   Neck: Normal range of motion. Neck supple.   Cardiovascular: Normal rate, regular rhythm and normal heart sounds.    No murmur heard.  Pulmonary/Chest: Effort normal and breath sounds normal. No respiratory distress. She has no wheezes. She has no rales.   Musculoskeletal: Normal range of motion.   Neurological: She is alert and oriented to person, place, and time.   Skin: Skin is warm. No rash noted. She is not diaphoretic.   Psychiatric: Affect normal.   Nursing note and vitals reviewed.    WORKUP:   SPIROMETRY  initial FVC 3.78L (97% of predicted); after bronchodilator 3.86L (2% change)   initial FEV1 3.11L (94% of predicted); after bronchodilator 3.31L (6% change)  initial FEV1/FVC 82 %; after bronchodilator 86%  initial FEF 25%-75% 3.26L/s (88% of predicted); after bronchodilator 4.04L/s (24% change)    The office spirometry performed today does not suggest an obstruction. There is no reversibility after nebulized albuterol " noted.    Asthma Control Test (ACT) total score: 15      ASSESSMENT/PLAN:    Problem List Items Addressed This Visit        Respiratory    1. Intermittent asthma - Primary  Historical diagnosis. At this point, it is unclear how much her symptoms are induced by asthma versus cardiac etiology.  -Considering that she is using albuterol inhaler about 3 times a week, I agree with initiation of Flovent 100 mcg 1 puff twice a day, and would not change it.  -Continue using albuterol inhaler 2-4 puffs every 4 hours as needed for persistent cough/chest tightness/wheezing/shortness of breath.  -Ordered chest x-ray to be done once heart monitor was removed.    Relevant Medications            fluticasone (FLONASE) 50 MCG/ACT spray    Other Relevant Orders    BRONCHODILATION RESPONSE, PRE/POST ADMIN (Completed)    ALBUTEROL UNIT DOSE, 1 MG (Completed)    XR Chest 2 Views       Infectious/Inflammatory    2. Chronic allergic conjunctivitis  If the combination of intranasal fluticasone and azelastine would not help her ocular symptoms, I recommend a trial of Pataday 1 drop in each eye once a day as needed.    Relevant Medications        azelastine (ASTELIN) 0.1 % spray    fluticasone (FLONASE) 50 MCG/ACT spray    olopatadine HCl (PATADAY) 0.2 % SOLN      Other Visit Diagnoses     3. Other chronic rhinitis      Unable to perform percutaneous skin puncture testing since she has been taking cetirizine on a daily basis.  -Start intranasal fluticasone 2 sprays in each nostril once a day.  -Start azelastine 2 sprays in each nostril twice a day as needed. She will stop it 3 days before skin test.  -She took cetirizine yesterday. She will hold it for 1 week and we shall see her back at that time for percutaneous skin puncture testing for aeroallergens.    Relevant Medications        azelastine (ASTELIN) 0.1 % spray    fluticasone (FLONASE) 50 MCG/ACT spray            Follow up in 1 week or sooner if needed.    Thank you for allowing us to  participate in the care of this patient. Please feel free to contact us if there are any questions or concerns about the patient.    Disclaimer: This note consists of symbols derived from keyboarding, dictation and/or voice recognition software. As a result, there may be errors in the script that have gone undetected. Please consider this when interpreting information found in this chart.    Lars Antony MD, [facility]  Allergy, Asthma and Immunology  [place], [place] and [place]

## 2017-08-18 NOTE — Clinical Note
Dear Dr. Leavitt The patient was seen in Allergy Clinic for consultation.   Please see the office visit note for more details. Thank you!

## 2017-08-18 NOTE — PATIENT INSTRUCTIONS
Hold on cetirizine  -Use azelastine 2 sprays in each nostril twice a day when necessary. Stop 3 days before skin test.   -start Flonase 2 sprays/each nostril once a day. Do not stop it.  Pataday 1 drop/eye once daily as needed. Stop it 24 hrs before skin test.    DO NOT STOP YOUR INHALERS!   Get chest xray done.

## 2017-08-18 NOTE — MR AVS SNAPSHOT
After Visit Summary   8/18/2017    Fatimah Roy    MRN: [id number]           Patient Information     Date Of Birth          1990        Visit Information        Provider Department      8/18/2017 10:20 AM Lars Antony MD [facility]        Today's Diagnoses     Intermittent asthma, uncomplicated    -  1    Other form of dyspnea        Other chronic rhinitis        Chronic allergic conjunctivitis          Care Instructions    Hold on cetirizine  -Use azelastine 2 sprays in each nostril twice a day when necessary. Stop 3 days before skin test.   -start Flonase 2 sprays/each nostril once a day. Do not stop it.  Pataday 1 drop/eye once daily as needed. Stop it 24 hrs before skin test.    DO NOT STOP YOUR INHALERS!   Get chest xray done.          Follow-ups after your visit        Follow-up notes from your care team     Return in about 1 week (around 8/25/2017).      Your next 10 appointments already scheduled     Aug 25, 2017 10:00 AM CDT   Return Visit with Lars Antony MD   [facility]    [address] [address] [phone]              Future tests that were ordered for you today     Open Future Orders        Priority Expected Expires Ordered    XR Chest 2 Views Routine 8/18/2017 8/18/2018 8/18/2017            Who to contact     If you have questions or need follow up information about today's clinic visit or your schedule please contact [facility] directly at [phone].  Normal or non-critical lab and imaging results will be communicated to you by MyChart, letter or phone within 4 business days after the clinic has received the results. If you do not hear from us within 7 days, please contact the clinic through MyChart or phone. If you have a critical or abnormal lab result, we will notify you by phone as soon as possible.  Submit refill requests through [employer] or call your pharmacy and they  "will forward the refill request to us. Please allow 3 business days for your refill to be completed.          Additional Information About Your Visit        [employer] Information     [employer] gives you secure access to your electronic health record. If you see a primary care provider, you can also send messages to your care team and make appointments. If you have questions, please call your primary care clinic.  If you do not have a primary care provider, please call [phone] and they will assist you.        Care EveryWhere ID     This is your Care EveryWhere ID. This could be used by other organizations to access your [place] medical records  [id number]        Your Vitals Were     Pulse Height Pulse Oximetry BMI (Body Mass Index)          77 5' 4.57\" (1.64 m) 96% 27.92 kg/m2         Blood Pressure from Last 3 Encounters:   08/18/17 112/80   08/09/17 114/72   05/05/17 99/69    Weight from Last 3 Encounters:   08/18/17 165 lb 9.1 oz (75.1 kg)   05/05/17 156 lb (70.8 kg)   03/24/17 165 lb 9.6 oz (75.1 kg)              We Performed the Following     ALBUTEROL UNIT DOSE, 1 MG     BRONCHODILATION RESPONSE, PRE/POST ADMIN          Today's Medication Changes          These changes are accurate as of: 8/18/17 11:26 AM.  If you have any questions, ask your nurse or doctor.               Start taking these medicines.        Dose/Directions    azelastine 0.1 % spray   Commonly known as:  ASTELIN   Used for:  Other chronic rhinitis   Started by:  Lars Antony MD        Dose:  2 spray   Spray 2 sprays into both nostrils 2 times daily as needed   Quantity:  30 mL   Refills:  3       fluticasone 50 MCG/ACT spray   Commonly known as:  FLONASE   Used for:  Other chronic rhinitis   Started by:  Lars Antony MD        Dose:  2 spray   Spray 2 sprays into both nostrils daily   Quantity:  1 Bottle   Refills:  3       olopatadine HCl 0.2 % Soln   Commonly known as:  PATADAY   Used for:  Chronic allergic conjunctivitis   Started " by:  Lars Antony MD        Dose:  1 drop   Place 1 drop into both eyes daily as needed   Quantity:  2.5 mL   Refills:  3            Where to get your medicines      These medications were sent to [facility] - [address] [address]     Phone:  [phone]     azelastine 0.1 % spray    fluticasone 50 MCG/ACT spray    olopatadine HCl 0.2 % Soln                Primary Care Provider Office Phone # Fax #    Kristen Laurent Burton, APRN [facility] [phone]       [address] [facility] [address]        Equal Access to Services     [facility] COLIN : Hadii betsy mo hadasho Soiván, waaxda luqadaha, qaybta kaalmada adecrystalyada, erika alva . So [facility] [phone].    ATENCIÓN: Si habla español, tiene a barrios disposición servicios gratuitos de asistencia lingüística. [facility] [phone].    We comply with applicable federal civil rights laws and [place] laws. We do not discriminate on the basis of race, color, national origin, age, disability sex, sexual orientation or gender identity.            Thank you!     Thank you for choosing [facility]  for your care. Our goal is always to provide you with excellent care. Hearing back from our patients is one way we can continue to improve our services. Please take a few minutes to complete the written survey that you may receive in the mail after your visit with us. Thank you!             Your Updated Medication List - Protect others around you: Learn how to safely use, store and throw away your medicines at www.disposemymeds.org.          This list is accurate as of: 8/18/17 11:26 AM.  Always use your most recent med list.                   Brand Name Dispense Instructions for use Diagnosis    albuterol 108 (90 BASE) MCG/ACT Inhaler    PROAIR HFA/PROVENTIL HFA/VENTOLIN HFA    1 Inhaler    Inhale 2 puffs into the lungs every 6 hours as needed for shortness  of breath / dyspnea or wheezing    Moderate persistent asthma without complication       azelastine 0.1 % spray    ASTELIN    30 mL    Spray 2 sprays into both nostrils 2 times daily as needed    Other chronic rhinitis       cetirizine 10 MG tablet    zyrTEC     Take 10 mg by mouth daily        ethynodiol-ethinyl estradiol 1-35 MG-MCG per tablet    KELNOR 1/35    84 tablet    Take 1 tablet by mouth daily 3 months on then off for one week    Encounter for initial prescription of contraceptive pills       fluticasone 100 MCG/BLIST Aepb    FLOVENT DISKUS    3 Inhaler    Inhale 1 puff into the lungs 2 times daily    Moderate persistent asthma without complication       fluticasone 50 MCG/ACT spray    FLONASE    1 Bottle    Spray 2 sprays into both nostrils daily    Other chronic rhinitis       olopatadine HCl 0.2 % Soln    PATADAY    2.5 mL    Place 1 drop into both eyes daily as needed    Chronic allergic conjunctivitis

## 2017-08-21 ENCOUNTER — RADIANT APPOINTMENT (OUTPATIENT)
Dept: GENERAL RADIOLOGY | Facility: CLINIC | Age: 27
End: 2017-08-21
Attending: ALLERGY & IMMUNOLOGY
Payer: COMMERCIAL

## 2017-08-21 DIAGNOSIS — J45.20 INTERMITTENT ASTHMA, UNCOMPLICATED: ICD-10-CM

## 2017-08-21 PROCEDURE — 71020 XR CHEST 2 VW: CPT

## 2017-08-24 ENCOUNTER — DOCUMENTATION ONLY (OUTPATIENT)
Dept: FAMILY MEDICINE | Facility: CLINIC | Age: 27
End: 2017-08-24

## 2017-08-24 DIAGNOSIS — I49.3 PVC'S (PREMATURE VENTRICULAR CONTRACTIONS): Primary | ICD-10-CM

## 2017-08-24 NOTE — PROGRESS NOTES
Holter monitor results discussed, showed symptoms correlated with premature ventricular contractions. Patient is still experiencing symptoms. Cardiology referral placed for further review and recommendations. All questions answered.      Pedro Luis Leavitt MD  [facility]

## 2017-08-25 ENCOUNTER — OFFICE VISIT (OUTPATIENT)
Dept: ALLERGY | Facility: CLINIC | Age: 27
End: 2017-08-25
Payer: COMMERCIAL

## 2017-08-25 VITALS — HEART RATE: 71 BPM | OXYGEN SATURATION: 96 % | SYSTOLIC BLOOD PRESSURE: 124 MMHG | DIASTOLIC BLOOD PRESSURE: 73 MMHG

## 2017-08-25 DIAGNOSIS — J30.0 CHRONIC VASOMOTOR RHINITIS: ICD-10-CM

## 2017-08-25 DIAGNOSIS — R06.2 WHEEZING: Primary | ICD-10-CM

## 2017-08-25 LAB
BASOPHILS # BLD AUTO: 0 10E9/L (ref 0–0.2)
BASOPHILS NFR BLD AUTO: 0.3 %
DIFFERENTIAL METHOD BLD: NORMAL
EOSINOPHIL # BLD AUTO: 0.3 10E9/L (ref 0–0.7)
EOSINOPHIL NFR BLD AUTO: 2.8 %
ERYTHROCYTE [DISTWIDTH] IN BLOOD BY AUTOMATED COUNT: 12.3 % (ref 10–15)
FEF 25/75: NORMAL
FEV-1: NORMAL
FEV1/FVC: NORMAL
FVC: NORMAL
HCT VFR BLD AUTO: 40.7 % (ref 35–47)
HGB BLD-MCNC: 13.8 G/DL (ref 11.7–15.7)
LYMPHOCYTES # BLD AUTO: 3.7 10E9/L (ref 0.8–5.3)
LYMPHOCYTES NFR BLD AUTO: 36.7 %
MCH RBC QN AUTO: 31.4 PG (ref 26.5–33)
MCHC RBC AUTO-ENTMCNC: 33.9 G/DL (ref 31.5–36.5)
MCV RBC AUTO: 93 FL (ref 78–100)
MONOCYTES # BLD AUTO: 0.6 10E9/L (ref 0–1.3)
MONOCYTES NFR BLD AUTO: 6.2 %
NEUTROPHILS # BLD AUTO: 5.4 10E9/L (ref 1.6–8.3)
NEUTROPHILS NFR BLD AUTO: 54 %
PLATELET # BLD AUTO: 246 10E9/L (ref 150–450)
RBC # BLD AUTO: 4.39 10E12/L (ref 3.8–5.2)
WBC # BLD AUTO: 10 10E9/L (ref 4–11)

## 2017-08-25 PROCEDURE — 82785 ASSAY OF IGE: CPT | Performed by: ALLERGY & IMMUNOLOGY

## 2017-08-25 PROCEDURE — 85025 COMPLETE CBC W/AUTO DIFF WBC: CPT | Performed by: ALLERGY & IMMUNOLOGY

## 2017-08-25 PROCEDURE — 99214 OFFICE O/P EST MOD 30 MIN: CPT | Mod: 25 | Performed by: ALLERGY & IMMUNOLOGY

## 2017-08-25 PROCEDURE — 99000 SPECIMEN HANDLING OFFICE-LAB: CPT | Performed by: ALLERGY & IMMUNOLOGY

## 2017-08-25 PROCEDURE — 36415 COLL VENOUS BLD VENIPUNCTURE: CPT | Performed by: ALLERGY & IMMUNOLOGY

## 2017-08-25 PROCEDURE — 94060 EVALUATION OF WHEEZING: CPT | Performed by: ALLERGY & IMMUNOLOGY

## 2017-08-25 PROCEDURE — 86003 ALLG SPEC IGE CRUDE XTRC EA: CPT | Performed by: ALLERGY & IMMUNOLOGY

## 2017-08-25 ASSESSMENT — ENCOUNTER SYMPTOMS
ADENOPATHY: 0
FEVER: 0
RHINORRHEA: 1
COUGH: 1
FACIAL SWELLING: 0
ACTIVITY CHANGE: 0
SHORTNESS OF BREATH: 0
CHILLS: 0
CHEST TIGHTNESS: 1
EYE REDNESS: 1
MYALGIAS: 0
WHEEZING: 0
DIARRHEA: 0
VOMITING: 0
HEADACHES: 0
EYE ITCHING: 1
SINUS PRESSURE: 1
NAUSEA: 0
ARTHRALGIAS: 0
EYE DISCHARGE: 1

## 2017-08-25 NOTE — PROGRESS NOTES
SUBJECTIVE:                                                                   Fatimah Roy presents today to our Allergy Clinic at [facility] for a follow up visit and skin testing.    As you know, she is a 27-year-old female with chronic rhinitis and historical diagnosis of asthma.  Holter monitor showed signs correlated with premature ventricular contractions. The patient will be seen by Cardiology on .  She tried intranasal fluticasone and thought that it was helpful. She stopped it 3 days ago, thinking that it was required for allergy skin testing. She used azelastine several times which caused some sneezing. She stopped it along with cetirizine. The patient feels that for the last week, since she has been off cetirizine, her asthma symptoms got worse. She has been using the albuterol inhaler pretty much on a daily basis. Last night, she woke up with some chest tightness and had to use albuterol, which helped.  She is also complaining of nasal congestion, rhinorrhea, sneezing and itchy/watery eyes.    She had normal chest x-ray on .      Patient Active Problem List   Diagnosis     CARDIOVASCULAR SCREENING; LDL GOAL LESS THAN 160     Intermittent asthma     Seasonal allergic rhinitis     Chronic allergic conjunctivitis     Prenatal care, first pregnancy     S/P        Past Medical History:   Diagnosis Date     Chickenpox      Uncomplicated asthma       Problem (# of Occurrences) Relation (Name,Age of Onset)    Allergy (Severe) (1) Mother: Bee Venom and Fire Ant    CANCER (1) Mother: cervical     Depression (1) Father    GASTROINTESTINAL DISEASE (1) Mother: diverticulitis    HEART DISEASE (2) Maternal Grandmother: MI, Paternal Grandfather: MI    Hepatitis (1) Father    Hypertension (1) Mother    Substance Abuse (1) Father: recovered alcohol       Negative family history of: C.A.D., DIABETES, CEREBROVASCULAR DISEASE, Breast Cancer, Cancer - colorectal        Past  Surgical History:   Procedure Laterality Date      SECTION N/A 3/24/2017    Procedure:  SECTION;  Surgeon: Renetta Saeed MD;  Location: [facility] OR     ESOPHAGOSCOPY, GASTROSCOPY, DUODENOSCOPY (EGD), COMBINED  2012    Procedure: COMBINED ESOPHAGOSCOPY, GASTROSCOPY, DUODENOSCOPY (EGD);  Gastroscopy;  Surgeon: Peet Arciniega MD;  Location: [facility] GI     MOUTH SURGERY      wisdom teeth     Social History     Social History     Marital status:      Spouse name: N/A     Number of children: N/A     Years of education: N/A     Social History Main Topics     Smoking status: Never Smoker     Smokeless tobacco: Never Used     Alcohol use 0.0 oz/week     0 Standard drinks or equivalent per week      Comment: rare- quit with pregnancy     Drug use: No     Sexual activity: No     Other Topics Concern     None     Social History Narrative    2017        ENVIRONMENTAL HISTORY: [place] The [place] is heated with a forced air and gas furnace. They does have central air conditioning. The patient's bedroom is furnished with feather/wool bedding or pillows, carpeting in bedroom and fabric window coverings.  Pets inside the house include 1 cat and 1 dog. There is not history of cockroach or mice infestation. There are no smokers in the house.  The house does not have a damp basement.                Review of Systems   Constitutional: Negative for activity change, chills and fever.   HENT: Positive for congestion, rhinorrhea, sinus pressure and sneezing. Negative for ear discharge, facial swelling, nosebleeds and postnasal drip.    Eyes: Positive for discharge, redness and itching.   Respiratory: Positive for cough and chest tightness. Negative for shortness of breath and wheezing.    Cardiovascular: Negative for chest pain.   Gastrointestinal: Negative for diarrhea, nausea and vomiting.   Musculoskeletal: Negative for arthralgias and myalgias.   Skin:  Negative for rash.   Neurological: Negative for headaches.   Hematological: Negative for adenopathy.   Psychiatric/Behavioral: Negative for behavioral problems and self-injury.           Current Outpatient Prescriptions:      fluticasone-vilanterol (BREO ELLIPTA) 100-25 MCG/INH oral inhaler, Inhale 1 puff into the lungs daily, Disp: 1 Inhaler, Rfl: 1     fluticasone (FLONASE) 50 MCG/ACT spray, Spray 2 sprays into both nostrils daily, Disp: 1 Bottle, Rfl: 3     albuterol (PROAIR HFA/PROVENTIL HFA/VENTOLIN HFA) 108 (90 BASE) MCG/ACT Inhaler, Inhale 2 puffs into the lungs every 6 hours as needed for shortness of breath / dyspnea or wheezing, Disp: 1 Inhaler, Rfl: 1     fluticasone (FLOVENT DISKUS) 100 MCG/BLIST AEPB, Inhale 1 puff into the lungs 2 times daily, Disp: 3 Inhaler, Rfl: 1     ethynodiol-ethinyl estradiol (KELNOR 1/35) 1-35 MG-MCG per tablet, Take 1 tablet by mouth daily 3 months on then off for one week, Disp: 84 tablet, Rfl: 3     cetirizine (ZYRTEC) 10 MG tablet, Take 10 mg by mouth daily, Disp: , Rfl:      azelastine (ASTELIN) 0.1 % spray, Spray 2 sprays into both nostrils 2 times daily as needed (Patient not taking: Reported on 8/25/2017), Disp: 30 mL, Rfl: 3     olopatadine HCl (PATADAY) 0.2 % SOLN, Place 1 drop into both eyes daily as needed (Patient not taking: Reported on 8/25/2017), Disp: 2.5 mL, Rfl: 3  Immunization History   Administered Date(s) Administered     HPVQuadrivalent 08/18/2011     Influenza (IIV3) 11/09/2007     Influenza Vaccine IM 3yrs+ 4 Valent IIV4 10/17/2016     Meningococcal (Menactra ) 08/18/2011     TDAP Vaccine (Adacel) 09/14/2009, 01/24/2017     Allergies   Allergen Reactions     Nkda [No Known Drug Allergies]      OBJECTIVE:                                                                 /73 (BP Location: Left arm, Patient Position: Chair, Cuff Size: Adult Regular)  Pulse 71  SpO2 96%        Physical Exam   Constitutional: She is oriented to person, place, and time.  No distress.   HENT:   Head: Normocephalic and atraumatic.   Right Ear: Tympanic membrane, external ear and ear canal normal.   Left Ear: Tympanic membrane, external ear and ear canal normal.   Nose: Mucosal edema (mild) and rhinorrhea present.   Mouth/Throat: Oropharynx is clear and moist and mucous membranes are normal.   Eyes: Conjunctivae are normal. Right eye exhibits discharge (scant, clear). Left eye exhibits discharge (scant, clear).   Neck: Normal range of motion. Neck supple.   Cardiovascular: Normal rate, regular rhythm and normal heart sounds.    No murmur heard.  Pulmonary/Chest: Effort normal. No respiratory distress. She has wheezes (end-expiratory, bilaterally, posteriorly). She has no rales.   Musculoskeletal: Normal range of motion.   Neurological: She is alert and oriented to person, place, and time.   Skin: Skin is warm. She is not diaphoretic.   Psychiatric: Affect normal.   Nursing note and vitals reviewed.            WORKUP:     SPIROMETRY  initial FVC 3.66L (94% of predicted); after bronchodilator 3.84L (4% change)   initial FEV1 3.04L (92% of predicted); after bronchodilator 3.29L (8% change)  initial FEV1/FVC 83 %; after bronchodilator 86%  initial FEF 25%-75% 2.97L/s (80% of predicted); after bronchodilator 4.18L/s (40% change)    The office spirometry performed today does not suggest an obstruction. However, there is mild reversibility after nebulized albuterol in FEF 25-75 percent. The patient verbalized clinical improvement in breathing.    ASSESSMENT/PLAN:       Visit Diagnoses     1. Wheezing    -  Primary  On auscultation, the wheezing resolved after one nebulized albuterol treatment.  She has been symptomatic while on Flovent 100 mcg one puff twice a day.  -We agreed to stop Flovent for now, and start Breo Ellipta 100/25 mcg 1 puff once a day. We will consider stepdown therapy once she is better controlled.  -Also ordered CBC with differential (eosinophilia?), and total serum  IgE.  -Continue using albuterol inhaler 2-4 puffs every 4 hours as needed for persistent cough/chest tightness/wheezing/shortness of breath.  Asked the patient to use albuterol with chamber device (provided and reviewed how to use it).    Relevant Medications    fluticasone-vilanterol (BREO ELLIPTA) 100-25 MCG/INH oral inhaler    Other Relevant Orders    BRONCHODILATION RESPONSE, PRE/POST ADMIN (Completed)    ALBUTEROL UNIT DOSE, 1 MG (Completed)    OPTICHAMBER (Completed)    CBC with platelets differential (Completed)    IgE (Completed)    2. Chronic vasomotor rhinitis      Considering her active chest symptoms, we decided not to perform percutaneous skin puncture testing for aeroallergens today.  -Ordered serum IgE for regional aeroallergen panel.  -Restart cetirizine.  -We will restart intranasal fluticasone and azelastine.  -For her eye symptoms, she may use Pataday if cetirizine, intranasal fluticasone and azelastine are not helpful.    Relevant Medications    fluticasone-vilanterol (BREO ELLIPTA) 100-25 MCG/INH oral inhaler    Other Relevant Orders    Allergen cat epithellium IgE (Completed)    Allergen dog epithelium IgE (Completed)    Allergen Itz grass IgE (Completed)    Allergen orchard grass IgE (Completed)    Allergen snehal IgE (Completed)    Allergen D pteronyssinus IgE (Completed)    Allergen D farinae IgE (Completed)    Allergen alternaria alternata IgE (Completed)    Allergen aspergillus fumigatus IgE (Completed)    Allergen cladosporium herbarum IgE (Completed)    Allergen Epicoccum purpurascens IgE (Completed)    Allergen penicillium notatum IgE (Completed)    Allergen Red [place] IgE (Completed)    Allergen silver  birch IgE (Completed)    Allergen Tree White [place] IgE (Completed)    Allergen [place] Tree (Completed)    Allergen white pine IgE (Completed)    Allergen oak white IgE (Completed)    Allergen maple box elder IgE (Completed)    Allergen elm IgE (Completed)    Allergen  cottonwood IgE (Completed)    Allergen [place] IgE (Completed)    Allergen faith white IgE (Completed)    Allergen English plantain IgE (Completed)    Allergen giant ragweed IgE (Completed)    Allergen lamb's quarter IgE (Completed)    Allergen Mugwort IgE (Completed)    Allergen ragweed short IgE (Completed)    Allergen, Kochia/Firebush (Completed)    Allergen Weed Nettle IgE (Completed)    Allergen thistle Russian IgE (Completed)    Allergen Sheep Sorrel IgE (Completed)    Allergen Sagebrush Wormwood IgE (Completed)            Follow up in 4 weeks or sooner if needed.    Thank you for allowing us to participate in the care of this patient. Please feel free to contact us if there are any questions or concerns about the patient.    Disclaimer: This note consists of symbols derived from keyboarding, dictation and/or voice recognition software. As a result, there may be errors in the script that have gone undetected. Please consider this when interpreting information found in this chart.    Lars Antony MD, [facility]  Allergy, Asthma and Immunology  [place], [place] and [place]

## 2017-08-25 NOTE — MR AVS SNAPSHOT
After Visit Summary   8/25/2017    Fatimah Roy    MRN: [id number]           Patient Information     Date Of Birth          1990        Visit Information        Provider Department      8/25/2017 10:00 AM Lars Antony MD [facility]        Today's Diagnoses     Wheezing    -  1    Chronic vasomotor rhinitis          Care Instructions    Restart nasal sprays.   Restart cetirizine.  Stop Flovent for now.  Start Breo Ellipta 100/25 mcg 1 puff once a day  -Continue using albuterol inhaler 2-4 puffs every 4 hours as needed for chest tightness/wheezing/shortness of breath/persistent cough.  -Use albuterol with chamber device.            Follow-ups after your visit        Follow-up notes from your care team     Return in about 4 weeks (around 9/22/2017) for rhinitis follow up, asthma follow up.      Your next 10 appointments already scheduled     Sep 06, 2017  1:00 PM CDT   New Visit with Kacey Robbins MD   [facility] PHYSICIAN HEART AT [facility] ([facility])    [address] [facility] [address] [phone]              Who to contact     If you have questions or need follow up information about today's clinic visit or your schedule please contact [facility] directly at [phone].  Normal or non-critical lab and imaging results will be communicated to you by MyChart, letter or phone within 4 business days after the clinic has received the results. If you do not hear from us within 7 days, please contact the clinic through MyChart or phone. If you have a critical or abnormal lab result, we will notify you by phone as soon as possible.  Submit refill requests through [employer] or call your pharmacy and they will forward the refill request to us. Please allow 3 business days for your refill to be completed.          Additional Information About Your Visit        [employer] Information     [employer] gives you secure access to your  electronic health record. If you see a primary care provider, you can also send messages to your care team and make appointments. If you have questions, please call your primary care clinic.  If you do not have a primary care provider, please call [phone] and they will assist you.        Care EveryWhere ID     This is your Care EveryWhere ID. This could be used by other organizations to access your [place] medical records  [id number]        Your Vitals Were     Pulse Pulse Oximetry                71 96%           Blood Pressure from Last 3 Encounters:   08/25/17 124/73   08/18/17 112/80   08/09/17 114/72    Weight from Last 3 Encounters:   08/18/17 165 lb 9.1 oz (75.1 kg)   05/05/17 156 lb (70.8 kg)   03/24/17 165 lb 9.6 oz (75.1 kg)              We Performed the Following     ALBUTEROL UNIT DOSE, 1 MG     Allergen alternaria alternata IgE     Allergen faith white IgE     Allergen aspergillus fumigatus IgE     Allergen cat epithellium IgE     Allergen Cedar IgE     Allergen cladosporium herbarum IgE     Allergen cottonwood IgE     Allergen D farinae IgE     Allergen D pteronyssinus IgE     Allergen dog epithelium IgE     Allergen elm IgE     Allergen English plantain IgE     Allergen Epicoccum purpurascens IgE     Allergen giant ragweed IgE     Allergen Itz grass IgE     Allergen lamb's quarter IgE     Allergen maple box elder IgE     Allergen Mugwort IgE     Allergen oak white IgE     Allergen orchard grass IgE     Allergen penicillium notatum IgE     Allergen ragweed short IgE     Allergen Red [place] IgE     Allergen Sagebrush Wormwood IgE     Allergen Sheep Sorrel IgE     Allergen silver  birch IgE     Allergen thistle Russian IgE     Allergen snehal IgE     Allergen Tree White [place] IgE     Allergen Lanark Tree     Allergen Weed Nettle IgE     Allergen white pine IgE     Allergen, Kochia/Firebush     BRONCHODILATION RESPONSE, PRE/POST ADMIN     CBC with platelets differential     IgE      MILES          Today's Medication Changes          These changes are accurate as of: 8/25/17 12:13 PM.  If you have any questions, ask your nurse or doctor.               Start taking these medicines.        Dose/Directions    fluticasone-vilanterol 100-25 MCG/INH oral inhaler   Commonly known as:  BREO ELLIPTA   Used for:  Wheezing   Started by:  Lars Antony MD        Dose:  1 puff   Inhale 1 puff into the lungs daily   Quantity:  1 Inhaler   Refills:  1            Where to get your medicines      These medications were sent to [facility] [address] [address]     Phone:  [phone]     fluticasone-vilanterol 100-25 MCG/INH oral inhaler                Primary Care Provider Office Phone # Fax #    Kristen Laurent HECTOR Manrique [facility] [phone]       [address] [facility] [address]        Equal Access to Services     JE SHAW : Hadii aad ku hadasho Soiván, waaxda luqadaha, qaybta kaalmada adeegyada, erika alva . So [facility] [phone].    ATENCIÓN: Si habla español, tiene a barrios disposición servicios gratuitos de asistencia lingüística. Llame al [phone].    We comply with applicable federal civil rights laws and [place] laws. We do not discriminate on the basis of race, color, national origin, age, disability sex, sexual orientation or gender identity.            Thank you!     Thank you for choosing [facility]  for your care. Our goal is always to provide you with excellent care. Hearing back from our patients is one way we can continue to improve our services. Please take a few minutes to complete the written survey that you may receive in the mail after your visit with us. Thank you!             Your Updated Medication List - Protect others around you: Learn how to safely use, store and throw away your medicines at www.disposemymeds.org.          This list is  accurate as of: 8/25/17 12:13 PM.  Always use your most recent med list.                   Brand Name Dispense Instructions for use Diagnosis    albuterol 108 (90 BASE) MCG/ACT Inhaler    PROAIR HFA/PROVENTIL HFA/VENTOLIN HFA    1 Inhaler    Inhale 2 puffs into the lungs every 6 hours as needed for shortness of breath / dyspnea or wheezing    Moderate persistent asthma without complication       azelastine 0.1 % spray    ASTELIN    30 mL    Spray 2 sprays into both nostrils 2 times daily as needed    Other chronic rhinitis       cetirizine 10 MG tablet    zyrTEC     Take 10 mg by mouth daily        ethynodiol-ethinyl estradiol 1-35 MG-MCG per tablet    KELNOR 1/35    84 tablet    Take 1 tablet by mouth daily 3 months on then off for one week    Encounter for initial prescription of contraceptive pills       fluticasone 100 MCG/BLIST Aepb    FLOVENT DISKUS    3 Inhaler    Inhale 1 puff into the lungs 2 times daily    Moderate persistent asthma without complication       fluticasone 50 MCG/ACT spray    FLONASE    1 Bottle    Spray 2 sprays into both nostrils daily    Other chronic rhinitis       fluticasone-vilanterol 100-25 MCG/INH oral inhaler    BREO ELLIPTA    1 Inhaler    Inhale 1 puff into the lungs daily    Wheezing       olopatadine HCl 0.2 % Soln    PATADAY    2.5 mL    Place 1 drop into both eyes daily as needed    Chronic allergic conjunctivitis

## 2017-08-25 NOTE — Clinical Note
Dear Dr. Leavitt  The patient was seen in Allergy Clinic for a follow up visit. Please see the office visit note for more details. Thank you!

## 2017-08-25 NOTE — PATIENT INSTRUCTIONS
Restart nasal sprays.   Restart cetirizine.  Stop Flovent for now.  Start Breo Ellipta 100/25 mcg 1 puff once a day  -Continue using albuterol inhaler 2-4 puffs every 4 hours as needed for chest tightness/wheezing/shortness of breath/persistent cough.  -Use albuterol with chamber device.

## 2017-08-25 NOTE — NURSING NOTE
"Chief Complaint   Patient presents with     Allergy Testing Followup     Skin Testing- Environmental Aeroallergen Adult Panel       Initial /73 (BP Location: Left arm, Patient Position: Chair, Cuff Size: Adult Regular)  Pulse 71  SpO2 96% Estimated body mass index is 27.92 kg/(m^2) as calculated from the following:    Height as of 8/18/17: 5' 4.57\" (1.64 m).    Weight as of 8/18/17: 165 lb 9.1 oz (75.1 kg).  Medication Reconciliation: complete    "

## 2017-08-25 NOTE — NURSING NOTE
Writer demonstrated how to use an MDI inhaler with a spacer for patient.  Patient instructed to shake the inhaler for 5 seconds, empty the lungs by exhaling away from inhaler, put the inhaler + spacer in her mouth, push down on the inhaler and breathe in at the same time and then hold breath for 10 seconds.  RN informed patient that if an audible whistle is heard from spacer, she needs to inhale more slowly.  Patient advised to wait 1-2 minutes between puffs.  Patient instructed on how to clean the MDI inhaler, take the medication canister out, wash it in warm soapy water, rinse it and then let it dry over night.  RN advised patient to refer to handout with spacer for cleaning instructions.  Patient informed the inhaler needs to be washed once a week or when he notices a powder buildup.  Patient verbalized understanding.     Instructed patient on how to use Breo inhaler. Check dose counter. (Do not shake at any time.) Slide cover down until a click is heard. Breathe out gently (away from inhaler) Put mouthpiece in mouth and close lips to form a good seal. (Do not block air vent with fingers.) Breathe in steadily and deeply. Hold breath for 5 seconds or as long as comfortable. While holding breath, remove inhaler from mouth. Breathe out gently (away from inhaler) Slide the cover upwards as far as it will go, to cover the mouthpiece.   The patient states understanding.    Mara Montalvo RN

## 2017-08-25 NOTE — NURSING NOTE
The following medication was given:     MEDICATION: Albuterol Sulfate 0.083%  ROUTE: Inhale  SITE: mouth  DOSE: 2.5 mg/3 mL  LOT #: [id number]  :  [employer]  EXPIRATION DATE:  2/2019  NDC#: 9479-1202-69

## 2017-08-27 LAB
A ALTERNATA IGE QN: <0.1 KU(A)/L
A FUMIGATUS IGE QN: <0.1 KU(A)/L
C HERBARUM IGE QN: <0.1 KU(A)/L
CAT DANDER IGG QN: 5.01 KU(A)/L
CEDAR IGE QN: <0.1 KU(A)/L
COCKSFOOT IGE QN: <0.1 KU(A)/L
COMMON RAGWEED IGE QN: <0.1 KU(A)/L
COTTONWOOD IGE QN: <0.1 KU(A)/L
D FARINAE IGE QN: <0.1 KU(A)/L
D PTERONYSS IGE QN: <0.1 KU(A)/L
DOG DANDER+EPITH IGE QN: 8.44 KU(A)/L
E PURPURASCENS IGE QN: <0.1 KU(A)/L
EAST WHITE PINE IGE QN: <0.1 KU(A)/L
ENGL PLANTAIN IGE QN: <0.1 KU(A)/L
FIREBUSH IGE QN: <0.1 KU(A)/L
GIANT RAGWEED IGE QN: <0.1 KU(A)/L
GOOSEFOOT IGE QN: <0.1 KU(A)/L
IGE SERPL-ACNC: 126 KIU/L (ref 0–114)
JOHNSON GRASS IGE QN: <0.1 KU(A)/L
MAPLE IGE QN: <0.1 KU(A)/L
MUGWORT IGE QN: <0.1 KU(A)/L
P NOTATUM IGE QN: <0.1 KU(A)/L
RED MULBERRY IGE QN: <0.1 KU(A)/L
SALTWORT IGE QN: <0.1 KU(A)/L
SHEEP SORREL IGE QN: <0.1 KU(A)/L
SILVER BIRCH IGE QN: 0.73 KU(A)/L
TIMOTHY IGE QN: <0.1 KU(A)/L
WHITE ASH IGE QN: <0.1 KU(A)/L
WHITE ELM IGE QN: 0.12 KU(A)/L
WHITE MULBERRY IGE QN: <0.1
WHITE OAK IGE QN: 0.12 KU(A)/L
WORMWOOD IGE QN: <0.1 KU(A)/L

## 2017-08-28 LAB — NETTLE IGE QN: <0.1 KU(A)/L

## 2017-08-29 LAB
CALIF WALNUT IGE QN: 0.11 KU/L
DEPRECATED MISC ALLERGEN IGE RAST QL: NORMAL

## 2017-09-01 NOTE — PROGRESS NOTES
Positive serum IgE for dog and cat.  Slightly positive IgE for pollen of several trees, with unclear clinical relevance.  -Please discuss avoidance measures.  -No changes in recommendation to use intranasal fluticasone and azelastine.  Allergen immunotherapy would be an option unless she is started on a beta blocker or chest symptoms are not well controlled.

## 2017-09-06 ENCOUNTER — OFFICE VISIT (OUTPATIENT)
Dept: CARDIOLOGY | Facility: CLINIC | Age: 27
End: 2017-09-06
Attending: FAMILY MEDICINE
Payer: COMMERCIAL

## 2017-09-06 VITALS
OXYGEN SATURATION: 96 % | WEIGHT: 164.4 LBS | BODY MASS INDEX: 27.73 KG/M2 | DIASTOLIC BLOOD PRESSURE: 75 MMHG | HEART RATE: 91 BPM | SYSTOLIC BLOOD PRESSURE: 116 MMHG

## 2017-09-06 DIAGNOSIS — R00.2 PALPITATIONS: Primary | ICD-10-CM

## 2017-09-06 PROCEDURE — 99203 OFFICE O/P NEW LOW 30 MIN: CPT | Performed by: INTERNAL MEDICINE

## 2017-09-06 NOTE — MR AVS SNAPSHOT
After Visit Summary   9/6/2017    Fatimah Roy    MRN: [id number]           Patient Information     Date Of Birth          1990        Visit Information        Provider Department      9/6/2017 1:00 PM March, Kacey Rock MD [facility] PHYSICIAN HEART AT [facility]        Today's Diagnoses     Palpitations    -  1       Follow-ups after your visit        Who to contact     If you have questions or need follow up information about today's clinic visit or your schedule please contact [facility] PHYSICIAN HEART AT [facility] directly at [phone].  Normal or non-critical lab and imaging results will be communicated to you by [employer], letter or phone within 4 business days after the clinic has received the results. If you do not hear from us within 7 days, please contact the clinic through [employer] or phone. If you have a critical or abnormal lab result, we will notify you by phone as soon as possible.  Submit refill requests through [employer] or call your pharmacy and they will forward the refill request to us. Please allow 3 business days for your refill to be completed.          Additional Information About Your Visit        MyChart Information     [employer] gives you secure access to your electronic health record. If you see a primary care provider, you can also send messages to your care team and make appointments. If you have questions, please call your primary care clinic.  If you do not have a primary care provider, please call [phone] and they will assist you.        Care EveryWhere ID     This is your Care EveryWhere ID. This could be used by other organizations to access your [place] medical records  [id number]        Your Vitals Were     Pulse Pulse Oximetry BMI (Body Mass Index)             91 96% 27.73 kg/m2          Blood Pressure from Last 3 Encounters:   09/06/17 116/75   08/25/17 124/73   08/18/17 112/80    Weight from Last 3  Encounters:   09/06/17 74.6 kg (164 lb 6.4 oz)   08/18/17 75.1 kg (165 lb 9.1 oz)   05/05/17 70.8 kg (156 lb)              Today, you had the following     No orders found for display         Today's Medication Changes          These changes are accurate as of: 9/6/17  1:33 PM.  If you have any questions, ask your nurse or doctor.               Stop taking these medicines if you haven't already. Please contact your care team if you have questions.     fluticasone 100 MCG/BLIST Aepb   Commonly known as:  FLOVENT DISKUS   Stopped by:  Kacey Robbins MD                    Primary Care Provider Office Phone # Fax #    Kristen Laurent Burton, APRN [facility] [phone]       [address] [place] [id number]        Equal Access to Services     [facility] : Diana Blackwell, waaxda luqadaha, qaybta kaalmakennedy calloway, erika alva . So [facility] [phone].    ATENCIÓN: Si habla español, tiene a barrios disposición servicios gratuitos de asistencia lingüística. Yonas al [phone].    We comply with applicable federal civil rights laws and [place] laws. We do not discriminate on the basis of race, color, national origin, age, disability sex, sexual orientation or gender identity.            Thank you!     Thank you for choosing [facility] PHYSICIAN HEART AT [facility]  for your care. Our goal is always to provide you with excellent care. Hearing back from our patients is one way we can continue to improve our services. Please take a few minutes to complete the written survey that you may receive in the mail after your visit with us. Thank you!             Your Updated Medication List - Protect others around you: Learn how to safely use, store and throw away your medicines at www.disposemymeds.org.          This list is accurate as of: 9/6/17  1:33 PM.  Always use your most recent med list.                   Brand Name Dispense Instructions for use  Diagnosis    albuterol 108 (90 BASE) MCG/ACT Inhaler    PROAIR HFA/PROVENTIL HFA/VENTOLIN HFA    1 Inhaler    Inhale 2 puffs into the lungs every 6 hours as needed for shortness of breath / dyspnea or wheezing    Moderate persistent asthma without complication       cetirizine 10 MG tablet    zyrTEC     Take 10 mg by mouth daily        ethynodiol-ethinyl estradiol 1-35 MG-MCG per tablet    KELNOR 1/35    84 tablet    Take 1 tablet by mouth daily 3 months on then off for one week    Encounter for initial prescription of contraceptive pills       fluticasone-vilanterol 100-25 MCG/INH oral inhaler    BREO ELLIPTA    1 Inhaler    Inhale 1 puff into the lungs daily    Wheezing

## 2017-09-06 NOTE — PROGRESS NOTES
HPI:     Please see dictated note    PAST MEDICAL HISTORY:  Past Medical History:   Diagnosis Date     Chickenpox      Uncomplicated asthma        CURRENT MEDICATIONS:  Current Outpatient Prescriptions   Medication Sig Dispense Refill     fluticasone-vilanterol (BREO ELLIPTA) 100-25 MCG/INH oral inhaler Inhale 1 puff into the lungs daily 1 Inhaler 1     cetirizine (ZYRTEC) 10 MG tablet Take 10 mg by mouth daily       albuterol (PROAIR HFA/PROVENTIL HFA/VENTOLIN HFA) 108 (90 BASE) MCG/ACT Inhaler Inhale 2 puffs into the lungs every 6 hours as needed for shortness of breath / dyspnea or wheezing 1 Inhaler 1     ethynodiol-ethinyl estradiol (KELNOR 35) 1-35 MG-MCG per tablet Take 1 tablet by mouth daily 3 months on then off for one week 84 tablet 3       PAST SURGICAL HISTORY:  Past Surgical History:   Procedure Laterality Date      SECTION N/A 3/24/2017    Procedure:  SECTION;  Surgeon: Renetta Saeed MD;  Location: [facility] OR     ESOPHAGOSCOPY, GASTROSCOPY, DUODENOSCOPY (EGD), COMBINED  2012    Procedure: COMBINED ESOPHAGOSCOPY, GASTROSCOPY, DUODENOSCOPY (EGD);  Gastroscopy;  Surgeon: Pete Arciniega MD;  Location: [facility] GI     MOUTH SURGERY      wisdom teeth       ALLERGIES     Allergies   Allergen Reactions     Nkda [No Known Drug Allergies]        FAMILY HISTORY:  Family History   Problem Relation Age of Onset     CANCER Mother      cervical      Hypertension Mother      GASTROINTESTINAL DISEASE Mother      diverticulitis     Allergy (Severe) Mother      Bee Venom and Fire Ant     HEART DISEASE Maternal Grandmother      MI     HEART DISEASE Paternal Grandfather      MI     Hepatitis Father      Substance Abuse Father      recovered alcohol     Depression Father      C.A.D. No family hx of      DIABETES No family hx of      CEREBROVASCULAR DISEASE No family hx of      Breast Cancer No family hx of      Cancer - colorectal No family hx of        SOCIAL HISTORY:  Social History     Social  History     Marital status:      Spouse name: N/A     Number of children: N/A     Years of education: N/A     Social History Main Topics     Smoking status: Never Smoker     Smokeless tobacco: Never Used     Alcohol use 0.0 oz/week     0 Standard drinks or equivalent per week      Comment: rare- quit with pregnancy     Drug use: No     Sexual activity: No     Other Topics Concern     None     Social History Narrative    August 18, 2017        ENVIRONMENTAL HISTORY: [place] The home is heated with a forced air and gas furnace. They does have central air conditioning. The patient's bedroom is furnished with feather/wool bedding or pillows, carpeting in bedroom and fabric window coverings.  Pets inside the house include 1 cat and 1 dog. There is not history of cockroach or mice infestation. There are no smokers in the house.  The house does not have a damp basement.            ROS:   Constitutional: No fever, chills, or sweats. No weight gain/loss   ENT: No visual disturbance, ear ache, epistaxis, sore throat  Allergies/Immunologic: Negative.   Respiratory: No cough, hemoptysia  Cardiovascular: As per HPI  GI: No nausea, vomiting, hematemesis, melena, or hematochezia  : No urinary frequency, dysuria, or hematuria  Integument: Negative  Psychiatric: Negative  Neuro: Negative  Endocrinology: Negative   Musculoskeletal: Negative    EXAM:  /75 (BP Location: Right arm, Patient Position: Sitting, Cuff Size: Adult Regular)  Pulse 91  Wt 74.6 kg (164 lb 6.4 oz)  SpO2 96%  BMI 27.73 kg/m2  In general, the patient is a pleasant female in no apparent distress.    HEENT: NC/AT.  PERRLA.  EOMI.  Sclerae white, not injected.  Nares clear.  Pharynx without erythema or exudate.  Dentition intact.    Neck: No adenopathy.  No thyromegaly. Carotids +4/4 bilaterally without bruits.  No jugular venous distension.   Heart: RRR. Normal S1, S2 splits physiologically. No murmur,  rub, click, or gallop. The PMI is in the 5th ICS in the midclavicular line. There is no heave.    Lungs: CTA.  No ronchi, wheezes, rales.  No dullness to percussion.   Abdomen: Soft, nontender, nondistended. No organomegaly.  No bruits.   Extremities: No clubbing, cyanosis, or edema.  The pulses are +4/4 at the radial, brachial, femoral, popliteal, DP, and PT sites bilaterally.  No bruits are noted.  Neurologic: Alert and oriented to person/place/time, normal speech, gait and affect  Skin: No petechiae, purpura or rash.    Labs:  LIPID RESULTS:  Lab Results   Component Value Date    CHOL 216 (H) 02/27/2014    HDL 66 02/27/2014     (H) 02/27/2014    TRIG 75 02/27/2014    CHOLHDLRATIO 3.0 02/27/2014       LIVER ENZYME RESULTS:  Lab Results   Component Value Date    AST 24 07/24/2012    ALT 12 07/24/2012       CBC RESULTS:  Lab Results   Component Value Date    WBC 10.0 08/25/2017    RBC 4.39 08/25/2017    HGB 13.8 08/25/2017    HCT 40.7 08/25/2017    MCV 93 08/25/2017    MCH 31.4 08/25/2017    MCHC 33.9 08/25/2017    RDW 12.3 08/25/2017     08/25/2017       BMP RESULTS:  Lab Results   Component Value Date     08/09/2017    POTASSIUM 3.7 08/09/2017    CHLORIDE 102 08/09/2017    CO2 27 08/09/2017    ANIONGAP 6 08/09/2017    GLC 75 08/09/2017    BUN 12 08/09/2017    CR 0.64 08/09/2017    GFRESTIMATED >90  Non  GFR Calc   08/09/2017    GFRESTBLACK >90   GFR Calc   08/09/2017    MARGRET 9.4 08/09/2017      KRIS Robbins MD [facility]      CC  Patient Care Team:  Kristen Manrique APRN CNP as PCP - General (Family Practice)  ANANYA VAUGHN

## 2017-09-06 NOTE — LETTER
9/6/2017    Pedro Luis Leavitt MD  [address] [place] [address]    RE: Fatimah LOBO Roy       Dear Colleague,    I had the pleasure of seeing Fatimah Roy in the [facility].    Ms. Roy is a pleasant 27-year-old woman who is a  who has a past medical history significant for allergies and asthma.  She is being seen today for symptoms of PVCs.      She reports that in the beginning August, she began to note symptoms of a big heavy beat and maybe a pause.  She went to see her primary physician who ordered all 48-hour Holter that was done on 08/23/2017 and she had 1700 PVCs with this and her symptoms did correlate with the PVCs.  She has not had any other concerning symptoms.  She is back to work and feeling well.        Her PVCs are very characteristic.  She will have a very strong beat and then what feels like a pause and then go back to her normal beats.  She has occasional lightheadedness, but has had no swelling or lower extremity edema.  There is no family history of congenital heart disease or first-degree relatives with early coronary artery disease.  She is not a smoker.        She does have asthma and allergies which have gotten worse and she had her son 5 months ago.      Outpatient Encounter Prescriptions as of 9/6/2017   Medication Sig Dispense Refill     fluticasone-vilanterol (BREO ELLIPTA) 100-25 MCG/INH oral inhaler Inhale 1 puff into the lungs daily 1 Inhaler 1     cetirizine (ZYRTEC) 10 MG tablet Take 10 mg by mouth daily       albuterol (PROAIR HFA/PROVENTIL HFA/VENTOLIN HFA) 108 (90 BASE) MCG/ACT Inhaler Inhale 2 puffs into the lungs every 6 hours as needed for shortness of breath / dyspnea or wheezing 1 Inhaler 1     ethynodiol-ethinyl estradiol (KELNOR 1/35) 1-35 MG-MCG per tablet Take 1 tablet by mouth daily 3 months on then off for one week 84 tablet 3     [DISCONTINUED] azelastine (ASTELIN) 0.1 % spray Spray 2 sprays into both  nostrils 2 times daily as needed (Patient not taking: Reported on 8/25/2017) 30 mL 3     [DISCONTINUED] fluticasone (FLONASE) 50 MCG/ACT spray Spray 2 sprays into both nostrils daily 1 Bottle 3     [DISCONTINUED] olopatadine HCl (PATADAY) 0.2 % SOLN Place 1 drop into both eyes daily as needed 2.5 mL 3     [DISCONTINUED] fluticasone (FLOVENT DISKUS) 100 MCG/BLIST AEPB Inhale 1 puff into the lungs 2 times daily 3 Inhaler 1     No facility-administered encounter medications on file as of 9/6/2017.      IMPRESSION AND PLAN:   1.  Symptomatic PVCs.   2.  History of asthma/allergies.      DISCUSSION:  It was a pleasure to see Ms. Roy in Cardiology Clinic.  Today we discussed her symptoms.  I reviewed her testing and discussed PVCs in general and what they mean, which she understood.  We discussed that there is nothing specifically to do, but the things that she can make the matter would be less alcohol, caffeine which can help.  She has been checked and a TSH was normal.     We discussed that we could give her beta blocker and that can help decrease the number and maybe how she feels symptomatically, but it could cause her to be more fatigued as well and she does not need it from a blood pressure standpoint.  She wants to hold off on that at this time.        She will come back on an as-needed basis.  All questions were answered.  It was a pleasure to see her.  Please do not hesitate to contact me with any questions or concerns.     Again, thank you for allowing me to participate in the care of your patient.      Sincerely,    Kacey Robbins MD     [facility]    cc:   HECTOR Trimble CNP  [address] [place] [id number]

## 2017-09-06 NOTE — PROGRESS NOTES
HISTORY OF PRESENT ILLNESS:  Ms. Roy is a pleasant 27-year-old woman who is a  who has a past medical history significant for allergies and asthma.  She is being seen today for symptoms of PVCs.      She reports that in the beginning August, she began to note symptoms of a big heavy beat and maybe a pause.  She went to see her primary physician who ordered all 48-hour Holter that was done on 08/23/2017 and she had 1700 PVCs with this and her symptoms did correlate with the PVCs.  She has not had any other concerning symptoms.  She is back to work and feeling well.        Her PVCs are very characteristic.  She will have a very strong beat and then what feels like a pause and then go back to her normal beats.  She has occasional lightheadedness, but has had no swelling or lower extremity edema.  There is no family history of congenital heart disease or first-degree relatives with early coronary artery disease.  She is not a smoker.        She does have asthma and allergies which have gotten worse and she had her son 5 months ago.      IMPRESSION AND PLAN:   1.  Symptomatic PVCs.   2.  History of asthma/allergies.      DISCUSSION:  It was a pleasure to see Ms. Roy in Cardiology Clinic.  Today we discussed her symptoms.  I reviewed her testing and discussed PVCs in general and what they mean, which she understood.  We discussed that there is nothing specifically to do, but the things that she can make the matter would be less alcohol, caffeine which can help.  She has been checked and a TSH was normal.     We discussed that we could give her beta blocker and that can help decrease the number and maybe how she feels symptomatically, but it could cause her to be more fatigued as well and she does not need it from a blood pressure standpoint.  She wants to hold off on that at this time.        She will come back on an as-needed basis.  All questions were answered.  It was a pleasure to see her.   Please do not hesitate to contact me with any questions or concerns.         CHARISSA PEDROZA MD             D: 2017 13:33   T: 2017 15:24   MT: FELIPA      Name:     WENDY KIRKLAND   MRN:      [id number]        Account:      [id number]   :      1990           Service Date: 2017      Document: [id number]

## 2017-12-11 ENCOUNTER — OFFICE VISIT (OUTPATIENT)
Dept: FAMILY MEDICINE | Facility: CLINIC | Age: 27
End: 2017-12-11
Payer: COMMERCIAL

## 2017-12-11 VITALS
HEIGHT: 65 IN | TEMPERATURE: 101.3 F | SYSTOLIC BLOOD PRESSURE: 122 MMHG | BODY MASS INDEX: 26.16 KG/M2 | DIASTOLIC BLOOD PRESSURE: 60 MMHG | RESPIRATION RATE: 18 BRPM | WEIGHT: 157 LBS | HEART RATE: 92 BPM

## 2017-12-11 DIAGNOSIS — R07.0 THROAT PAIN: ICD-10-CM

## 2017-12-11 DIAGNOSIS — J06.9 VIRAL URI: Primary | ICD-10-CM

## 2017-12-11 LAB
DEPRECATED S PYO AG THROAT QL EIA: NORMAL
SPECIMEN SOURCE: NORMAL

## 2017-12-11 PROCEDURE — 87880 STREP A ASSAY W/OPTIC: CPT | Performed by: FAMILY MEDICINE

## 2017-12-11 PROCEDURE — 87081 CULTURE SCREEN ONLY: CPT | Performed by: FAMILY MEDICINE

## 2017-12-11 PROCEDURE — 99213 OFFICE O/P EST LOW 20 MIN: CPT | Performed by: FAMILY MEDICINE

## 2017-12-11 NOTE — PROGRESS NOTES
SUBJECTIVE:   Fatimah Roy is a 27 year old female who presents to clinic today for the following health issues:      RESPIRATORY SYMPTOMS      Duration: Yesterday     Description  nasal congestion, sore throat, fever and fatigue/malaise    Severity: moderate    Accompanying signs and symptoms: NO APPETITE     History (predisposing factors):  strep exposure    Precipitating or alleviating factors: Is a - kids have had strep     Therapies tried and outcome:  rest and fluids, ibuprofen         Problem list and histories reviewed & adjusted, as indicated.  Additional history: as documented    Patient Active Problem List   Diagnosis     CARDIOVASCULAR SCREENING; LDL GOAL LESS THAN 160     Intermittent asthma     Seasonal allergic rhinitis     Chronic allergic conjunctivitis     Prenatal care, first pregnancy     S/P      Past Surgical History:   Procedure Laterality Date      SECTION N/A 3/24/2017    Procedure:  SECTION;  Surgeon: Renetta Saeed MD;  Location: [facility]     ESOPHAGOSCOPY, GASTROSCOPY, DUODENOSCOPY (EGD), COMBINED  2012    Procedure: COMBINED ESOPHAGOSCOPY, GASTROSCOPY, DUODENOSCOPY (EGD);  Gastroscopy;  Surgeon: Pete Arciniega MD;  Location: [facility]     MOUTH SURGERY      wisdom teeth       Social History   Substance Use Topics     Smoking status: Never Smoker     Smokeless tobacco: Never Used     Alcohol use 0.0 oz/week     0 Standard drinks or equivalent per week      Comment: rare- quit with pregnancy     Family History   Problem Relation Age of Onset     CANCER Mother      cervical      Hypertension Mother      GASTROINTESTINAL DISEASE Mother      diverticulitis     Allergy (Severe) Mother      Bee Venom and Fire Ant     HEART DISEASE Maternal Grandmother      MI     HEART DISEASE Paternal Grandfather      MI     Hepatitis Father      Substance Abuse Father      recovered alcohol     Depression Father      C.A.D. No family hx of       "DIABETES No family hx of      CEREBROVASCULAR DISEASE No family hx of      Breast Cancer No family hx of      Cancer - colorectal No family hx of          Current Outpatient Prescriptions   Medication Sig Dispense Refill     cetirizine (ZYRTEC) 10 MG tablet Take 10 mg by mouth daily       ethynodiol-ethinyl estradiol (KELNOR 1/35) 1-35 MG-MCG per tablet Take 1 tablet by mouth daily 3 months on then off for one week 84 tablet 3     albuterol (PROAIR HFA/PROVENTIL HFA/VENTOLIN HFA) 108 (90 BASE) MCG/ACT Inhaler Inhale 2 puffs into the lungs every 6 hours as needed for shortness of breath / dyspnea or wheezing (Patient not taking: Reported on 12/11/2017) 1 Inhaler 1     Allergies   Allergen Reactions     Nkda [No Known Drug Allergies]      Recent Labs   Lab Test  08/09/17   1140  07/18/16   1415   02/27/14   1603  07/24/12   1226   LDL   --    --    --   136*   --    HDL   --    --    --   66   --    TRIG   --    --    --   75   --    ALT   --    --    --    --   12   CR  0.64   --    --    --   0.75   GFRESTIMATED  >90  Non  GFR Calc     --    --    --   >90   GFRESTBLACK  >90   GFR Calc     --    --    --   >90   POTASSIUM  3.7   --    --    --   4.0   TSH  2.82  1.43   < >   --    --     < > = values in this interval not displayed.      BP Readings from Last 3 Encounters:   12/11/17 122/60   09/06/17 116/75   08/25/17 124/73    Wt Readings from Last 3 Encounters:   12/11/17 157 lb (71.2 kg)   09/06/17 164 lb 6.4 oz (74.6 kg)   08/18/17 165 lb 9.1 oz (75.1 kg)                  Labs reviewed in EPIC          Reviewed and updated as needed this visit by clinical staff     Reviewed and updated as needed this visit by Provider         ROS:  Constitutional, HEENT, cardiovascular, pulmonary, gi and gu systems are negative, except as otherwise noted.      OBJECTIVE:   /60 (Cuff Size: Adult Regular)  Pulse 92  Temp 101.3  F (38.5  C) (Tympanic)  Resp 18  Ht 5' 4.5\" (1.638 m)  Wt 157 " lb (71.2 kg)  Breastfeeding? No  BMI 26.53 kg/m2  Body mass index is 26.53 kg/(m^2).  GENERAL: healthy, alert and no distress  EYES: Eyes grossly normal to inspection, PERRL and conjunctivae and sclerae normal  HENT: normal cephalic/atraumatic, ear canals and TM's normal, oral mucous membranes moist, oropharxnx crowded, tonsillar hypertrophy and tonsillar erythema  NECK: no adenopathy, no asymmetry, masses, or scars and thyroid normal to palpation  RESP: lungs clear to auscultation - no rales, rhonchi or wheezes  CV: regular rates and rhythm, normal S1 S2, no S3 or S4 and no murmur, click or rub  MS: no gross musculoskeletal defects noted, no edema  NEURO: Normal strength and tone, mentation intact and speech normal    Diagnostic Test Results:  Results for orders placed or performed in visit on 12/11/17 (from the past 24 hour(s))   Strep, Rapid Screen   Result Value Ref Range    Specimen Description Throat     Rapid Strep A Screen       NEGATIVE: No Group A streptococcal antigen detected by immunoassay, await culture report.       ASSESSMENT/PLAN:         ICD-10-CM    1. Viral URI J06.9     B97.89    2. Throat pain R07.0 Strep, Rapid Screen     Beta strep group A culture       Discussed in detail differentials and further management. Symptoms are likely secondary to viral infection. Recommended well hydration, over-the-counter analgesia, warm fluids and saline gargles. Written instructions/information provided. Patient understood and in agreement with the above plan. All questions are answered. Follow-up if symptoms persist or worsen.        Patient Instructions     Viral Upper Respiratory Illness (Adult)  You have a viral upper respiratory illness (URI), which is another term for the common cold. This illness is contagious during the first few days. It is spread through the air by coughing and sneezing. It may also be spread by direct contact (touching the sick person and then touching your own eyes, nose, or  mouth). Frequent handwashing will decrease risk of spread. Most viral illnesses go away within 7 to 10 days with rest and simple home remedies. Sometimes the illness may last for several weeks. Antibiotics will not kill a virus, and they are generally not prescribed for this condition.    Home care    If symptoms are severe, rest at home for the first 2 to 3 days. When you resume activity, don't let yourself get too tired.    Avoid being exposed to cigarette smoke (yours or others ).    You may use acetaminophen or ibuprofen to control pain and fever, unless another medicine was prescribed. (Note: If you have chronic liver or kidney disease, have ever had a stomach ulcer or gastrointestinal bleeding, or are taking blood-thinning medicines, talk with your healthcare provider before using these medicines.) Aspirin should never be given to anyone under 18 years of age who is ill with a viral infection or fever. It may cause severe liver or brain damage.    Your appetite may be poor, so a light diet is fine. Avoid dehydration by drinking 6 to 8 glasses of fluids per day (water, soft drinks, juices, tea, or soup). Extra fluids will help loosen secretions in the nose and lungs.    Over-the-counter cold medicines will not shorten the length of time you re sick, but they may be helpful for the following symptoms: cough, sore throat, and nasal and sinus congestion. (Note: Do not use decongestants if you have high blood pressure.)  Follow-up care  Follow up with your healthcare provider, or as advised.  When to seek medical advice  Call your healthcare provider right away if any of these occur:    Cough with lots of colored sputum (mucus)    Severe headache; face, neck, or ear pain    Difficulty swallowing due to throat pain    Fever of 100.4 F (38 C)  Call 911, or get immediate medical care  Call emergency services right away if any of these occur:    Chest pain, shortness of breath, wheezing, or difficulty  breathing    Coughing up blood    Inability to swallow due to throat pain  Date Last Reviewed: 9/13/2015 2000-2017 [employer]. [address] [place] [address], [place] [address]. All rights reserved. This information is not intended as a substitute for professional medical care. Always follow your healthcare professional's instructions.            Pedro Luis Leavitt MD  [place] [address]

## 2017-12-11 NOTE — MR AVS SNAPSHOT
After Visit Summary   12/11/2017    Fatimah Roy    MRN: [id number]           Patient Information     Date Of Birth          1990        Visit Information        Provider Department      12/11/2017 9:40 AM Pedro Luis Leavitt MD [facility]        Today's Diagnoses     Throat pain    -  1      Care Instructions      Viral Upper Respiratory Illness (Adult)  You have a viral upper respiratory illness (URI), which is another term for the common cold. This illness is contagious during the first few days. It is spread through the air by coughing and sneezing. It may also be spread by direct contact (touching the sick person and then touching your own eyes, nose, or mouth). Frequent handwashing will decrease risk of spread. Most viral illnesses go away within 7 to 10 days with rest and simple home remedies. Sometimes the illness may last for several weeks. Antibiotics will not kill a virus, and they are generally not prescribed for this condition.    Home care    If symptoms are severe, rest at home for the first 2 to 3 days. When you resume activity, don't let yourself get too tired.    Avoid being exposed to cigarette smoke (yours or others ).    You may use acetaminophen or ibuprofen to control pain and fever, unless another medicine was prescribed. (Note: If you have chronic liver or kidney disease, have ever had a stomach ulcer or gastrointestinal bleeding, or are taking blood-thinning medicines, talk with your healthcare provider before using these medicines.) Aspirin should never be given to anyone under 18 years of age who is ill with a viral infection or fever. It may cause severe liver or brain damage.    Your appetite may be poor, so a light diet is fine. Avoid dehydration by drinking 6 to 8 glasses of fluids per day (water, soft drinks, juices, tea, or soup). Extra fluids will help loosen secretions in the nose and lungs.    Over-the-counter cold medicines will not shorten  the length of time you re sick, but they may be helpful for the following symptoms: cough, sore throat, and nasal and sinus congestion. (Note: Do not use decongestants if you have high blood pressure.)  Follow-up care  Follow up with your healthcare provider, or as advised.  When to seek medical advice  Call your healthcare provider right away if any of these occur:    Cough with lots of colored sputum (mucus)    Severe headache; face, neck, or ear pain    Difficulty swallowing due to throat pain    Fever of 100.4 F (38 C)  Call 911, or get immediate medical care  Call emergency services right away if any of these occur:    Chest pain, shortness of breath, wheezing, or difficulty breathing    Coughing up blood    Inability to swallow due to throat pain  Date Last Reviewed: 9/13/2015 2000-2017 [employer]. [address] [place] [address]. All rights reserved. This information is not intended as a substitute for professional medical care. Always follow your healthcare professional's instructions.                Follow-ups after your visit        Who to contact     If you have questions or need follow up information about today's clinic visit or your schedule please contact [facility] directly at [phone].  Normal or non-critical lab and imaging results will be communicated to you by MyChart, letter or phone within 4 business days after the clinic has received the results. If you do not hear from us within 7 days, please contact the clinic through [employer] or phone. If you have a critical or abnormal lab result, we will notify you by phone as soon as possible.  Submit refill requests through [employer] or call your pharmacy and they will forward the refill request to us. Please allow 3 business days for your refill to be completed.          Additional Information About Your Visit        [employer] Information     [employer] gives you secure access to your electronic health record.  "If you see a primary care provider, you can also send messages to your care team and make appointments. If you have questions, please call your primary care clinic.  If you do not have a primary care provider, please call [phone] and they will assist you.        Care EveryWhere ID     This is your Care EveryWhere ID. This could be used by other organizations to access your [place] medical records  [id number]        Your Vitals Were     Pulse Temperature Respirations Height Breastfeeding? BMI (Body Mass Index)    92 101.3  F (38.5  C) (Tympanic) 18 5' 4.5\" (1.638 m) No 26.53 kg/m2       Blood Pressure from Last 3 Encounters:   12/11/17 122/60   09/06/17 116/75   08/25/17 124/73    Weight from Last 3 Encounters:   12/11/17 157 lb (71.2 kg)   09/06/17 164 lb 6.4 oz (74.6 kg)   08/18/17 165 lb 9.1 oz (75.1 kg)              We Performed the Following     Strep, Rapid Screen        Primary Care Provider Office Phone # Fax #    Kristen Manrique, APRN [facility] [phone]       [address] [place] [address]        Equal Access to Services     JE SHAW AH: Hadii betsy mcintyreo Sobakariali, waaxda luqadaha, qaybta kaalmada adeegyada, erika tobar. So [facility] [phone].    ATENCIÓN: Si habla español, tiene a barrios disposición servicios gratuitos de asistencia lingüística. Llame al [phone].    We comply with applicable federal civil rights laws and [place] laws. We do not discriminate on the basis of race, color, national origin, age, disability, sex, sexual orientation, or gender identity.            Thank you!     Thank you for choosing [facility]  for your care. Our goal is always to provide you with excellent care. Hearing back from our patients is one way we can continue to improve our services. Please take a few minutes to complete the written survey that you may receive in the mail after your visit with us. Thank you!             Your Updated " Medication List - Protect others around you: Learn how to safely use, store and throw away your medicines at www.disposemymeds.org.          This list is accurate as of: 12/11/17 10:15 AM.  Always use your most recent med list.                   Brand Name Dispense Instructions for use Diagnosis    albuterol 108 (90 BASE) MCG/ACT Inhaler    PROAIR HFA/PROVENTIL HFA/VENTOLIN HFA    1 Inhaler    Inhale 2 puffs into the lungs every 6 hours as needed for shortness of breath / dyspnea or wheezing    Moderate persistent asthma without complication       cetirizine 10 MG tablet    zyrTEC     Take 10 mg by mouth daily        ethynodiol-ethinyl estradiol 1-35 MG-MCG per tablet    KELNOR 1/35    84 tablet    Take 1 tablet by mouth daily 3 months on then off for one week    Encounter for initial prescription of contraceptive pills

## 2017-12-11 NOTE — NURSING NOTE
"Chief Complaint   Patient presents with     URI       Initial /60 (Cuff Size: Adult Regular)  Pulse 92  Temp 101.3  F (38.5  C) (Tympanic)  Resp 18  Ht 5' 4.5\" (1.638 m)  Wt 157 lb (71.2 kg)  Breastfeeding? No  BMI 26.53 kg/m2 Estimated body mass index is 26.53 kg/(m^2) as calculated from the following:    Height as of this encounter: 5' 4.5\" (1.638 m).    Weight as of this encounter: 157 lb (71.2 kg).  Medication Reconciliation: complete    Health Maintenance that is potentially due pending provider review:  NONE    n/a    Is there anyone who you would like to be able to receive your results? Not Applicable  If yes have patient fill out ARI    "

## 2017-12-11 NOTE — LETTER
December 12, 2017      Fatimah Roy  [address] [facility] [address]        Dear Fatimah,       The results of your recent throat culture were negative.  If you have any questions or concerns please call your care team at the number listed at the top of this letter.          Sincerely,        Pedro Luis Leavitt MD

## 2017-12-11 NOTE — PATIENT INSTRUCTIONS

## 2017-12-12 LAB
BACTERIA SPEC CULT: NORMAL
SPECIMEN SOURCE: NORMAL

## 2017-12-12 ASSESSMENT — ASTHMA QUESTIONNAIRES: ACT_TOTALSCORE: 21

## 2018-03-26 ENCOUNTER — OFFICE VISIT (OUTPATIENT)
Dept: FAMILY MEDICINE | Facility: CLINIC | Age: 28
End: 2018-03-26
Payer: COMMERCIAL

## 2018-03-26 ENCOUNTER — RADIANT APPOINTMENT (OUTPATIENT)
Dept: GENERAL RADIOLOGY | Facility: CLINIC | Age: 28
End: 2018-03-26
Attending: FAMILY MEDICINE
Payer: COMMERCIAL

## 2018-03-26 VITALS
BODY MASS INDEX: 23.32 KG/M2 | DIASTOLIC BLOOD PRESSURE: 80 MMHG | HEART RATE: 93 BPM | TEMPERATURE: 99 F | SYSTOLIC BLOOD PRESSURE: 106 MMHG | HEIGHT: 65 IN | RESPIRATION RATE: 18 BRPM | OXYGEN SATURATION: 95 % | WEIGHT: 140 LBS

## 2018-03-26 DIAGNOSIS — J01.00 ACUTE MAXILLARY SINUSITIS, RECURRENCE NOT SPECIFIED: Primary | ICD-10-CM

## 2018-03-26 DIAGNOSIS — R05.9 COUGH: ICD-10-CM

## 2018-03-26 DIAGNOSIS — J45.901 MILD ASTHMA WITH EXACERBATION, UNSPECIFIED WHETHER PERSISTENT: ICD-10-CM

## 2018-03-26 PROCEDURE — 71046 X-RAY EXAM CHEST 2 VIEWS: CPT | Mod: FY

## 2018-03-26 PROCEDURE — 99214 OFFICE O/P EST MOD 30 MIN: CPT | Performed by: FAMILY MEDICINE

## 2018-03-26 RX ORDER — PREDNISONE 20 MG/1
40 TABLET ORAL DAILY
Qty: 10 TABLET | Refills: 0 | Status: SHIPPED | OUTPATIENT
Start: 2018-03-26 | End: 2018-03-31

## 2018-03-26 NOTE — PATIENT INSTRUCTIONS
"  Asthma (Adult)  Asthma is a disease where the medium and  small air passages within the lung go into spasm and restrict the flow of air. Inflammation and swelling of the airways cause further blockage. During an acute asthma attack, these factors cause trouble breathing, wheezing, cough and chest tightness.    An asthma attack can be triggered by many things. Common triggers include infections such as the common cold, bronchitis, and pneumonia. Irritants such as smoke or pollutants in the air, very cold air, emotional upset, and exercise can also trigger an attack. In many adults with asthma, allergies to dust, mold, pollen and animal dander can cause an asthma attack. Skipping doses of daily asthma medicine can also bring on an asthma attack.  Asthma can be controlled using the proper medicines prescribed by your healthcare provider and avoiding exposure to known triggers including allergens and irritants.  Home care    Take prescribed medicine exactly at the times advised. If you need medicine such as from a hand held inhaler or aerosol breathing machine more than every 4 hours, contact your healthcare provider or seek immediate medical attention. If prescribed an antibiotic or prednisone, take all of the medicine as prescribed, even if you are feeling better after a few days.    Do not smoke. Avoid being exposed to the smoke of others.    Some people with asthma have worsening of their symptoms when they take aspirin and non-steroidal or fever-reducing medicines like ibuprofen and naproxen. Talk to your healthcare provider if you think this may apply to you.  Follow-up care  Follow up with your healthcare provider, or as advised. Always bring all of your current medicines to any appointments with your healthcare provider. Also bring a complete list of medicines even those not taken for asthma. If you do not already have one, talk to your healthcare provider about developing a personalized \"Asthma Action " "Plan.\"  A pneumococcal (pneumonia) vaccine and yearly flu shot (every fall) are recommended. Ask your doctor about this.  When to seek medical advice  Call your healthcare provider right away if any of these occur:     Increased wheezing or shortness of breath    Need to use your inhalers more often than usual without relief    Fever of 100.4 F (38 C) or higher, or as directed by your healthcare provider    Coughing up lots of dark-colored or bloody sputum (mucus)    Chest pain with each breath    If you use a peak flow meter as part of an Asthma Action Plan, and you are still in the yellow zone (50% to 80%) 15 minutes after using inhaler medicine.  Call 911  Call 911 if any of the following occur    Trouble walking or talking because of shortness of breath    If you use a peak flow meter as part of an Asthma Action Plan and you are still in the red zone (less than 50%) 15 minutes after using inhaler medicine    Lips or fingernails turning gray or blue  Date Last Reviewed: 5/1/2017 2000-2017 [employer]. [address] [place] [address]. All rights reserved. This information is not intended as a substitute for professional medical care. Always follow your healthcare professional's instructions.        Acute Sinusitis    Acute sinusitis is irritation and swelling of the sinuses. It is usually caused by a viral infection after a common cold. Your doctor can help you find relief.  What is acute sinusitis?  Sinuses are air-filled spaces in the skull behind the face. They are kept moist and clean by a lining of mucosa. Things such as pollen, smoke, and chemical fumes can irritate the mucosa. It can then swell up. As a response to irritation, the mucosa makes more mucus and other fluids. Tiny hairlike cilia cover the mucosa. Cilia help carry mucus toward the opening of the sinus. Too much mucus may cause the cilia to stop working. This blocks the sinus opening. A buildup of fluid in the sinuses " then causes pain and pressure. It can also encourage bacteria to grow in the sinuses.  Common symptoms of acute sinusitis  You may have:    Facial soreness pain    Headache    Fever    Fluid draining in the back of the throat (postnasal drip)    Congestion    Drainage that is thick and colored, instead of clear    Cough  Diagnosing acute sinusitis  Your doctor will ask about your symptoms and health history. He or she will look at your ear, nose, and throat. You usually won't need to have X-rays taken.    The doctor may take a sample of mucus to check for bacteria. If you have sinusitis that keeps coming back, you may need imaging tests such as X-rays or CAT scans. This will help your doctor check for a structural problem that may be causing the infection.  Treating acute sinusitis  Treatment is aimed at unblocking the sinus opening and helping the cilia work again. You may need to take antihistamine and decongestant medicine. These can reduce inflammation and decrease the amount of fluid your sinuses make. If you have a bacterial infection, you will need to take antibiotic medicine for 10 to 14 days. Take this medicine until it is gone, even if you feel better.  Date Last Reviewed: 10/1/2016    4966-2907 [employer]. [address] [place] [address], [place] [address]. All rights reserved. This information is not intended as a substitute for professional medical care. Always follow your healthcare professional's instructions.

## 2018-03-26 NOTE — PROGRESS NOTES
SUBJECTIVE:   Fatimah Roy is a 27 year old female who presents to clinic today for the following health issues:      RESPIRATORY SYMPTOMS      Duration: 8 days     Description  nasal congestion, facial pain/pressure, cough, fever, headache and conjunctival irritation    Severity: moderate    Accompanying signs and symptoms: Chest congestion- very rattley- can't get anything up, no appetite     History (predisposing factors):  Teacher, asthma     Precipitating or alleviating factors: None    Therapies tried and outcome:  rest and fluids, mucinex, inhaler, Tylenol       Problem list and histories reviewed & adjusted, as indicated.  Additional history: as documented    Patient Active Problem List   Diagnosis     CARDIOVASCULAR SCREENING; LDL GOAL LESS THAN 160     Intermittent asthma     Seasonal allergic rhinitis     Chronic allergic conjunctivitis     Prenatal care, first pregnancy     S/P      Past Surgical History:   Procedure Laterality Date      SECTION N/A 3/24/2017    Procedure:  SECTION;  Surgeon: Renetta Saeed MD;  Location: [facility]     ESOPHAGOSCOPY, GASTROSCOPY, DUODENOSCOPY (EGD), COMBINED  2012    Procedure: COMBINED ESOPHAGOSCOPY, GASTROSCOPY, DUODENOSCOPY (EGD);  Gastroscopy;  Surgeon: Pete Arciniega MD;  Location: [facility]     GYN SURGERY  3/24/17         MOUTH SURGERY      wisdom teeth       Social History   Substance Use Topics     Smoking status: Never Smoker     Smokeless tobacco: Never Used     Alcohol use 0.0 oz/week     0 Standard drinks or equivalent per week      Comment: rare- quit with pregnancy     Family History   Problem Relation Age of Onset     CANCER Mother      cervical      Hypertension Mother      GASTROINTESTINAL DISEASE Mother      diverticulitis     Allergy (Severe) Mother      Bee Venom and Fire Ant     Other Cancer Mother      Cervical Cancer     OSTEOPOROSIS Mother      HEART DISEASE Maternal Grandmother      MI     MENTAL  "ILLNESS Maternal Grandmother      Paranoia Dementia     HEART DISEASE Paternal Grandfather      MI     Hepatitis Father      Substance Abuse Father      Sober for 15 years     Depression Father      C.A.D. No family hx of      DIABETES No family hx of      CEREBROVASCULAR DISEASE No family hx of      Breast Cancer No family hx of      Cancer - colorectal No family hx of          Current Outpatient Prescriptions   Medication Sig Dispense Refill     cetirizine (ZYRTEC) 10 MG tablet Take 10 mg by mouth daily       albuterol (PROAIR HFA/PROVENTIL HFA/VENTOLIN HFA) 108 (90 BASE) MCG/ACT Inhaler Inhale 2 puffs into the lungs every 6 hours as needed for shortness of breath / dyspnea or wheezing 1 Inhaler 1     Allergies   Allergen Reactions     Nkda [No Known Drug Allergies]      Recent Labs   Lab Test  08/09/17   1140  07/18/16   1415   02/27/14   1603  07/24/12   1226   LDL   --    --    --   136*   --    HDL   --    --    --   66   --    TRIG   --    --    --   75   --    ALT   --    --    --    --   12   CR  0.64   --    --    --   0.75   GFRESTIMATED  >90  Non  GFR Calc     --    --    --   >90   GFRESTBLACK  >90   GFR Calc     --    --    --   >90   POTASSIUM  3.7   --    --    --   4.0   TSH  2.82  1.43   < >   --    --     < > = values in this interval not displayed.      BP Readings from Last 3 Encounters:   03/26/18 106/80   12/11/17 122/60   09/06/17 116/75    Wt Readings from Last 3 Encounters:   03/26/18 140 lb (63.5 kg)   12/11/17 157 lb (71.2 kg)   09/06/17 164 lb 6.4 oz (74.6 kg)                  Labs reviewed in EPIC    Reviewed and updated as needed this visit by clinical staff       Reviewed and updated as needed this visit by Provider         ROS:   ROS: 10 point ROS neg other than the symptoms noted above in the HPI.    OBJECTIVE:     /80 (Cuff Size: Adult Regular)  Pulse 93  Temp 99  F (37.2  C) (Tympanic)  Resp 18  Ht 5' 4.5\" (1.638 m)  Wt 140 lb (63.5 kg)  " LMP 02/26/2018  SpO2 95%  Breastfeeding? No  BMI 23.66 kg/m2  Body mass index is 23.66 kg/(m^2).  GENERAL: healthy, alert and no distress  EYES: Eyes grossly normal to inspection, PERRL and conjunctivae and sclerae normal  HENT: ear canals and TM's normal, nose and mouth without ulcers or lesions  NECK: no adenopathy, no asymmetry, masses, or scars and thyroid normal to palpation  RESP: expiratory wheeze bilateral along with bibasilar crackles no rhonchi auscultated  CV: regular rates and rhythm, normal S1 S2, no S3 or S4 and no murmur, click or rub  ABDOMEN: soft, nontender, no hepatosplenomegaly, no masses and bowel sounds normal  MS: no gross musculoskeletal defects noted, no edema    CHEST TWO VIEWS   3/26/2018 4:28 PM      HISTORY: Cough, wheezing, bilateral crackles.     COMPARISON: 8/21/2017.      FINDINGS: Cardiomediastinal silhouette within normal limits. No focal  airspace opacities. No pleural effusion. No pneumothorax identified.  The bones are unremarkable.          IMPRESSION: No acute cardiopulmonary abnormalities.    ASSESSMENT/PLAN:         ICD-10-CM    1. Acute maxillary sinusitis, recurrence not specified J01.00 amoxicillin-clavulanate (AUGMENTIN) 875-125 MG per tablet   2. Cough R05 XR Chest 2 Views   3. Mild asthma with exacerbation, unspecified whether persistent J45.901 predniSONE (DELTASONE) 20 MG tablet       Suspect symptoms secondary to acute sinusitis and mild asthma exacerbation.  Chest x-ray negative.  Augmentin prescribed and suggested to start taking antibiotic after 2 days if symptoms persist.  Prednisone ordered for wheezing and suggested to continue using albuterol inhaler.  Recommended warm fluids, well hydration, humidifier use.  Written information provided.  Patient understood and in agreement with above plan.  All questions answered.        Patient Instructions     Asthma (Adult)  Asthma is a disease where the medium and  small air passages within the lung go into spasm and  "restrict the flow of air. Inflammation and swelling of the airways cause further blockage. During an acute asthma attack, these factors cause trouble breathing, wheezing, cough and chest tightness.    An asthma attack can be triggered by many things. Common triggers include infections such as the common cold, bronchitis, and pneumonia. Irritants such as smoke or pollutants in the air, very cold air, emotional upset, and exercise can also trigger an attack. In many adults with asthma, allergies to dust, mold, pollen and animal dander can cause an asthma attack. Skipping doses of daily asthma medicine can also bring on an asthma attack.  Asthma can be controlled using the proper medicines prescribed by your healthcare provider and avoiding exposure to known triggers including allergens and irritants.  Home care    Take prescribed medicine exactly at the times advised. If you need medicine such as from a hand held inhaler or aerosol breathing machine more than every 4 hours, contact your healthcare provider or seek immediate medical attention. If prescribed an antibiotic or prednisone, take all of the medicine as prescribed, even if you are feeling better after a few days.    Do not smoke. Avoid being exposed to the smoke of others.    Some people with asthma have worsening of their symptoms when they take aspirin and non-steroidal or fever-reducing medicines like ibuprofen and naproxen. Talk to your healthcare provider if you think this may apply to you.  Follow-up care  Follow up with your healthcare provider, or as advised. Always bring all of your current medicines to any appointments with your healthcare provider. Also bring a complete list of medicines even those not taken for asthma. If you do not already have one, talk to your healthcare provider about developing a personalized \"Asthma Action Plan.\"  A pneumococcal (pneumonia) vaccine and yearly flu shot (every fall) are recommended. Ask your doctor about " this.  When to seek medical advice  Call your healthcare provider right away if any of these occur:     Increased wheezing or shortness of breath    Need to use your inhalers more often than usual without relief    Fever of 100.4 F (38 C) or higher, or as directed by your healthcare provider    Coughing up lots of dark-colored or bloody sputum (mucus)    Chest pain with each breath    If you use a peak flow meter as part of an Asthma Action Plan, and you are still in the yellow zone (50% to 80%) 15 minutes after using inhaler medicine.  Call 911  Call 911 if any of the following occur    Trouble walking or talking because of shortness of breath    If you use a peak flow meter as part of an Asthma Action Plan and you are still in the red zone (less than 50%) 15 minutes after using inhaler medicine    Lips or fingernails turning gray or blue  Date Last Reviewed: 5/1/2017 2000-2017 [employer]. [address] [place] [address]. All rights reserved. This information is not intended as a substitute for professional medical care. Always follow your healthcare professional's instructions.        Acute Sinusitis    Acute sinusitis is irritation and swelling of the sinuses. It is usually caused by a viral infection after a common cold. Your doctor can help you find relief.  What is acute sinusitis?  Sinuses are air-filled spaces in the skull behind the face. They are kept moist and clean by a lining of mucosa. Things such as pollen, smoke, and chemical fumes can irritate the mucosa. It can then swell up. As a response to irritation, the mucosa makes more mucus and other fluids. Tiny hairlike cilia cover the mucosa. Cilia help carry mucus toward the opening of the sinus. Too much mucus may cause the cilia to stop working. This blocks the sinus opening. A buildup of fluid in the sinuses then causes pain and pressure. It can also encourage bacteria to grow in the sinuses.  Common symptoms of acute  sinusitis  You may have:    Facial soreness pain    Headache    Fever    Fluid draining in the back of the throat (postnasal drip)    Congestion    Drainage that is thick and colored, instead of clear    Cough  Diagnosing acute sinusitis  Your doctor will ask about your symptoms and health history. He or she will look at your ear, nose, and throat. You usually won't need to have X-rays taken.    The doctor may take a sample of mucus to check for bacteria. If you have sinusitis that keeps coming back, you may need imaging tests such as X-rays or CAT scans. This will help your doctor check for a structural problem that may be causing the infection.  Treating acute sinusitis  Treatment is aimed at unblocking the sinus opening and helping the cilia work again. You may need to take antihistamine and decongestant medicine. These can reduce inflammation and decrease the amount of fluid your sinuses make. If you have a bacterial infection, you will need to take antibiotic medicine for 10 to 14 days. Take this medicine until it is gone, even if you feel better.  Date Last Reviewed: 10/1/2016    [id number] [employer]. [address] [place] [id number]. All rights reserved. This information is not intended as a substitute for professional medical care. Always follow your healthcare professional's instructions.            Pedro Luis Leavitt MD  [facility]

## 2018-03-26 NOTE — MR AVS SNAPSHOT
After Visit Summary   3/26/2018    Fatimah Roy    MRN: [id number]           Patient Information     Date Of Birth          1990        Visit Information        Provider Department      3/26/2018 3:40 PM Pedro Luis Leavitt MD [facility]        Today's Diagnoses     Cough    -  1    Acute maxillary sinusitis, recurrence not specified        Mild asthma with exacerbation, unspecified whether persistent          Care Instructions      Asthma (Adult)  Asthma is a disease where the medium and  small air passages within the lung go into spasm and restrict the flow of air. Inflammation and swelling of the airways cause further blockage. During an acute asthma attack, these factors cause trouble breathing, wheezing, cough and chest tightness.    An asthma attack can be triggered by many things. Common triggers include infections such as the common cold, bronchitis, and pneumonia. Irritants such as smoke or pollutants in the air, very cold air, emotional upset, and exercise can also trigger an attack. In many adults with asthma, allergies to dust, mold, pollen and animal dander can cause an asthma attack. Skipping doses of daily asthma medicine can also bring on an asthma attack.  Asthma can be controlled using the proper medicines prescribed by your healthcare provider and avoiding exposure to known triggers including allergens and irritants.  Home care    Take prescribed medicine exactly at the times advised. If you need medicine such as from a hand held inhaler or aerosol breathing machine more than every 4 hours, contact your healthcare provider or seek immediate medical attention. If prescribed an antibiotic or prednisone, take all of the medicine as prescribed, even if you are feeling better after a few days.    Do not smoke. Avoid being exposed to the smoke of others.    Some people with asthma have worsening of their symptoms when they take aspirin and non-steroidal or  "fever-reducing medicines like ibuprofen and naproxen. Talk to your healthcare provider if you think this may apply to you.  Follow-up care  Follow up with your healthcare provider, or as advised. Always bring all of your current medicines to any appointments with your healthcare provider. Also bring a complete list of medicines even those not taken for asthma. If you do not already have one, talk to your healthcare provider about developing a personalized \"Asthma Action Plan.\"  A pneumococcal (pneumonia) vaccine and yearly flu shot (every fall) are recommended. Ask your doctor about this.  When to seek medical advice  Call your healthcare provider right away if any of these occur:     Increased wheezing or shortness of breath    Need to use your inhalers more often than usual without relief    Fever of 100.4 F (38 C) or higher, or as directed by your healthcare provider    Coughing up lots of dark-colored or bloody sputum (mucus)    Chest pain with each breath    If you use a peak flow meter as part of an Asthma Action Plan, and you are still in the yellow zone (50% to 80%) 15 minutes after using inhaler medicine.  Call 911  Call 911 if any of the following occur    Trouble walking or talking because of shortness of breath    If you use a peak flow meter as part of an Asthma Action Plan and you are still in the red zone (less than 50%) 15 minutes after using inhaler medicine    Lips or fingernails turning gray or blue  Date Last Reviewed: 5/1/2017 2000-2017 [employer]. [address] [place] [address]. All rights reserved. This information is not intended as a substitute for professional medical care. Always follow your healthcare professional's instructions.        Acute Sinusitis    Acute sinusitis is irritation and swelling of the sinuses. It is usually caused by a viral infection after a common cold. Your doctor can help you find relief.  What is acute sinusitis?  Sinuses are " air-filled spaces in the skull behind the face. They are kept moist and clean by a lining of mucosa. Things such as pollen, smoke, and chemical fumes can irritate the mucosa. It can then swell up. As a response to irritation, the mucosa makes more mucus and other fluids. Tiny hairlike cilia cover the mucosa. Cilia help carry mucus toward the opening of the sinus. Too much mucus may cause the cilia to stop working. This blocks the sinus opening. A buildup of fluid in the sinuses then causes pain and pressure. It can also encourage bacteria to grow in the sinuses.  Common symptoms of acute sinusitis  You may have:    Facial soreness pain    Headache    Fever    Fluid draining in the back of the throat (postnasal drip)    Congestion    Drainage that is thick and colored, instead of clear    Cough  Diagnosing acute sinusitis  Your doctor will ask about your symptoms and health history. He or she will look at your ear, nose, and throat. You usually won't need to have X-rays taken.    The doctor may take a sample of mucus to check for bacteria. If you have sinusitis that keeps coming back, you may need imaging tests such as X-rays or CAT scans. This will help your doctor check for a structural problem that may be causing the infection.  Treating acute sinusitis  Treatment is aimed at unblocking the sinus opening and helping the cilia work again. You may need to take antihistamine and decongestant medicine. These can reduce inflammation and decrease the amount of fluid your sinuses make. If you have a bacterial infection, you will need to take antibiotic medicine for 10 to 14 days. Take this medicine until it is gone, even if you feel better.  Date Last Reviewed: 10/1/2016    5951-2749 [employer]. [address] [place] [address], [place] [address]. All rights reserved. This information is not intended as a substitute for professional medical care. Always follow your healthcare professional's instructions.              "   Follow-ups after your visit        Who to contact     If you have questions or need follow up information about today's clinic visit or your schedule please contact [facility] directly at [phone].  Normal or non-critical lab and imaging results will be communicated to you by MyChart, letter or phone within 4 business days after the clinic has received the results. If you do not hear from us within 7 days, please contact the clinic through Energy[employer] or phone. If you have a critical or abnormal lab result, we will notify you by phone as soon as possible.  Submit refill requests through [employer] or call your pharmacy and they will forward the refill request to us. Please allow 3 business days for your refill to be completed.          Additional Information About Your Visit        [employer] Information     [employer] gives you secure access to your electronic health record. If you see a primary care provider, you can also send messages to your care team and make appointments. If you have questions, please call your primary care clinic.  If you do not have a primary care provider, please call [phone] and they will assist you.        Care EveryWhere ID     This is your Care EveryWhere ID. This could be used by other organizations to access your [place] medical records  [id number]        Your Vitals Were     Pulse Temperature Respirations Height Last Period Pulse Oximetry    93 99  F (37.2  C) (Tympanic) 18 5' 4.5\" (1.638 m) 02/26/2018 95%    Breastfeeding? BMI (Body Mass Index)                No 23.66 kg/m2           Blood Pressure from Last 3 Encounters:   03/26/18 106/80   12/11/17 122/60   09/06/17 116/75    Weight from Last 3 Encounters:   03/26/18 140 lb (63.5 kg)   12/11/17 157 lb (71.2 kg)   09/06/17 164 lb 6.4 oz (74.6 kg)                 Today's Medication Changes          These changes are accurate as of 3/26/18  4:32 PM.  If you have any questions, ask your nurse or doctor.             "   Start taking these medicines.        Dose/Directions    amoxicillin-clavulanate 875-125 MG per tablet   Commonly known as:  AUGMENTIN   Used for:  Acute maxillary sinusitis, recurrence not specified   Started by:  Pedro Luis Leavitt MD        Dose:  1 tablet   Take 1 tablet by mouth 2 times daily for 10 days   Quantity:  20 tablet   Refills:  0       predniSONE 20 MG tablet   Commonly known as:  DELTASONE   Used for:  Mild asthma with exacerbation, unspecified whether persistent   Started by:  Pedro Luis Leavitt MD        Dose:  40 mg   Take 2 tablets (40 mg) by mouth daily for 5 days   Quantity:  10 tablet   Refills:  0            Where to get your medicines      These medications were sent to [facility] [address]     Phone:  [phone]     amoxicillin-clavulanate 875-125 MG per tablet    predniSONE 20 MG tablet                Primary Care Provider Office Phone # Fax #    Kristen Laurent Burton, APRN [facility] [phone]       [address] [place] [address]        Equal Access to Services     HAYES SHAW : Hadii betsy ku hadasho Soomaali, waaxda luqadaha, qaybta kaalmada adeegyada, waxay idiin hayromain alva . So [facility] [phone].    ATENCIÓN: Si habla español, tiene a barrios disposición servicios gratuitos de asistencia lingüística. Llame al [phone].    We comply with applicable federal civil rights laws and [place] laws. We do not discriminate on the basis of race, color, national origin, age, disability, sex, sexual orientation, or gender identity.            Thank you!     Thank you for choosing [facility]  for your care. Our goal is always to provide you with excellent care. Hearing back from our patients is one way we can continue to improve our services. Please take a few minutes to complete the written survey that you may receive in the mail after your visit with us. Thank  you!             Your Updated Medication List - Protect others around you: Learn how to safely use, store and throw away your medicines at www.disposemymeds.org.          This list is accurate as of 3/26/18  4:32 PM.  Always use your most recent med list.                   Brand Name Dispense Instructions for use Diagnosis    albuterol 108 (90 BASE) MCG/ACT Inhaler    PROAIR HFA/PROVENTIL HFA/VENTOLIN HFA    1 Inhaler    Inhale 2 puffs into the lungs every 6 hours as needed for shortness of breath / dyspnea or wheezing    Moderate persistent asthma without complication       amoxicillin-clavulanate 875-125 MG per tablet    AUGMENTIN    20 tablet    Take 1 tablet by mouth 2 times daily for 10 days    Acute maxillary sinusitis, recurrence not specified       cetirizine 10 MG tablet    zyrTEC     Take 10 mg by mouth daily        predniSONE 20 MG tablet    DELTASONE    10 tablet    Take 2 tablets (40 mg) by mouth daily for 5 days    Mild asthma with exacerbation, unspecified whether persistent

## 2018-06-11 ENCOUNTER — OFFICE VISIT (OUTPATIENT)
Dept: FAMILY MEDICINE | Facility: CLINIC | Age: 28
End: 2018-06-11
Payer: COMMERCIAL

## 2018-06-11 VITALS
DIASTOLIC BLOOD PRESSURE: 72 MMHG | BODY MASS INDEX: 22.75 KG/M2 | HEART RATE: 88 BPM | RESPIRATION RATE: 16 BRPM | SYSTOLIC BLOOD PRESSURE: 100 MMHG | WEIGHT: 134.6 LBS | TEMPERATURE: 97.7 F

## 2018-06-11 DIAGNOSIS — J30.2 CHRONIC SEASONAL ALLERGIC RHINITIS, UNSPECIFIED TRIGGER: Primary | ICD-10-CM

## 2018-06-11 DIAGNOSIS — J45.20 MILD INTERMITTENT ASTHMA WITHOUT COMPLICATION: ICD-10-CM

## 2018-06-11 PROCEDURE — 99213 OFFICE O/P EST LOW 20 MIN: CPT | Performed by: NURSE PRACTITIONER

## 2018-06-11 RX ORDER — CETIRIZINE HYDROCHLORIDE, PSEUDOEPHEDRINE HYDROCHLORIDE 5; 120 MG/1; MG/1
1 TABLET, FILM COATED, EXTENDED RELEASE ORAL DAILY
Qty: 30 TABLET | Refills: 0 | Status: SHIPPED | OUTPATIENT
Start: 2018-06-11 | End: 2018-07-17

## 2018-06-11 NOTE — MR AVS SNAPSHOT
After Visit Summary   6/11/2018    Fatimah Roy    MRN: [id number]           Patient Information     Date Of Birth          1990        Visit Information        Provider Department      6/11/2018 8:20 AM Yvonne Stringer NP [facility]        Today's Diagnoses     Chronic seasonal allergic rhinitis, unspecified trigger    -  1      Care Instructions    1. Chronic seasonal allergic rhinitis, unspecified trigger  Will try add a decongestant to the zyrtec   Sent with prescription but can also purchase OVER THE COUNTER   If not better in 2 weeks let me know, just call and will refer to ENT   flonase nasal spray may help too     - cetirizine-psuedoePHEDrine (ZYRTEC-D) 5-120 MG per 12 hr tablet; Take 1 tablet by mouth daily  Dispense: 30 tablet; Refill: 0          Follow-ups after your visit        Who to contact     If you have questions or need follow up information about today's clinic visit or your schedule please contact [facility] directly at [phone].  Normal or non-critical lab and imaging results will be communicated to you by [employer], letter or phone within 4 business days after the clinic has received the results. If you do not hear from us within 7 days, please contact the clinic through [employer] or phone. If you have a critical or abnormal lab result, we will notify you by phone as soon as possible.  Submit refill requests through [employer] or call your pharmacy and they will forward the refill request to us. Please allow 3 business days for your refill to be completed.          Additional Information About Your Visit        [employer] Information     [employer] gives you secure access to your electronic health record. If you see a primary care provider, you can also send messages to your care team and make appointments. If you have questions, please call your primary care clinic.  If you do not have a primary care provider, please call [phone] and they  will assist you.        Care EveryWhere ID     This is your Care EveryWhere ID. This could be used by other organizations to access your [facility] medical records  [id number]        Your Vitals Were     Pulse Temperature Respirations Last Period BMI (Body Mass Index)       88 97.7  F (36.5  C) (Tympanic) 16 05/29/2018 (Exact Date) 22.75 kg/m2        Blood Pressure from Last 3 Encounters:   06/11/18 100/72   03/26/18 106/80   12/11/17 122/60    Weight from Last 3 Encounters:   06/11/18 134 lb 9.6 oz (61.1 kg)   03/26/18 140 lb (63.5 kg)   12/11/17 157 lb (71.2 kg)              Today, you had the following     No orders found for display         Today's Medication Changes          These changes are accurate as of 6/11/18  8:43 AM.  If you have any questions, ask your nurse or doctor.               Start taking these medicines.        Dose/Directions    cetirizine-psuedoePHEDrine 5-120 MG per 12 hr tablet   Commonly known as:  zyrTEC-D   Used for:  Chronic seasonal allergic rhinitis, unspecified trigger   Started by:  Yvonne Stringer, NP        Dose:  1 tablet   Take 1 tablet by mouth daily   Quantity:  30 tablet   Refills:  0            Where to get your medicines      Some of these will need a paper prescription and others can be bought over the counter.  Ask your nurse if you have questions.     Bring a paper prescription for each of these medications     cetirizine-psuedoePHEDrine 5-120 MG per 12 hr tablet                Primary Care Provider Office Phone # Fax #    Pedro Luis Ur MD [facility] [phone]       [address] [facility] [address]        Equal Access to Services     HAYES SHAW AH: Hadbam Blackwell, waaxda lumary ellen, qaybta kaalerika herman. So [facility] [place] [facility] [phone].    ATENCIÓN: Si habla español, tiene a barrios disposición servicios gratuitos de asistencia lingüística. Llame al [phone].    We comply with applicable federal civil rights  laws and [place] laws. We do not discriminate on the basis of race, color, national origin, age, disability, sex, sexual orientation, or gender identity.            Thank you!     Thank you for choosing [facility]  for your care. Our goal is always to provide you with excellent care. Hearing back from our patients is one way we can continue to improve our services. Please take a few minutes to complete the written survey that you may receive in the mail after your visit with us. Thank you!             Your Updated Medication List - Protect others around you: Learn how to safely use, store and throw away your medicines at www.disposemymeds.org.          This list is accurate as of 6/11/18  8:43 AM.  Always use your most recent med list.                   Brand Name Dispense Instructions for use Diagnosis    albuterol 108 (90 Base) MCG/ACT Inhaler    PROAIR HFA/PROVENTIL HFA/VENTOLIN HFA    1 Inhaler    Inhale 2 puffs into the lungs every 6 hours as needed for shortness of breath / dyspnea or wheezing    Moderate persistent asthma without complication       cetirizine 10 MG tablet    zyrTEC     Take 10 mg by mouth daily        cetirizine-psuedoePHEDrine 5-120 MG per 12 hr tablet    zyrTEC-D    30 tablet    Take 1 tablet by mouth daily    Chronic seasonal allergic rhinitis, unspecified trigger

## 2018-06-11 NOTE — PROGRESS NOTES
SUBJECTIVE:   Fatimah Roy is a 28 year old female who presents to clinic today for the following health issues:    Concern - Ear problem  Onset: x 1 month off and on    Description:   Patient states she is not sure what ear is causing the issue but she states her ears feel like they need to pop. She has been slightly dizzy, and her balance is off. Patient also states she is having troubles hearing. She states they sound plugged. No ear pain. No fever.    Intensity: no pain    Progression of Symptoms:  same    Accompanying Signs & Symptoms:  no    Previous history of similar problem:   no    Therapies Tried and outcome: nothing      Not sure if it is both ears   Comes and goes   Notices equilibrium is off especially with walking down stairs    Asthma tests reviewed:   Asthma Control Test (ACT) > or equal to 20  Asthma - intermittent      Problem list and histories reviewed & adjusted, as indicated.  Additional history: as documented    Patient Active Problem List   Diagnosis     CARDIOVASCULAR SCREENING; LDL GOAL LESS THAN 160     Intermittent asthma     Seasonal allergic rhinitis     Chronic allergic conjunctivitis     Prenatal care, first pregnancy     S/P      Past Surgical History:   Procedure Laterality Date      SECTION N/A 3/24/2017    Procedure:  SECTION;  Surgeon: Renetta Saeed MD;  Location: [facility] OR     ESOPHAGOSCOPY, GASTROSCOPY, DUODENOSCOPY (EGD), COMBINED  2012    Procedure: COMBINED ESOPHAGOSCOPY, GASTROSCOPY, DUODENOSCOPY (EGD);  Gastroscopy;  Surgeon: Pete Arciniega MD;  Location: [facility] GI     GYN SURGERY  3/24/17         MOUTH SURGERY      wisdom teeth       Social History   Substance Use Topics     Smoking status: Never Smoker     Smokeless tobacco: Never Used     Alcohol use 0.0 oz/week     0 Standard drinks or equivalent per week      Comment: rare- quit with pregnancy     Family History   Problem Relation Age of Onset     CANCER Mother       cervical      Hypertension Mother      GASTROINTESTINAL DISEASE Mother      diverticulitis     Allergy (Severe) Mother      Bee Venom and Fire Ant     Other Cancer Mother      Cervical Cancer     OSTEOPOROSIS Mother      HEART DISEASE Maternal Grandmother      MI     MENTAL ILLNESS Maternal Grandmother      Paranoia Dementia     HEART DISEASE Paternal Grandfather      MI     Hepatitis Father      Substance Abuse Father      Sober for 15 years     Depression Father      C.A.D. No family hx of      DIABETES No family hx of      CEREBROVASCULAR DISEASE No family hx of      Breast Cancer No family hx of      Cancer - colorectal No family hx of            Reviewed and updated as needed this visit by clinical staff       Reviewed and updated as needed this visit by Provider         ROS:  Constitutional, HEENT, cardiovascular, pulmonary, gi and gu systems are negative, except as otherwise noted.    OBJECTIVE:                                                    /72  Pulse 88  Temp 97.7  F (36.5  C) (Tympanic)  Resp 16  Wt 134 lb 9.6 oz (61.1 kg)  LMP 05/29/2018 (Exact Date)  BMI 22.75 kg/m2  Body mass index is 22.75 kg/(m^2).  GENERAL APPEARANCE: healthy, alert and no distress  HENT: ear canals and TM's normal, nose and mouth without ulcers or lesions and TM congested/bulging bilateral  RESP: very faint expiratory wheezes L lower posterior  CV: regular rates and rhythm, normal S1 S2, no S3 or S4 and no murmur, click or rub  ABDOMEN: soft, nontender, without hepatosplenomegaly or masses and bowel sounds normal  MS: extremities normal- no gross deformities noted  SKIN: no suspicious lesions or rashes  PSYCH: mentation appears normal and affect normal/bright    Diagnostic test results:  Diagnostic Test Results:  none      ASSESSMENT/PLAN:                                                    1. Chronic seasonal allergic rhinitis, unspecified trigger  Suspect source of ear symptoms- recommended add a decongestant to her  anti histamine   She will call if not improved in 2 weeks and would refer to ENT     - cetirizine-psuedoePHEDrine (ZYRTEC-D) 5-120 MG per 12 hr tablet; Take 1 tablet by mouth daily  Dispense: 30 tablet; Refill: 0    2. Mild intermittent asthma without complication  Well controlled     Patient Instructions   1. Chronic seasonal allergic rhinitis, unspecified trigger  Will try add a decongestant to the zyrtec   Sent with prescription but can also purchase OVER THE COUNTER   If not better in 2 weeks let me know, just call and will refer to ENT   flonase nasal spray may help too     - cetirizine-psuedoePHEDrine (ZYRTEC-D) 5-120 MG per 12 hr tablet; Take 1 tablet by mouth daily  Dispense: 30 tablet; Refill: 0      Yvonne Stringer NP  [facility]

## 2018-06-11 NOTE — PATIENT INSTRUCTIONS
1. Chronic seasonal allergic rhinitis, unspecified trigger  Will try add a decongestant to the zyrtec   Sent with prescription but can also purchase OVER THE COUNTER   If not better in 2 weeks let me know, just call and will refer to ENT   flonase nasal spray may help too     - cetirizine-psuedoePHEDrine (ZYRTEC-D) 5-120 MG per 12 hr tablet; Take 1 tablet by mouth daily  Dispense: 30 tablet; Refill: 0

## 2018-06-12 ASSESSMENT — ASTHMA QUESTIONNAIRES: ACT_TOTALSCORE: 21

## 2018-07-05 ENCOUNTER — MYC MEDICAL ADVICE (OUTPATIENT)
Dept: FAMILY MEDICINE | Facility: CLINIC | Age: 28
End: 2018-07-05

## 2018-07-05 DIAGNOSIS — J30.2 CHRONIC SEASONAL ALLERGIC RHINITIS: Primary | ICD-10-CM

## 2018-07-05 NOTE — TELEPHONE ENCOUNTER
Per 06-11-18 OV-    1. Chronic seasonal allergic rhinitis, unspecified trigger  Suspect source of ear symptoms- recommended add a decongestant to her anti histamine   She will call if not improved in 2 weeks and would refer to ENT      - cetirizine-psuedoePHEDrine (ZYRTEC-D) 5-120 MG per 12 hr tablet; Take 1 tablet by mouth daily  Dispense: 30 tablet; Refill: 0    ENT referral placed. Pt informed via Sweetgreenhart.  LAM Sandoval RN

## 2018-07-17 ENCOUNTER — OFFICE VISIT (OUTPATIENT)
Dept: OTOLARYNGOLOGY | Facility: CLINIC | Age: 28
End: 2018-07-17
Payer: COMMERCIAL

## 2018-07-17 VITALS
SYSTOLIC BLOOD PRESSURE: 113 MMHG | TEMPERATURE: 98.8 F | HEIGHT: 65 IN | BODY MASS INDEX: 22.33 KG/M2 | DIASTOLIC BLOOD PRESSURE: 68 MMHG | HEART RATE: 85 BPM | WEIGHT: 134 LBS

## 2018-07-17 DIAGNOSIS — G25.3 STAPEDIAL MYOCLONUS: ICD-10-CM

## 2018-07-17 DIAGNOSIS — J30.2 CHRONIC SEASONAL ALLERGIC RHINITIS, UNSPECIFIED TRIGGER: Primary | ICD-10-CM

## 2018-07-17 PROCEDURE — 99243 OFF/OP CNSLTJ NEW/EST LOW 30: CPT | Performed by: OTOLARYNGOLOGY

## 2018-07-17 ASSESSMENT — PAIN SCALES - GENERAL: PAINLEVEL: NO PAIN (0)

## 2018-07-17 NOTE — LETTER
2018         RE: Fatimah Roy  [address] [facility] [address]        Dear Colleague,    Thank you for referring your patient, Fatimah Roy, to the [facility]. Please see a copy of my visit note below.        History of Present Illness - Fatimah Roy is a 28 year old female seen in consultation at the request of Yvonne Stringer NP for chronic seasonal allergic rhinitis. So far she has been treated with zyrtec.   She actually is presenting with a 3 month history of feeling her ear is plugged. The plugged sensation is resolved, but she has an intermittent ringing in the left ear. She had initially taken oral decongestants, which seemed to help the plugged sensation, but then she started developing the low pitched ringing in the left ear. Her mother suffers from blepharospasm.    Past Medical History -   Patient Active Problem List   Diagnosis     CARDIOVASCULAR SCREENING; LDL GOAL LESS THAN 160     Intermittent asthma     Seasonal allergic rhinitis     Chronic allergic conjunctivitis     Prenatal care, first pregnancy     S/P        Current Medications -   Current Outpatient Prescriptions:      albuterol (PROAIR HFA/PROVENTIL HFA/VENTOLIN HFA) 108 (90 BASE) MCG/ACT Inhaler, Inhale 2 puffs into the lungs every 6 hours as needed for shortness of breath / dyspnea or wheezing (Patient not taking: Reported on 2018), Disp: 1 Inhaler, Rfl: 1     cetirizine (ZYRTEC) 10 MG tablet, Take 10 mg by mouth daily, Disp: , Rfl:      cetirizine-psuedoePHEDrine (ZYRTEC-D) 5-120 MG per 12 hr tablet, Take 1 tablet by mouth daily, Disp: 30 tablet, Rfl: 0    Allergies -   Allergies   Allergen Reactions     Nkda [No Known Drug Allergies]        Social History -   Social History     Social History     Marital status:      Spouse name: N/A     Number of children: N/A     Years of education: N/A     Social History Main Topics     Smoking status: Never Smoker     Smokeless  tobacco: Never Used     Alcohol use 0.0 oz/week     0 Standard drinks or equivalent per week      Comment: rare- quit with pregnancy     Drug use: No     Sexual activity: Yes     Partners: Male     Birth control/ protection: Pill     Other Topics Concern     Parent/Sibling W/ Cabg, Mi Or Angioplasty Before 65f 55m? No     Social History Narrative    August 18, 2017        ENVIRONMENTAL HISTORY: [place] is heated with a forced air and gas furnace. They does have central air conditioning. The patient's bedroom is furnished with feather/wool bedding or pillows, carpeting in bedroom and fabric window coverings.  Pets inside the house include 1 cat and 1 dog. There is not history of cockroach or mice infestation. There are no smokers in the house.  The house does not have a damp basement.            Family History -   Family History   Problem Relation Age of Onset     Cancer Mother      cervical      Hypertension Mother      GASTROINTESTINAL DISEASE Mother      diverticulitis     Allergy (Severe) Mother      Bee Venom and Fire Ant     Other Cancer Mother      Cervical Cancer     Osteoperosis Mother      HEART DISEASE Maternal Grandmother      MI     Mental Illness Maternal Grandmother      Paranoia Dementia     HEART DISEASE Paternal Grandfather      MI     Hepatitis Father      Substance Abuse Father      Sober for 15 years     Depression Father      C.A.D. No family hx of      Diabetes No family hx of      Cerebrovascular Disease No family hx of      Breast Cancer No family hx of      Cancer - colorectal No family hx of        Review of Systems - As per HPI and PMHx, otherwise 7 system review of the head and neck negative. 10+ system review negative.    Physical Exam  There were no vitals taken for this visit.  General - The patient is well nourished and well developed, and appears to have good nutritional status.  Alert and oriented to person and place, answers  questions and cooperates with examination appropriately.   Head and Face - Normocephalic and atraumatic, with no gross asymmetry noted of the contour of the facial features.  The facial nerve is intact, with strong symmetric movements.  Voice and Breathing - The patient was breathing comfortably without the use of accessory muscles. There was no wheezing, stridor, or stertor.  The patients voice was clear and strong, and had appropriate pitch and quality.  Ears - Bilateral pinna and EACs with normal appearing overlying skin. Tympanic membrane intact with good mobility on pneumatic otoscopy bilaterally. Bony landmarks of the ossicular chain are normal. The tympanic membranes are normal in appearance. No retraction, perforation, or masses.  No fluid or purulence was seen in the external canal or the middle ear.   Eyes - Extraocular movements intact.  Sclera were not icteric or injected, conjunctiva were pink and moist.  Mouth - Examination of the oral cavity showed pink, healthy oral mucosa. No lesions or ulcerations noted.  The tongue was mobile and midline, and the dentition were in good condition.    Throat - The walls of the oropharynx were smooth, pink, moist, symmetric, and had no lesions or ulcerations.  The tonsillar pillars and soft palate were symmetric.  The uvula was midline on elevation.  Neck - Normal midline excursion of the laryngotracheal complex during swallowing.  Full range of motion on passive movement.  Palpation of the occipital, submental, submandibular, internal jugular chain, and supraclavicular nodes did not demonstrate any abnormal lymph nodes or masses.  The carotid pulse was palpable bilaterally.  Palpation of the thyroid was soft and smooth, with no nodules or goiter appreciated.  The trachea was mobile and midline.  Nose - External contour is symmetric, no gross deflection or scars.  Nasal mucosa is pink and moist with no abnormal mucus.  The septum was midline and non-obstructive,  turbinates of normal size and position.  No polyps, masses, or purulence noted on examination.          Assessment - Fatimah Roy is a 28 year old female with left stapedial myoclonus. I suspect this was brought on by decongestant use, but I also cautioned her to avoid caffiene. I reassured her that her ear exam is normal. If there is future concern for ear fullness, I advised her to autoinsufflate the ears, and return for an audiogram if the fullness persists.       Dr. Omaira Rivers MD  Otolaryngology  [facility]        Again, thank you for allowing me to participate in the care of your patient.        Sincerely,        Omaira Rivers MD

## 2018-07-17 NOTE — PROGRESS NOTES
History of Present Illness - Fatimah Roy is a 28 year old female seen in consultation at the request of Yvonne Stringer NP for chronic seasonal allergic rhinitis. So far she has been treated with zyrtec.   She actually is presenting with a 3 month history of feeling her ear is plugged. The plugged sensation is resolved, but she has an intermittent ringing in the left ear. She had initially taken oral decongestants, which seemed to help the plugged sensation, but then she started developing the low pitched ringing in the left ear. Her mother suffers from blepharospasm.    Past Medical History -   Patient Active Problem List   Diagnosis     CARDIOVASCULAR SCREENING; LDL GOAL LESS THAN 160     Intermittent asthma     Seasonal allergic rhinitis     Chronic allergic conjunctivitis     Prenatal care, first pregnancy     S/P        Current Medications -   Current Outpatient Prescriptions:      albuterol (PROAIR HFA/PROVENTIL HFA/VENTOLIN HFA) 108 (90 BASE) MCG/ACT Inhaler, Inhale 2 puffs into the lungs every 6 hours as needed for shortness of breath / dyspnea or wheezing (Patient not taking: Reported on 2018), Disp: 1 Inhaler, Rfl: 1     cetirizine (ZYRTEC) 10 MG tablet, Take 10 mg by mouth daily, Disp: , Rfl:      cetirizine-psuedoePHEDrine (ZYRTEC-D) 5-120 MG per 12 hr tablet, Take 1 tablet by mouth daily, Disp: 30 tablet, Rfl: 0    Allergies -   Allergies   Allergen Reactions     Nkda [No Known Drug Allergies]        Social History -   Social History     Social History     Marital status:      Spouse name: N/A     Number of children: N/A     Years of education: N/A     Social History Main Topics     Smoking status: Never Smoker     Smokeless tobacco: Never Used     Alcohol use 0.0 oz/week     0 Standard drinks or equivalent per week      Comment: rare- quit with pregnancy     Drug use: No     Sexual activity: Yes     Partners: Male     Birth control/ protection: Pill     Other Topics  Concern     Parent/Sibling W/ Cabg, Mi Or Angioplasty Before 65f 55m? No     Social History Narrative    August 18, 2017        ENVIRONMENTAL HISTORY: [place] The home is heated with a forced air and gas furnace. They does have central air conditioning. The patient's bedroom is furnished with feather/wool bedding or pillows, carpeting in bedroom and fabric window coverings.  Pets inside the house include 1 cat and 1 dog. There is not history of cockroach or mice infestation. There are no smokers in the house.  The house does not have a damp basement.            Family History -   Family History   Problem Relation Age of Onset     Cancer Mother      cervical      Hypertension Mother      GASTROINTESTINAL DISEASE Mother      diverticulitis     Allergy (Severe) Mother      Bee Venom and Fire Ant     Other Cancer Mother      Cervical Cancer     Osteoperosis Mother      HEART DISEASE Maternal Grandmother      MI     Mental Illness Maternal Grandmother      Paranoia Dementia     HEART DISEASE Paternal Grandfather      MI     Hepatitis Father      Substance Abuse Father      Sober for 15 years     Depression Father      C.A.D. No family hx of      Diabetes No family hx of      Cerebrovascular Disease No family hx of      Breast Cancer No family hx of      Cancer - colorectal No family hx of        Review of Systems - As per HPI and PMHx, otherwise 7 system review of the head and neck negative. 10+ system review negative.    Physical Exam  There were no vitals taken for this visit.  General - The patient is well nourished and well developed, and appears to have good nutritional status.  Alert and oriented to person and place, answers questions and cooperates with examination appropriately.   Head and Face - Normocephalic and atraumatic, with no gross asymmetry noted of the contour of the facial features.  The facial nerve is intact, with strong symmetric movements.  Voice and  Breathing - The patient was breathing comfortably without the use of accessory muscles. There was no wheezing, stridor, or stertor.  The patients voice was clear and strong, and had appropriate pitch and quality.  Ears - Bilateral pinna and EACs with normal appearing overlying skin. Tympanic membrane intact with good mobility on pneumatic otoscopy bilaterally. Bony landmarks of the ossicular chain are normal. The tympanic membranes are normal in appearance. No retraction, perforation, or masses.  No fluid or purulence was seen in the external canal or the middle ear.   Eyes - Extraocular movements intact.  Sclera were not icteric or injected, conjunctiva were pink and moist.  Mouth - Examination of the oral cavity showed pink, healthy oral mucosa. No lesions or ulcerations noted.  The tongue was mobile and midline, and the dentition were in good condition.    Throat - The walls of the oropharynx were smooth, pink, moist, symmetric, and had no lesions or ulcerations.  The tonsillar pillars and soft palate were symmetric.  The uvula was midline on elevation.  Neck - Normal midline excursion of the laryngotracheal complex during swallowing.  Full range of motion on passive movement.  Palpation of the occipital, submental, submandibular, internal jugular chain, and supraclavicular nodes did not demonstrate any abnormal lymph nodes or masses.  The carotid pulse was palpable bilaterally.  Palpation of the thyroid was soft and smooth, with no nodules or goiter appreciated.  The trachea was mobile and midline.  Nose - External contour is symmetric, no gross deflection or scars.  Nasal mucosa is pink and moist with no abnormal mucus.  The septum was midline and non-obstructive, turbinates of normal size and position.  No polyps, masses, or purulence noted on examination.          Assessment - Fatimah Roy is a 28 year old female with left stapedial myoclonus. I suspect this was brought on by decongestant use, but I also  cautioned her to avoid caffiene. I reassured her that her ear exam is normal. If there is future concern for ear fullness, I advised her to autoinsufflate the ears, and return for an audiogram if the fullness persists.       Dr. Omaira Rivers MD  Otolaryngology  [facility]

## 2018-07-17 NOTE — MR AVS SNAPSHOT
"              After Visit Summary   7/17/2018    Fatimah Roy    MRN: [id number]           Patient Information     Date Of Birth          1990        Visit Information        Provider Department      7/17/2018 1:45 PM Omaria Rivers MD [facility]        Today's Diagnoses     Chronic seasonal allergic rhinitis, unspecified trigger    -  1    Stapedial myoclonus          Care Instructions    Per Physician's instructions            Follow-ups after your visit        Who to contact     If you have questions or need follow up information about today's clinic visit or your schedule please contact [facility] directly at [phone].  Normal or non-critical lab and imaging results will be communicated to you by LoyaltyLionhart, letter or phone within 4 business days after the clinic has received the results. If you do not hear from us within 7 days, please contact the clinic through Loyal[employer]Lionhart or phone. If you have a critical or abnormal lab result, we will notify you by phone as soon as possible.  Submit refill requests through [employer] or call your pharmacy and they will forward the refill request to us. Please allow 3 business days for your refill to be completed.          Additional Information About Your Visit        MyChart Information     [employer] gives you secure access to your electronic health record. If you see a primary care provider, you can also send messages to your care team and make appointments. If you have questions, please call your primary care clinic.  If you do not have a primary care provider, please call [phone] and they will assist you.        Care EveryWhere ID     This is your Care EveryWhere ID. This could be used by other organizations to access your [place] medical records  [id number]        Your Vitals Were     Pulse Temperature Height BMI (Body Mass Index)          85 98.8  F (37.1  C) (Tympanic) 1.638 m (5' 4.5\") 22.65 kg/m2         Blood Pressure from " Last 3 Encounters:   07/17/18 113/68   06/11/18 100/72   03/26/18 106/80    Weight from Last 3 Encounters:   07/17/18 60.8 kg (134 lb)   06/11/18 61.1 kg (134 lb 9.6 oz)   03/26/18 63.5 kg (140 lb)              Today, you had the following     No orders found for display         Today's Medication Changes          These changes are accurate as of 7/17/18  2:53 PM.  If you have any questions, ask your nurse or doctor.               Stop taking these medicines if you haven't already. Please contact your care team if you have questions.     cetirizine-pseudoePHEDrine 5-120 MG per 12 hr tablet   Commonly known as:  zyrTEC-D   Stopped by:  Omaira Rivers MD                    Primary Care Provider Office Phone # Fax #    Pedro Luis Neal MD [facility] [phone]       [address] [id number]        Equal Access to Services     [facility]: Hadii betsy ku hadasho Soiván, waaxda luqadaha, qaybta kaalmada adeegyada, erika nicolasin kavon alva . So [facility] [place] [phone].    ATENCIÓN: Si koryla español, tiene a barrios disposición servicios gratuitos de asistencia lingüística. Llame al [phone].    We comply with applicable federal civil rights laws and [place] laws. We do not discriminate on the basis of race, color, national origin, age, disability, sex, sexual orientation, or gender identity.            Thank you!     Thank you for choosing [facility]  for your care. Our goal is always to provide you with excellent care. Hearing back from our patients is one way we can continue to improve our services. Please take a few minutes to complete the written survey that you may receive in the mail after your visit with us. Thank you!             Your Updated Medication List - Protect others around you: Learn how to safely use, store and throw away your medicines at www.disposemymeds.org.          This list is accurate as of 7/17/18  2:53 PM.  Always use your most recent med list.                    Brand Name Dispense Instructions for use Diagnosis    albuterol 108 (90 Base) MCG/ACT Inhaler    PROAIR HFA/PROVENTIL HFA/VENTOLIN HFA    1 Inhaler    Inhale 2 puffs into the lungs every 6 hours as needed for shortness of breath / dyspnea or wheezing    Moderate persistent asthma without complication       cetirizine 10 MG tablet    zyrTEC     Take 10 mg by mouth daily

## 2018-07-17 NOTE — NURSING NOTE
"Initial /68 (BP Location: Right arm, Patient Position: Sitting, Cuff Size: Adult Regular)  Pulse 85  Temp 98.8  F (37.1  C) (Tympanic)  Ht 1.638 m (5' 4.5\")  Wt 60.8 kg (134 lb)  BMI 22.65 kg/m2 Estimated body mass index is 22.65 kg/(m^2) as calculated from the following:    Height as of this encounter: 1.638 m (5' 4.5\").    Weight as of this encounter: 60.8 kg (134 lb). .    Nazia Dalton CMA    "

## 2018-08-20 ENCOUNTER — APPOINTMENT (OUTPATIENT)
Dept: OBGYN | Facility: CLINIC | Age: 28
End: 2018-08-20
Payer: COMMERCIAL

## 2018-08-20 ENCOUNTER — PRENATAL OFFICE VISIT (OUTPATIENT)
Dept: OBGYN | Facility: CLINIC | Age: 28
End: 2018-08-20

## 2018-08-20 DIAGNOSIS — Z34.80 PRENATAL CARE, SUBSEQUENT PREGNANCY: Primary | ICD-10-CM

## 2018-08-20 PROCEDURE — 99207 ZZC NO CHARGE NURSE ONLY: CPT | Performed by: OBSTETRICS & GYNECOLOGY

## 2018-08-20 RX ORDER — PRENATAL VIT/IRON FUM/FOLIC AC 27MG-0.8MG
1 TABLET ORAL DAILY
COMMUNITY
Start: 2018-07-20 | End: 2019-10-18

## 2018-08-20 NOTE — MR AVS SNAPSHOT
After Visit Summary   8/20/2018    Fatimah Roy    MRN: [id number]           Patient Information     Date Of Birth          1990        Visit Information        Provider Department      8/20/2018 5:12 PM Micky Cifuentes MD [facility]        Today's Diagnoses     Prenatal care, subsequent pregnancy    -  1       Follow-ups after your visit        Your next 10 appointments already scheduled     Aug 30, 2018  1:30 PM CDT   New Prenatal with Micky Cifuentes MD, [facility] RM 1   [facility]    [address] [address]   [phone]              Who to contact     If you have questions or need follow up information about today's clinic visit or your schedule please contact [facility] directly at [phone].  Normal or non-critical lab and imaging results will be communicated to you by MyChart, letter or phone within 4 business days after the clinic has received the results. If you do not hear from us within 7 days, please contact the clinic through MyChart or phone. If you have a critical or abnormal lab result, we will notify you by phone as soon as possible.  Submit refill requests through [employer] or call your pharmacy and they will forward the refill request to us. Please allow 3 business days for your refill to be completed.          Additional Information About Your Visit        MyChart Information     [employer] gives you secure access to your electronic health record. If you see a primary care provider, you can also send messages to your care team and make appointments. If you have questions, please call your primary care clinic.  If you do not have a primary care provider, please call [phone] and they will assist you.        Care EveryWhere ID     This is your Care EveryWhere ID. This could be used by other organizations to access your [place] medical records  [id number]         Your Vitals Were     Last Period                   06/28/2018            Blood Pressure from Last 3 Encounters:   07/17/18 113/68   06/11/18 100/72   03/26/18 106/80    Weight from Last 3 Encounters:   07/17/18 60.8 kg (134 lb)   06/11/18 61.1 kg (134 lb 9.6 oz)   03/26/18 63.5 kg (140 lb)              Today, you had the following     No orders found for display       Primary Care Provider Office Phone # Fax #    Pedro Luis Neal MD [place] [phone]       [address] [facility] [address]        Equal Access to Services     [facility]: Hadii aad ku hadasho Sobakariali, waaxda luqadaha, qaybta kaalmada adecrystalyada, erika alva . So [facility] [phone].    ATENCIÓN: Si habla español, tiene a barrios disposición servicios gratuitos de asistencia lingüística. Llame al [phone].    We comply with applicable federal civil rights laws and [place] laws. We do not discriminate on the basis of race, color, national origin, age, disability, sex, sexual orientation, or gender identity.            Thank you!     Thank you for choosing [facility]  for your care. Our goal is always to provide you with excellent care. Hearing back from our patients is one way we can continue to improve our services. Please take a few minutes to complete the written survey that you may receive in the mail after your visit with us. Thank you!             Your Updated Medication List - Protect others around you: Learn how to safely use, store and throw away your medicines at www.disposemymeds.org.          This list is accurate as of 8/20/18  5:20 PM.  Always use your most recent med list.                   Brand Name Dispense Instructions for use Diagnosis    albuterol 108 (90 Base) MCG/ACT inhaler    PROAIR HFA/PROVENTIL HFA/VENTOLIN HFA    1 Inhaler    Inhale 2 puffs into the lungs every 6 hours as needed for shortness of breath / dyspnea or wheezing    Moderate persistent asthma without  complication       cetirizine 10 MG tablet    zyrTEC     Take 10 mg by mouth daily        prenatal multivitamin plus iron 27-0.8 MG Tabs per tablet      Take 1 tablet by mouth daily

## 2018-08-30 ENCOUNTER — PRENATAL OFFICE VISIT (OUTPATIENT)
Dept: OBGYN | Facility: CLINIC | Age: 28
End: 2018-08-30
Payer: COMMERCIAL

## 2018-08-30 VITALS
BODY MASS INDEX: 22.31 KG/M2 | SYSTOLIC BLOOD PRESSURE: 119 MMHG | WEIGHT: 132 LBS | TEMPERATURE: 98.1 F | DIASTOLIC BLOOD PRESSURE: 84 MMHG | HEART RATE: 108 BPM

## 2018-08-30 DIAGNOSIS — Z34.91 PRENATAL CARE IN FIRST TRIMESTER: Primary | ICD-10-CM

## 2018-08-30 LAB
ABO + RH BLD: NORMAL
ABO + RH BLD: NORMAL
ALBUMIN UR-MCNC: ABNORMAL MG/DL
APPEARANCE UR: CLEAR
BILIRUB UR QL STRIP: NEGATIVE
BLD GP AB SCN SERPL QL: NORMAL
BLOOD BANK CMNT PATIENT-IMP: NORMAL
COLOR UR AUTO: YELLOW
ERYTHROCYTE [DISTWIDTH] IN BLOOD BY AUTOMATED COUNT: 12.6 % (ref 10–15)
GLUCOSE UR STRIP-MCNC: NEGATIVE MG/DL
HCT VFR BLD AUTO: 37.9 % (ref 35–47)
HGB BLD-MCNC: 13.2 G/DL (ref 11.7–15.7)
HGB UR QL STRIP: ABNORMAL
KETONES UR STRIP-MCNC: NEGATIVE MG/DL
LEUKOCYTE ESTERASE UR QL STRIP: NEGATIVE
MCH RBC QN AUTO: 31.7 PG (ref 26.5–33)
MCHC RBC AUTO-ENTMCNC: 34.8 G/DL (ref 31.5–36.5)
MCV RBC AUTO: 91 FL (ref 78–100)
MUCOUS THREADS #/AREA URNS LPF: PRESENT /LPF
NITRATE UR QL: NEGATIVE
NON-SQ EPI CELLS #/AREA URNS LPF: ABNORMAL /LPF
PH UR STRIP: 6 PH (ref 5–7)
PLATELET # BLD AUTO: 217 10E9/L (ref 150–450)
RBC # BLD AUTO: 4.16 10E12/L (ref 3.8–5.2)
RBC #/AREA URNS AUTO: ABNORMAL /HPF
SOURCE: ABNORMAL
SP GR UR STRIP: 1.02 (ref 1–1.03)
SPECIMEN EXP DATE BLD: NORMAL
UROBILINOGEN UR STRIP-ACNC: 0.2 EU/DL (ref 0.2–1)
WBC # BLD AUTO: 12.1 10E9/L (ref 4–11)
WBC #/AREA URNS AUTO: ABNORMAL /HPF

## 2018-08-30 PROCEDURE — 87086 URINE CULTURE/COLONY COUNT: CPT | Performed by: OBSTETRICS & GYNECOLOGY

## 2018-08-30 PROCEDURE — 87491 CHLMYD TRACH DNA AMP PROBE: CPT | Performed by: OBSTETRICS & GYNECOLOGY

## 2018-08-30 PROCEDURE — 85027 COMPLETE CBC AUTOMATED: CPT | Performed by: OBSTETRICS & GYNECOLOGY

## 2018-08-30 PROCEDURE — 86901 BLOOD TYPING SEROLOGIC RH(D): CPT | Performed by: OBSTETRICS & GYNECOLOGY

## 2018-08-30 PROCEDURE — 86900 BLOOD TYPING SEROLOGIC ABO: CPT | Performed by: OBSTETRICS & GYNECOLOGY

## 2018-08-30 PROCEDURE — 86762 RUBELLA ANTIBODY: CPT | Performed by: OBSTETRICS & GYNECOLOGY

## 2018-08-30 PROCEDURE — 86780 TREPONEMA PALLIDUM: CPT | Performed by: OBSTETRICS & GYNECOLOGY

## 2018-08-30 PROCEDURE — 87389 HIV-1 AG W/HIV-1&-2 AB AG IA: CPT | Performed by: OBSTETRICS & GYNECOLOGY

## 2018-08-30 PROCEDURE — 87340 HEPATITIS B SURFACE AG IA: CPT | Performed by: OBSTETRICS & GYNECOLOGY

## 2018-08-30 PROCEDURE — 99207 ZZC FIRST OB VISIT: CPT | Performed by: OBSTETRICS & GYNECOLOGY

## 2018-08-30 PROCEDURE — 81001 URINALYSIS AUTO W/SCOPE: CPT | Performed by: OBSTETRICS & GYNECOLOGY

## 2018-08-30 PROCEDURE — 87591 N.GONORRHOEAE DNA AMP PROB: CPT | Performed by: OBSTETRICS & GYNECOLOGY

## 2018-08-30 PROCEDURE — 76817 TRANSVAGINAL US OBSTETRIC: CPT | Performed by: OBSTETRICS & GYNECOLOGY

## 2018-08-30 PROCEDURE — 36415 COLL VENOUS BLD VENIPUNCTURE: CPT | Performed by: OBSTETRICS & GYNECOLOGY

## 2018-08-30 PROCEDURE — 86850 RBC ANTIBODY SCREEN: CPT | Performed by: OBSTETRICS & GYNECOLOGY

## 2018-08-30 NOTE — MR AVS SNAPSHOT
After Visit Summary   8/30/2018    Fatimah Roy    MRN: [id number]           Patient Information     Date Of Birth          1990        Visit Information        Provider Department      8/30/2018 1:30 PM Micky Cifuentes MD; [facility]        Today's Diagnoses     Prenatal care in first trimester    -  1       Follow-ups after your visit        Who to contact     If you have questions or need follow up information about today's clinic visit or your schedule please contact [facility] directly at [phone].  Normal or non-critical lab and imaging results will be communicated to you by MyChart, letter or phone within 4 business days after the clinic has received the results. If you do not hear from us within 7 days, please contact the clinic through [employer] or phone. If you have a critical or abnormal lab result, we will notify you by phone as soon as possible.  Submit refill requests through [employer] or call your pharmacy and they will forward the refill request to us. Please allow 3 business days for your refill to be completed.          Additional Information About Your Visit        MyChart Information     [employer] gives you secure access to your electronic health record. If you see a primary care provider, you can also send messages to your care team and make appointments. If you have questions, please call your primary care clinic.  If you do not have a primary care provider, please call [phone] and they will assist you.        Care EveryWhere ID     This is your Care EveryWhere ID. This could be used by other organizations to access your [place] medical records  [id number]        Your Vitals Were     Pulse Temperature Last Period Breastfeeding? BMI (Body Mass Index)       108 98.1  F (36.7  C) (Tympanic) 06/28/2018 No 22.31 kg/m2        Blood Pressure from Last 3 Encounters:   08/30/18 119/84   07/17/18 113/68   06/11/18 100/72     Weight from Last 3 Encounters:   08/30/18 132 lb (59.9 kg)   07/17/18 134 lb (60.8 kg)   06/11/18 134 lb 9.6 oz (61.1 kg)              We Performed the Following     *UA reflex to Microscopic     ABO/Rh type and screen     CBC with platelets     Chlamydia trachomatis PCR     Hepatitis B surface antigen     HIV Antigen Antibody Combo     Neisseria gonorrhoeae PCR     Rubella Antibody IgG Quantitative     Treponema Abs w Reflex to RPR and Titer     Urine Culture Aerobic Bacterial     Urine Microscopic     US OB <14 Weeks w Transvaginal Single        Primary Care Provider Office Phone # Fax #    Pedro Luisair Neal MD [place] [phone]       [address] [facility] [address]        Equal Access to Services     JE SHAW : Hadii betsy mo hadfaitho Soiván, waaxda luqadaha, qaybta kaalmada adecrystalyada, erika alva . So [facility] [phone].    ATENCIÓN: Si habla español, tiene a barrios disposición servicios gratuitos de asistencia lingüística. Llame al [phone].    We comply with applicable federal civil rights laws and [place] laws. We do not discriminate on the basis of race, color, national origin, age, disability, sex, sexual orientation, or gender identity.            Thank you!     Thank you for choosing [facility]  for your care. Our goal is always to provide you with excellent care. Hearing back from our patients is one way we can continue to improve our services. Please take a few minutes to complete the written survey that you may receive in the mail after your visit with us. Thank you!             Your Updated Medication List - Protect others around you: Learn how to safely use, store and throw away your medicines at www.disposemymeds.org.          This list is accurate as of 8/30/18  2:09 PM.  Always use your most recent med list.                   Brand Name Dispense Instructions for use Diagnosis    albuterol 108 (90 Base) MCG/ACT inhaler    PROAIR  HFA/PROVENTIL HFA/VENTOLIN HFA    1 Inhaler    Inhale 2 puffs into the lungs every 6 hours as needed for shortness of breath / dyspnea or wheezing    Moderate persistent asthma without complication       cetirizine 10 MG tablet    zyrTEC     Take 10 mg by mouth daily        prenatal multivitamin plus iron 27-0.8 MG Tabs per tablet      Take 1 tablet by mouth daily

## 2018-08-30 NOTE — PROGRESS NOTES
Fatimah is a 28 year old  @ 9w0d Patient's last menstrual period was 2018.  Here for new ob visit.    Reports oral fluid hydration but if excessive hydration then it will lead to nausea.  Vomited only a couple of times.   Denies vaginal bleeding, does endorse mild cramping.   Reports normal bladder and bowel habits.     ROS: Ten point review of systems was reviewed and negative except the above.      OBhx: C-sec x 1 for breech presentation; scheduled  section at 39w  Gyne: Denies hx of STIs or abnormal Pap smears    Past Medical History:   Diagnosis Date     Chickenpox      Uncomplicated asthma      Past Surgical History:   Procedure Laterality Date      SECTION N/A 3/24/2017    Procedure:  SECTION;  Surgeon: Renetta Saeed MD;  Location: [facility] OR     ESOPHAGOSCOPY, GASTROSCOPY, DUODENOSCOPY (EGD), COMBINED  2012    Procedure: COMBINED ESOPHAGOSCOPY, GASTROSCOPY, DUODENOSCOPY (EGD);  Gastroscopy;  Surgeon: Pete Arciniega MD;  Location: [facility] GI     MOUTH SURGERY      wisdom teeth     Patient Active Problem List    Diagnosis Date Noted     Prenatal care, subsequent pregnancy 2018     Priority: Medium     S/P  2017     Priority: Medium     Prenatal care, first pregnancy 2016     Priority: Medium     Seasonal allergic rhinitis 2011     Priority: Medium     Chronic allergic conjunctivitis 2011     Priority: Medium     Intermittent asthma 2011     Priority: Medium     CARDIOVASCULAR SCREENING; LDL GOAL LESS THAN 160 10/31/2010     Priority: Medium        Allergies   Allergen Reactions     Nkda [No Known Drug Allergies]        Current Outpatient Prescriptions on File Prior to Visit:  albuterol (PROAIR HFA/PROVENTIL HFA/VENTOLIN HFA) 108 (90 BASE) MCG/ACT Inhaler Inhale 2 puffs into the lungs every 6 hours as needed for shortness of breath / dyspnea or wheezing   cetirizine (ZYRTEC) 10 MG tablet Take 10 mg by mouth daily   Prenatal  Vit-Fe Fumarate-FA (PRENATAL MULTIVITAMIN PLUS IRON) 27-0.8 MG TABS per tablet Take 1 tablet by mouth daily     No current facility-administered medications on file prior to visit.     Past Medical History of Father of Baby:   No significant medical history    Physical Exam: /84 (BP Location: Left arm, Patient Position: Chair, Cuff Size: Adult Regular)  Pulse 108  Temp 98.1  F (36.7  C) (Tympanic)  Wt 132 lb (59.9 kg)  LMP 2018  Breastfeeding? No  BMI 22.31 kg/m2  General: Well developed, well nourished female  Skin: No lesions, Warm, moist and No cyanosis or pallor  HEENT: Atraumatic, normocephalic  Neck: Supple,no adenopathy,thyroid normal  Chest: Clear to auscultation  Heart: Regular rate, rhythm  Breasts: Deferred   Abdomen: Soft, flat, non-tender   Extremities: No cyanosis, clubbing, warm and well perfused and No edema  Neurological: Mental Status Normal and Station and Gait Normal   Perineum: Normal   Vulva: Normal genitalia and Bartholin's, Urethra, Oconto's normal  Vagina: Normal mucosa, no discharge  Cervix: Nulliparous, closed, mobile,  no discharge  Uterus: 8 weeks, Normal shape, position and consistency   Adnexa: No masses, nodularity, tenderness  Rectum: deferred.   Bony Pelvis: Adequate     Transvaginal ultrasound was performed.  A single live intrauterine pregnancy was seen.  CRL = 2.07 cm consistent with 8 weeks, 6 days.  Fetal heart motion was visualized at 183            EDC by LMP: 2019   EDC by sono:  2019  Final EDC: 2019    A/P 28 year old  at  9w0d by lmp c/w 8w6d US    1. Discussed physician coverage, tertiary support, diet, exercise, weight gain, schedule of visits, routine and indicated ultrasounds, and childbirth education.    2. Options for  testing for chromosomal and birth defects were discussed with the patient including nuchal lucency/blood marker testing in the first trimester and quad screening and/or Level 2 ultrasound in the second  trimester.  We discussed that these are screening tests and not diagnostic tests and that false positives and negatives are a distinct possibility.  We discussed that follow up diagnostic testing would include chorionic villus sampling or amniocentesis depending on gestational age. Declines genetic screening.     3. Prenatal labs, GC, Chlamydia    4. Hx of   - leaning towards a repeat   - reviewed TOLAC vs repeat  with thorough review of pros and cons with each option    5. Prenatal Vitamins encouraged    6. SAB precautions reviewed. RTC in 4 weeks    Micky Cifuentes MD  [facility]

## 2018-08-31 LAB
BACTERIA SPEC CULT: NORMAL
C TRACH DNA SPEC QL NAA+PROBE: NEGATIVE
HBV SURFACE AG SERPL QL IA: NONREACTIVE
HIV 1+2 AB+HIV1 P24 AG SERPL QL IA: NONREACTIVE
Lab: NORMAL
N GONORRHOEA DNA SPEC QL NAA+PROBE: NEGATIVE
RUBV IGG SERPL IA-ACNC: 16 IU/ML
SPECIMEN SOURCE: NORMAL
T PALLIDUM AB SER QL: NONREACTIVE

## 2018-09-04 NOTE — PROGRESS NOTES
Normal new OB prenatal labs.   Will review at next follow-up visit.       Micky Cifuentes MD  [facility]

## 2018-09-23 ENCOUNTER — MYC REFILL (OUTPATIENT)
Dept: FAMILY MEDICINE | Facility: CLINIC | Age: 28
End: 2018-09-23

## 2018-09-23 DIAGNOSIS — J45.40 MODERATE PERSISTENT ASTHMA WITHOUT COMPLICATION: ICD-10-CM

## 2018-09-24 RX ORDER — ALBUTEROL SULFATE 90 UG/1
2 AEROSOL, METERED RESPIRATORY (INHALATION) EVERY 6 HOURS PRN
Qty: 1 INHALER | Refills: 1 | Status: SHIPPED | OUTPATIENT
Start: 2018-09-24 | End: 2019-06-25

## 2018-09-24 NOTE — TELEPHONE ENCOUNTER
ACT Total Scores 8/9/2017 12/11/2017 6/11/2018   ACT TOTAL SCORE - - -   ASTHMA ER VISITS - - -   ASTHMA HOSPITALIZATIONS - - -   ACT TOTAL SCORE (Goal Greater than or Equal to 20) 14 21 21   In the past 12 months, how many times did you visit the emergency room for your asthma without being admitted to the hospital? 0 0 0   In the past 12 months, how many times were you hospitalized overnight because of your asthma? 0 0 0     Refilled.  LAM Sandoval RN

## 2018-09-24 NOTE — TELEPHONE ENCOUNTER
Message from [employer]:  Original authorizing provider: MD Fatimah Lino would like a refill of the following medications:  albuterol (PROAIR HFA/PROVENTIL HFA/VENTOLIN HFA) 108 (90 BASE) MCG/ACT Inhaler [Pedro Luis Leavitt MD]    Preferred pharmacy: [facility]    Comment:

## 2018-09-25 ENCOUNTER — PRENATAL OFFICE VISIT (OUTPATIENT)
Dept: OBGYN | Facility: CLINIC | Age: 28
End: 2018-09-25
Payer: COMMERCIAL

## 2018-09-25 VITALS
WEIGHT: 133 LBS | BODY MASS INDEX: 22.48 KG/M2 | DIASTOLIC BLOOD PRESSURE: 68 MMHG | TEMPERATURE: 97.4 F | HEART RATE: 87 BPM | SYSTOLIC BLOOD PRESSURE: 121 MMHG

## 2018-09-25 DIAGNOSIS — Z34.81 PRENATAL CARE, SUBSEQUENT PREGNANCY IN FIRST TRIMESTER: Primary | ICD-10-CM

## 2018-09-25 DIAGNOSIS — Z23 NEED FOR PROPHYLACTIC VACCINATION AND INOCULATION AGAINST INFLUENZA: ICD-10-CM

## 2018-09-25 PROCEDURE — 99207 ZZC PRENATAL VISIT: CPT | Performed by: OBSTETRICS & GYNECOLOGY

## 2018-09-25 PROCEDURE — 90686 IIV4 VACC NO PRSV 0.5 ML IM: CPT | Performed by: OBSTETRICS & GYNECOLOGY

## 2018-09-25 PROCEDURE — 90471 IMMUNIZATION ADMIN: CPT | Performed by: OBSTETRICS & GYNECOLOGY

## 2018-09-25 NOTE — MR AVS SNAPSHOT
After Visit Summary   9/25/2018    Fatimah Roy    MRN: [id number]           Patient Information     Date Of Birth          1990        Visit Information        Provider Department      9/25/2018 3:45 PM Micky Cifuentes MD [facility]        Today's Diagnoses     Prenatal care, subsequent pregnancy in first trimester    -  1    Need for prophylactic vaccination and inoculation against influenza           Follow-ups after your visit        Your next 10 appointments already scheduled     Oct 26, 2018  4:00 PM CDT   ESTABLISHED PRENATAL with Renetta Saeed MD   [facility]    [address] [facility] [address]   [phone]              Who to contact     If you have questions or need follow up information about today's clinic visit or your schedule please contact [facility] directly at [phone].  Normal or non-critical lab and imaging results will be communicated to you by MyChart, letter or phone within 4 business days after the clinic has received the results. If you do not hear from us within 7 days, please contact the clinic through [employer] or phone. If you have a critical or abnormal lab result, we will notify you by phone as soon as possible.  Submit refill requests through [employer] or call your pharmacy and they will forward the refill request to us. Please allow 3 business days for your refill to be completed.          Additional Information About Your Visit        MyChart Information     [employer] gives you secure access to your electronic health record. If you see a primary care provider, you can also send messages to your care team and make appointments. If you have questions, please call your primary care clinic.  If you do not have a primary care provider, please call [phone] and they will assist you.        Care EveryWhere ID     This is your Care EveryWhere ID. This could be used  by other organizations to access your [place] medical records  [id number]        Your Vitals Were     Pulse Temperature Last Period Breastfeeding? BMI (Body Mass Index)       87 97.4  F (36.3  C) (Tympanic) 06/28/2018 No 22.48 kg/m2        Blood Pressure from Last 3 Encounters:   09/25/18 121/68   08/30/18 119/84   07/17/18 113/68    Weight from Last 3 Encounters:   09/25/18 133 lb (60.3 kg)   08/30/18 132 lb (59.9 kg)   07/17/18 134 lb (60.8 kg)              We Performed the Following     FLU VACCINE, SPLIT VIRUS, IM (QUADRIVALENT) [31755]- >3 YRS     Vaccine Administration, Initial [40850]        Primary Care Provider Office Phone # Fax #    Pedro Luis Neal MD [place] [phone]       [address] [facility] [address]        Equal Access to Services     [facility] : Hadii betsy ku hadasho Soiván, waaxda luqadaha, qaybta kaalmada adecrystalyada, erika alva . So [facility] [phone].    ATENCIÓN: Si habla español, tiene a barrios disposición servicios gratuitos de asistencia lingüística. Yonas al [phone].    We comply with applicable federal civil rights laws and [place] laws. We do not discriminate on the basis of race, color, national origin, age, disability, sex, sexual orientation, or gender identity.            Thank you!     Thank you for choosing [facility]  for your care. Our goal is always to provide you with excellent care. Hearing back from our patients is one way we can continue to improve our services. Please take a few minutes to complete the written survey that you may receive in the mail after your visit with us. Thank you!             Your Updated Medication List - Protect others around you: Learn how to safely use, store and throw away your medicines at www.disposemymeds.org.          This list is accurate as of 9/25/18  5:00 PM.  Always use your most recent med list.                   Brand Name Dispense Instructions for use Diagnosis     albuterol 108 (90 Base) MCG/ACT inhaler    PROAIR HFA/PROVENTIL HFA/VENTOLIN HFA    1 Inhaler    Inhale 2 puffs into the lungs every 6 hours as needed for shortness of breath / dyspnea or wheezing    Moderate persistent asthma without complication       cetirizine 10 MG tablet    zyrTEC     Take 10 mg by mouth daily        prenatal multivitamin plus iron 27-0.8 MG Tabs per tablet      Take 1 tablet by mouth daily

## 2018-09-25 NOTE — PROGRESS NOTES
Doing well.   No complaints.   Denies VB, ctx, LOF. +FM  /68 (BP Location: Right arm, Patient Position: Chair, Cuff Size: Adult Regular)  Pulse 87  Temp 97.4  F (36.3  C) (Tympanic)  Wt 133 lb (60.3 kg)  LMP 2018  Breastfeeding? No  BMI 22.48 kg/m2  General Appearance: NAD  Abdomen: Gravid, NT  Refer to flow sheet above.   A/P: 28 year old  at 12w5d  -- reviewed normal new OB labs  -- SAB precautions reviewed  RTC in 4 weeks    Micky Cifuentes MD  [facility]

## 2018-09-25 NOTE — PROGRESS NOTES

## 2018-10-26 ENCOUNTER — PRENATAL OFFICE VISIT (OUTPATIENT)
Dept: OBGYN | Facility: CLINIC | Age: 28
End: 2018-10-26
Payer: COMMERCIAL

## 2018-10-26 VITALS
BODY MASS INDEX: 22.49 KG/M2 | TEMPERATURE: 97.6 F | RESPIRATION RATE: 16 BRPM | SYSTOLIC BLOOD PRESSURE: 103 MMHG | DIASTOLIC BLOOD PRESSURE: 63 MMHG | WEIGHT: 135 LBS | HEIGHT: 65 IN | HEART RATE: 80 BPM

## 2018-10-26 DIAGNOSIS — Z34.82 PRENATAL CARE, SUBSEQUENT PREGNANCY IN SECOND TRIMESTER: Primary | ICD-10-CM

## 2018-10-26 PROCEDURE — 99207 ZZC PRENATAL VISIT: CPT | Performed by: OBSTETRICS & GYNECOLOGY

## 2018-10-26 NOTE — NURSING NOTE
"Initial /63 (BP Location: Right arm, Patient Position: Chair, Cuff Size: Adult Regular)  Pulse 80  Temp 97.6  F (36.4  C) (Tympanic)  Resp 16  Ht 5' 4.5\" (1.638 m)  Wt 135 lb (61.2 kg)  LMP 06/28/2018  Breastfeeding? No  BMI 22.81 kg/m2 Estimated body mass index is 22.81 kg/(m^2) as calculated from the following:    Height as of this encounter: 5' 4.5\" (1.638 m).    Weight as of this encounter: 135 lb (61.2 kg). .    Brinda Rosa, ROBERT    "

## 2018-10-26 NOTE — MR AVS SNAPSHOT
After Visit Summary   10/26/2018    Fatimah Roy    MRN: [id number]           Patient Information     Date Of Birth          1990        Visit Information        Provider Department      10/26/2018 4:00 PM Renetta Saeed MD [facility]        Today's Diagnoses     Prenatal care, subsequent pregnancy in second trimester    -  1       Follow-ups after your visit        Future tests that were ordered for you today     Open Future Orders        Priority Expected Expires Ordered    US OB > 14 Weeks Routine  10/26/2019 10/26/2018            Who to contact     If you have questions or need follow up information about today's clinic visit or your schedule please contact [facility] directly at [phone].  Normal or non-critical lab and imaging results will be communicated to you by [employer], letter or phone within 4 business days after the clinic has received the results. If you do not hear from us within 7 days, please contact the clinic through [employer] or phone. If you have a critical or abnormal lab result, we will notify you by phone as soon as possible.  Submit refill requests through [employer] or call your pharmacy and they will forward the refill request to us. Please allow 3 business days for your refill to be completed.          Additional Information About Your Visit        MyChart Information     [employer] gives you secure access to your electronic health record. If you see a primary care provider, you can also send messages to your care team and make appointments. If you have questions, please call your primary care clinic.  If you do not have a primary care provider, please call [phone] and they will assist you.        Care EveryWhere ID     This is your Care EveryWhere ID. This could be used by other organizations to access your [place] medical records  [id number]        Your Vitals Were     Pulse Temperature Respirations Height Last Period  "Breastfeeding?    80 97.6  F (36.4  C) (Tympanic) 16 5' 4.5\" (1.638 m) 06/28/2018 No    BMI (Body Mass Index)                   22.81 kg/m2            Blood Pressure from Last 3 Encounters:   10/26/18 103/63   09/25/18 121/68   08/30/18 119/84    Weight from Last 3 Encounters:   10/26/18 135 lb (61.2 kg)   09/25/18 133 lb (60.3 kg)   08/30/18 132 lb (59.9 kg)               Primary Care Provider Office Phone # Fax #    Pedro Luis Neal MD [place] [phone]       [address] [id number]        Equal Access to Services     JE SHAW : Diana mcintyreo Soiván, waaxda luqadaha, qaybta kaalmada adeegyada, erika alva . So [facility] [phone].    ATENCIÓN: Si habla español, tiene a barrios disposición servicios gratuitos de asistencia lingüística. [facility] [phone].    We comply with applicable federal civil rights laws and [place] laws. We do not discriminate on the basis of race, color, national origin, age, disability, sex, sexual orientation, or gender identity.            Thank you!     Thank you for choosing [facility]  for your care. Our goal is always to provide you with excellent care. Hearing back from our patients is one way we can continue to improve our services. Please take a few minutes to complete the written survey that you may receive in the mail after your visit with us. Thank you!             Your Updated Medication List - Protect others around you: Learn how to safely use, store and throw away your medicines at www.disposemymeds.org.          This list is accurate as of 10/26/18  4:10 PM.  Always use your most recent med list.                   Brand Name Dispense Instructions for use Diagnosis    albuterol 108 (90 Base) MCG/ACT inhaler    PROAIR HFA/PROVENTIL HFA/VENTOLIN HFA    1 Inhaler    Inhale 2 puffs into the lungs every 6 hours as needed for shortness of breath / dyspnea or wheezing    Moderate persistent asthma without " complication       cetirizine 10 MG tablet    zyrTEC     Take 10 mg by mouth daily        prenatal multivitamin plus iron 27-0.8 MG Tabs per tablet      Take 1 tablet by mouth daily

## 2018-10-26 NOTE — PROGRESS NOTES
"CC: Here for routine prenatal visit @ 17w1d   HPI: ? FM, no cramping or bleeding.  No complaints.     PE: /63 (BP Location: Right arm, Patient Position: Chair, Cuff Size: Adult Regular)  Pulse 80  Temp 97.6  F (36.4  C) (Tympanic)  Resp 16  Ht 5' 4.5\" (1.638 m)  Wt 135 lb (61.2 kg)  LMP 2018  Breastfeeding? No  BMI 22.81 kg/m2   See OB flowsheet    A/P  @ 17w1d normal pregnancy    1. Routine prenatal care.  Genetic screening declined.  Anomaly screen ordered.     RTC 3-4 weeks.      Renetta Saeed M.D.    "

## 2018-11-16 ENCOUNTER — PRENATAL OFFICE VISIT (OUTPATIENT)
Dept: OBGYN | Facility: CLINIC | Age: 28
End: 2018-11-16
Payer: COMMERCIAL

## 2018-11-16 ENCOUNTER — HOSPITAL ENCOUNTER (OUTPATIENT)
Dept: ULTRASOUND IMAGING | Facility: CLINIC | Age: 28
Discharge: HOME | End: 2018-11-16
Attending: OBSTETRICS & GYNECOLOGY | Admitting: OBSTETRICS & GYNECOLOGY
Payer: COMMERCIAL

## 2018-11-16 VITALS
HEIGHT: 65 IN | DIASTOLIC BLOOD PRESSURE: 57 MMHG | RESPIRATION RATE: 16 BRPM | TEMPERATURE: 97.7 F | HEART RATE: 88 BPM | BODY MASS INDEX: 23.09 KG/M2 | SYSTOLIC BLOOD PRESSURE: 113 MMHG | WEIGHT: 138.6 LBS

## 2018-11-16 DIAGNOSIS — Z34.82 PRENATAL CARE, SUBSEQUENT PREGNANCY IN SECOND TRIMESTER: ICD-10-CM

## 2018-11-16 DIAGNOSIS — Z34.82 PRENATAL CARE, SUBSEQUENT PREGNANCY IN SECOND TRIMESTER: Primary | ICD-10-CM

## 2018-11-16 DIAGNOSIS — O44.20 MARGINAL PLACENTA PREVIA: ICD-10-CM

## 2018-11-16 PROCEDURE — 99207 ZZC PRENATAL VISIT: CPT | Performed by: OBSTETRICS & GYNECOLOGY

## 2018-11-16 PROCEDURE — 76805 OB US >/= 14 WKS SNGL FETUS: CPT

## 2018-11-16 NOTE — PROGRESS NOTES
"CC: Here for routine prenatal visit @ 20w1d   HPI: + FM, no ctx, no LOF, no VB.  No complaints.     PE: /57 (BP Location: Right arm, Patient Position: Chair, Cuff Size: Adult Regular)  Pulse 88  Temp 97.7  F (36.5  C) (Tympanic)  Resp 16  Ht 5' 4.5\" (1.638 m)  Wt 138 lb 9.6 oz (62.9 kg)  LMP 2018  Breastfeeding? No  BMI 23.42 kg/m2   See OB flowsheet    U/S: GIRL!  WNL, marginal previa    A/P  @ 20w1d normal pregnancy    1. Routine prenatal care    RTC 4 weeks.      Renetta Saeed M.D.    "

## 2018-11-16 NOTE — NURSING NOTE
"Initial /57 (BP Location: Right arm, Patient Position: Chair, Cuff Size: Adult Regular)  Pulse 88  Temp 97.7  F (36.5  C) (Tympanic)  Resp 16  Ht 5' 4.5\" (1.638 m)  Wt 138 lb 9.6 oz (62.9 kg)  LMP 06/28/2018  Breastfeeding? No  BMI 23.42 kg/m2 Estimated body mass index is 23.42 kg/(m^2) as calculated from the following:    Height as of this encounter: 5' 4.5\" (1.638 m).    Weight as of this encounter: 138 lb 9.6 oz (62.9 kg). .    Brinda Rosa, [facility]    "

## 2018-11-16 NOTE — MR AVS SNAPSHOT
After Visit Summary   11/16/2018    Fatimah Roy    MRN: [id number]           Patient Information     Date Of Birth          1990        Visit Information        Provider Department      11/16/2018 2:00 PM Renetta Saeed MD [facility]        Today's Diagnoses     Prenatal care, subsequent pregnancy in second trimester    -  1    Marginal placenta previa           Follow-ups after your visit        Future tests that were ordered for you today     Open Future Orders        Priority Expected Expires Ordered    Treponema Abs w Reflex to RPR and Titer Routine  3/16/2019 11/16/2018    Glucose tolerance gest screen 1 hour Routine  3/16/2019 11/16/2018    CBC with platelets Routine  3/16/2019 11/16/2018    US OB >14 Weeks Follow Up Routine  11/16/2019 11/16/2018            Who to contact     If you have questions or need follow up information about today's clinic visit or your schedule please contact [facility] directly at [phone].  Normal or non-critical lab and imaging results will be communicated to you by Dotspinhart, letter or phone within 4 business days after the clinic has received the results. If you do not hear from us within 7 days, please contact the clinic through Tra[employer]ix System[employer] or phone. If you have a critical or abnormal lab result, we will notify you by phone as soon as possible.  Submit refill requests through Cameo or call your pharmacy and they will forward the refill request to us. Please allow 3 business days for your refill to be completed.          Additional Information About Your Visit        Dotspinhart Information     Cameo gives you secure access to your electronic health record. If you see a primary care provider, you can also send messages to your care team and make appointments. If you have questions, please call your primary care clinic.  If you do not have a primary care provider, please call [phone] and they will assist you.        Care  "EveryWhere ID     This is your Care EveryWhere ID. This could be used by other organizations to access your [place] medical records  [id number]        Your Vitals Were     Pulse Temperature Respirations Height Last Period Breastfeeding?    88 97.7  F (36.5  C) (Tympanic) 16 5' 4.5\" (1.638 m) 06/28/2018 No    BMI (Body Mass Index)                   23.42 kg/m2            Blood Pressure from Last 3 Encounters:   11/16/18 113/57   10/26/18 103/63   09/25/18 121/68    Weight from Last 3 Encounters:   11/16/18 138 lb 9.6 oz (62.9 kg)   10/26/18 135 lb (61.2 kg)   09/25/18 133 lb (60.3 kg)               Primary Care Provider Office Phone # Fax #    Pedro Luis Neal MD [place] [phone]       [address] [facility] [address]        Equal Access to Services     JE SHAW : Hadii betsy mcintyreo Soiván, waaxda luqadaha, qaybta kaalmada adeegyada, erika alva . So [facility] [phone].    ATENCIÓN: Si habla español, tiene a barrios disposición servicios gratuitos de asistencia lingüística. Llame al [phone].    We comply with applicable federal civil rights laws and [place] laws. We do not discriminate on the basis of race, color, national origin, age, disability, sex, sexual orientation, or gender identity.            Thank you!     Thank you for choosing [facility]  for your care. Our goal is always to provide you with excellent care. Hearing back from our patients is one way we can continue to improve our services. Please take a few minutes to complete the written survey that you may receive in the mail after your visit with us. Thank you!             Your Updated Medication List - Protect others around you: Learn how to safely use, store and throw away your medicines at www.disposemymeds.org.          This list is accurate as of 11/16/18  2:13 PM.  Always use your most recent med list.                   Brand Name Dispense Instructions for use Diagnosis    " albuterol 108 (90 Base) MCG/ACT inhaler    PROAIR HFA/PROVENTIL HFA/VENTOLIN HFA    1 Inhaler    Inhale 2 puffs into the lungs every 6 hours as needed for shortness of breath / dyspnea or wheezing    Moderate persistent asthma without complication       cetirizine 10 MG tablet    zyrTEC     Take 10 mg by mouth daily        prenatal multivitamin plus iron 27-0.8 MG Tabs per tablet      Take 1 tablet by mouth daily

## 2018-12-14 ENCOUNTER — PRENATAL OFFICE VISIT (OUTPATIENT)
Dept: OBGYN | Facility: CLINIC | Age: 28
End: 2018-12-14
Payer: COMMERCIAL

## 2018-12-14 ENCOUNTER — HOSPITAL ENCOUNTER (OUTPATIENT)
Dept: ULTRASOUND IMAGING | Facility: CLINIC | Age: 28
Discharge: HOME | End: 2018-12-14
Attending: OBSTETRICS & GYNECOLOGY | Admitting: OBSTETRICS & GYNECOLOGY
Payer: COMMERCIAL

## 2018-12-14 VITALS
RESPIRATION RATE: 18 BRPM | WEIGHT: 145 LBS | DIASTOLIC BLOOD PRESSURE: 64 MMHG | SYSTOLIC BLOOD PRESSURE: 112 MMHG | HEIGHT: 65 IN | TEMPERATURE: 97.9 F | HEART RATE: 84 BPM | BODY MASS INDEX: 24.16 KG/M2

## 2018-12-14 DIAGNOSIS — O44.40 LOW-LYING PLACENTA: Primary | ICD-10-CM

## 2018-12-14 DIAGNOSIS — Z34.92 NORMAL PREGNANCY IN SECOND TRIMESTER: Primary | ICD-10-CM

## 2018-12-14 DIAGNOSIS — O44.40 LOW-LYING PLACENTA: ICD-10-CM

## 2018-12-14 DIAGNOSIS — R73.09 ABNORMAL GLUCOSE TOLERANCE TEST: ICD-10-CM

## 2018-12-14 DIAGNOSIS — Z34.82 PRENATAL CARE, SUBSEQUENT PREGNANCY IN SECOND TRIMESTER: Primary | ICD-10-CM

## 2018-12-14 DIAGNOSIS — Z34.82 PRENATAL CARE, SUBSEQUENT PREGNANCY IN SECOND TRIMESTER: ICD-10-CM

## 2018-12-14 DIAGNOSIS — O44.20 MARGINAL PLACENTA PREVIA: ICD-10-CM

## 2018-12-14 LAB
ERYTHROCYTE [DISTWIDTH] IN BLOOD BY AUTOMATED COUNT: 12.7 % (ref 10–15)
GLUCOSE 1H P 50 G GLC PO SERPL-MCNC: 171 MG/DL (ref 60–129)
HCT VFR BLD AUTO: 35.6 % (ref 35–47)
HGB BLD-MCNC: 11.9 G/DL (ref 11.7–15.7)
MCH RBC QN AUTO: 31.4 PG (ref 26.5–33)
MCHC RBC AUTO-ENTMCNC: 33.4 G/DL (ref 31.5–36.5)
MCV RBC AUTO: 94 FL (ref 78–100)
PLATELET # BLD AUTO: 209 10E9/L (ref 150–450)
RBC # BLD AUTO: 3.79 10E12/L (ref 3.8–5.2)
WBC # BLD AUTO: 11.9 10E9/L (ref 4–11)

## 2018-12-14 PROCEDURE — 99207 ZZC PRENATAL VISIT: CPT | Performed by: OBSTETRICS & GYNECOLOGY

## 2018-12-14 PROCEDURE — 86780 TREPONEMA PALLIDUM: CPT | Performed by: OBSTETRICS & GYNECOLOGY

## 2018-12-14 PROCEDURE — 36415 COLL VENOUS BLD VENIPUNCTURE: CPT | Performed by: OBSTETRICS & GYNECOLOGY

## 2018-12-14 PROCEDURE — 76816 OB US FOLLOW-UP PER FETUS: CPT

## 2018-12-14 PROCEDURE — 82950 GLUCOSE TEST: CPT | Performed by: OBSTETRICS & GYNECOLOGY

## 2018-12-14 PROCEDURE — 85027 COMPLETE CBC AUTOMATED: CPT | Performed by: OBSTETRICS & GYNECOLOGY

## 2018-12-14 ASSESSMENT — MIFFLIN-ST. JEOR: SCORE: 1375.66

## 2018-12-14 NOTE — PROGRESS NOTES
"CC: Here for routine prenatal visit @ 24w1d   HPI:  Elevated 1 hr glucose--ordered 3 hr GTT  Repeat sonogram today shows placenta continues to be ANTERIOR low lying, 0.5cm from int os; discussed with pt that concern about this position can be potential for abnormal placentation    PE: /64 (BP Location: Right arm, Patient Position: Chair, Cuff Size: Adult Small)   Pulse 84   Temp 97.9  F (36.6  C) (Tympanic)   Resp 18   Ht 1.638 m (5' 4.5\")   Wt 65.8 kg (145 lb)   LMP 06/28/2018   BMI 24.50 kg/m     See OB flowsheet      A:  1. Prenatal care, subsequent pregnancy in second trimester      2. Low-lying placenta  Will copy Dr. Saeed as pt desires she perform her repeat c/section    3. Abnormal glucose tolerance test        Routine prenatal care  RTC 4 weeks.      Yris Quezada M.D.     "

## 2018-12-14 NOTE — Clinical Note
Pt would like RCS on 3/29/19: NOTE: sonogram today showed ANTERIOR low lying placenta, 0.5cm from int os.  Does not comment if suspicious for placenta acreta; informed pt I will notify you for further disposition  ?MRI,  ?MFM consultYris Quezada

## 2018-12-15 LAB — T PALLIDUM AB SER QL: NONREACTIVE

## 2018-12-19 DIAGNOSIS — Z34.92 NORMAL PREGNANCY IN SECOND TRIMESTER: ICD-10-CM

## 2018-12-19 LAB
GLUCOSE 1H P 100 G GLC PO SERPL-MCNC: 144 MG/DL (ref 60–179)
GLUCOSE 2H P 100 G GLC PO SERPL-MCNC: 146 MG/DL (ref 60–154)
GLUCOSE 3H P 100 G GLC PO SERPL-MCNC: 88 MG/DL (ref 60–139)
GLUCOSE P FAST SERPL-MCNC: 79 MG/DL (ref 60–94)

## 2018-12-19 PROCEDURE — 36415 COLL VENOUS BLD VENIPUNCTURE: CPT | Performed by: OBSTETRICS & GYNECOLOGY

## 2018-12-19 PROCEDURE — 82952 GTT-ADDED SAMPLES: CPT | Performed by: OBSTETRICS & GYNECOLOGY

## 2018-12-19 PROCEDURE — 82951 GLUCOSE TOLERANCE TEST (GTT): CPT | Performed by: OBSTETRICS & GYNECOLOGY

## 2019-01-11 ENCOUNTER — HOSPITAL ENCOUNTER (OUTPATIENT)
Dept: ULTRASOUND IMAGING | Facility: CLINIC | Age: 29
Discharge: HOME | End: 2019-01-11
Attending: OBSTETRICS & GYNECOLOGY | Admitting: OBSTETRICS & GYNECOLOGY
Payer: COMMERCIAL

## 2019-01-11 ENCOUNTER — PRENATAL OFFICE VISIT (OUTPATIENT)
Dept: OBGYN | Facility: CLINIC | Age: 29
End: 2019-01-11
Payer: COMMERCIAL

## 2019-01-11 ENCOUNTER — TELEPHONE (OUTPATIENT)
Dept: OBGYN | Facility: CLINIC | Age: 29
End: 2019-01-11

## 2019-01-11 VITALS
SYSTOLIC BLOOD PRESSURE: 116 MMHG | RESPIRATION RATE: 16 BRPM | DIASTOLIC BLOOD PRESSURE: 78 MMHG | TEMPERATURE: 97 F | BODY MASS INDEX: 25.36 KG/M2 | WEIGHT: 152.2 LBS | HEIGHT: 65 IN | HEART RATE: 86 BPM

## 2019-01-11 DIAGNOSIS — Z34.82 PRENATAL CARE, SUBSEQUENT PREGNANCY IN SECOND TRIMESTER: ICD-10-CM

## 2019-01-11 DIAGNOSIS — O44.40 LOW-LYING PLACENTA: ICD-10-CM

## 2019-01-11 DIAGNOSIS — Z34.82 PRENATAL CARE, SUBSEQUENT PREGNANCY IN SECOND TRIMESTER: Primary | ICD-10-CM

## 2019-01-11 DIAGNOSIS — Z23 NEED FOR TDAP VACCINATION: ICD-10-CM

## 2019-01-11 PROCEDURE — 90715 TDAP VACCINE 7 YRS/> IM: CPT | Performed by: OBSTETRICS & GYNECOLOGY

## 2019-01-11 PROCEDURE — 76816 OB US FOLLOW-UP PER FETUS: CPT

## 2019-01-11 PROCEDURE — 90471 IMMUNIZATION ADMIN: CPT | Performed by: OBSTETRICS & GYNECOLOGY

## 2019-01-11 PROCEDURE — 99207 ZZC PRENATAL VISIT: CPT | Performed by: OBSTETRICS & GYNECOLOGY

## 2019-01-11 ASSESSMENT — MIFFLIN-ST. JEOR: SCORE: 1408.31

## 2019-01-11 NOTE — NURSING NOTE
"Initial /78 (BP Location: Left arm, Patient Position: Chair, Cuff Size: Adult Regular)   Pulse 86   Temp 97  F (36.1  C) (Tympanic)   Resp 16   Ht 1.638 m (5' 4.5\")   Wt 69 kg (152 lb 3.2 oz)   LMP 06/28/2018   Breastfeeding? No   BMI 25.72 kg/m   Estimated body mass index is 25.72 kg/m  as calculated from the following:    Height as of this encounter: 1.638 m (5' 4.5\").    Weight as of this encounter: 69 kg (152 lb 3.2 oz). .    Brinda Rosa, ROBERT    "

## 2019-01-11 NOTE — PROGRESS NOTES
Screening Questionnaire for Adult Immunization    Are you sick today?   No   Do you have allergies to medications, food, a vaccine component or latex?   No   Have you ever had a serious reaction after receiving a vaccination?   No   Do you have a long-term health problem with heart disease, lung disease, asthma, kidney disease, metabolic disease (e.g. diabetes), anemia, or other blood disorder?   Yes- asthma   Do you have cancer, leukemia, HIV/AIDS, or any other immune system problem?   No   In the past 3 months, have you taken medications that affect  your immune system, such as prednisone, other steroids, or anticancer drugs; drugs for the treatment of rheumatoid arthritis, Crohn s disease, or psoriasis; or have you had radiation treatments?   No   Have you had a seizure, or a brain or other nervous system problem?   No   During the past year, have you received a transfusion of blood or blood     products, or been given immune (gamma) globulin or antiviral drug?   No   For women: Are you pregnant or is there a chance you could become        pregnant during the next month?   No   Have you received any vaccinations in the past 4 weeks?   No            Per orders of Dr. Saeed, injection of TDAP given by Brinda Rosa. Patient instructed to remain in clinic for 15 minutes afterwards, and to report any adverse reaction to me immediately.       Screening performed by Brinda Rosa on 1/11/2019 at 3:02 PM.

## 2019-01-11 NOTE — TELEPHONE ENCOUNTER
"  You are now scheduled for surgery at [facility].  Below are the details for your surgery.  Please read the \"Preparing for Your Surgery\" instructions and let us know if you have any questions.    Type of surgery:  Delivery  Surgeon:  Renetta Saeed MD  Location of surgery: [facility] OR    Date of surgery: 3-29-19    Time: 9:00am   Arrival Time: 7:30am [facility]    Time can change, to be confirmed a couple of days prior by pre-op surgery nurse.    Pre-Op Appt Date: Last ob visit  Post-Op Appt Date: To be determined by provider     Packet sent out: Yes  Pre-cert/Authorization completed:  TBD by Financial Securing Office.   MA Sterilization/Hysterectomy Acknowledgment Consent signed: Not Applicable    [facility]  [phone]    Fax: [phone]  Same Day Surgery [phone]  Fax: [phone]  [facility] [phone]      "

## 2019-01-11 NOTE — PROGRESS NOTES
"CC: Here for routine prenatal visit @ 28w1d   HPI: + FM, occasional and irregular contraction--mostly in the evening, no LOF.  Intermittent spotting--sporadic in the last few weeks.  No complaints.     PE: /78 (BP Location: Left arm, Patient Position: Chair, Cuff Size: Adult Regular)   Pulse 86   Temp 97  F (36.1  C) (Tympanic)   Resp 16   Ht 1.638 m (5' 4.5\")   Wt 69 kg (152 lb 3.2 oz)   LMP 2018   Breastfeeding? No   BMI 25.72 kg/m     See OB flowsheet    U/S: placenta 1.3 cm from os    A/P  @ 28w1d normal pregnancy    1. Routine prenatal care. Tdap given.    2. LLP: will reassess in 4 weeks    RTC 4 weeks.      Renetta Saeed M.D.    "

## 2019-01-14 ENCOUNTER — PRE VISIT (OUTPATIENT)
Dept: MATERNAL FETAL MEDICINE | Facility: CLINIC | Age: 29
End: 2019-01-14

## 2019-01-15 ENCOUNTER — TELEPHONE (OUTPATIENT)
Dept: OBGYN | Facility: CLINIC | Age: 29
End: 2019-01-15

## 2019-01-15 ENCOUNTER — MYC MEDICAL ADVICE (OUTPATIENT)
Dept: OBGYN | Facility: CLINIC | Age: 29
End: 2019-01-15

## 2019-01-15 NOTE — TELEPHONE ENCOUNTER
Reason for Call:  Form, our goal is to have forms completed with 72 hours, however, some forms may require a visit or additional information.    Type of letter, form or note:  FMLA    Who is the form from?: Patient    Where did the form come from: Patient or family brought in       What clinic location was the form placed at?: [facility]    Where the form was placed: Filled out and given to Dr. Saeed to sign    What number is listed as a contact on the form?: [phone]         Additional comments: will fax to [phone] and mail to Faina Hardy [address] [place] [phone]    Call taken on 1/15/2019 at 10:46 AM by Alaina Matias

## 2019-01-16 ENCOUNTER — OFFICE VISIT (OUTPATIENT)
Dept: MATERNAL FETAL MEDICINE | Facility: CLINIC | Age: 29
End: 2019-01-16
Attending: SOCIAL WORKER
Payer: COMMERCIAL

## 2019-01-16 ENCOUNTER — HOSPITAL ENCOUNTER (OUTPATIENT)
Dept: ULTRASOUND IMAGING | Facility: CLINIC | Age: 29
Discharge: HOME | End: 2019-01-16
Attending: OBSTETRICS & GYNECOLOGY | Admitting: OBSTETRICS & GYNECOLOGY
Payer: COMMERCIAL

## 2019-01-16 DIAGNOSIS — O26.90 PREGNANCY RELATED CONDITION, ANTEPARTUM: ICD-10-CM

## 2019-01-16 DIAGNOSIS — O44.43 LOW LYING PLACENTA NOS OR WITHOUT HEMORRHAGE, THIRD TRIMESTER: Primary | ICD-10-CM

## 2019-01-16 PROCEDURE — 76817 TRANSVAGINAL US OBSTETRIC: CPT | Performed by: OBSTETRICS & GYNECOLOGY

## 2019-01-16 PROCEDURE — 76811 OB US DETAILED SNGL FETUS: CPT

## 2019-01-16 NOTE — TELEPHONE ENCOUNTER
Paper work rec'd on 1-11-19 was filled out and faxed and mailed.  Sent to be scanned.  Copy put up front.    -Alaina Matias  [facility]

## 2019-01-18 NOTE — PROGRESS NOTES
"Please see \"Imaging\" tab under \"Chart Review\" for details of today's US.    Gracie Chavez, DO    "

## 2019-01-21 ENCOUNTER — HOSPITAL ENCOUNTER (OUTPATIENT)
Facility: CLINIC | Age: 29
LOS: 1 days | Discharge: HOME | End: 2019-01-21
Attending: OBSTETRICS & GYNECOLOGY | Admitting: OBSTETRICS & GYNECOLOGY
Payer: COMMERCIAL

## 2019-01-21 VITALS
TEMPERATURE: 97.9 F | DIASTOLIC BLOOD PRESSURE: 75 MMHG | OXYGEN SATURATION: 3 % | RESPIRATION RATE: 16 BRPM | SYSTOLIC BLOOD PRESSURE: 118 MMHG

## 2019-01-21 PROBLEM — Z34.80 PRENATAL CARE, SUBSEQUENT PREGNANCY: Status: ACTIVE | Noted: 2018-08-20

## 2019-01-21 LAB
ALBUMIN UR-MCNC: NEGATIVE MG/DL
AMORPH CRY #/AREA URNS HPF: ABNORMAL /HPF
APPEARANCE UR: ABNORMAL
BACTERIA #/AREA URNS HPF: ABNORMAL /HPF
BILIRUB UR QL STRIP: NEGATIVE
CAOX CRY #/AREA URNS HPF: ABNORMAL /HPF
COLOR UR AUTO: YELLOW
GLUCOSE UR STRIP-MCNC: NEGATIVE MG/DL
HGB UR QL STRIP: NEGATIVE
KETONES UR STRIP-MCNC: NEGATIVE MG/DL
LEUKOCYTE ESTERASE UR QL STRIP: NEGATIVE
MUCOUS THREADS #/AREA URNS LPF: PRESENT /LPF
NITRATE UR QL: NEGATIVE
PH UR STRIP: 6 PH (ref 5–7)
RBC #/AREA URNS AUTO: 2 /HPF (ref 0–2)
SOURCE: ABNORMAL
SP GR UR STRIP: 1.02 (ref 1–1.03)
SQUAMOUS #/AREA URNS AUTO: 1 /HPF (ref 0–1)
UROBILINOGEN UR STRIP-MCNC: 0 MG/DL (ref 0–2)
WBC #/AREA URNS AUTO: <1 /HPF (ref 0–5)

## 2019-01-21 PROCEDURE — 59025 FETAL NON-STRESS TEST: CPT

## 2019-01-21 PROCEDURE — 81001 URINALYSIS AUTO W/SCOPE: CPT | Performed by: OBSTETRICS & GYNECOLOGY

## 2019-01-21 PROCEDURE — 59025 FETAL NON-STRESS TEST: CPT | Mod: 26 | Performed by: OBSTETRICS & GYNECOLOGY

## 2019-01-21 PROCEDURE — G0463 HOSPITAL OUTPT CLINIC VISIT: HCPCS

## 2019-01-21 RX ORDER — ONDANSETRON 2 MG/ML
4 INJECTION INTRAMUSCULAR; INTRAVENOUS EVERY 6 HOURS PRN
Status: DISCONTINUED | OUTPATIENT
Start: 2019-01-21 | End: 2019-01-21 | Stop reason: HOSPADM

## 2019-01-21 NOTE — PROGRESS NOTES
[facility] from home after talking to the clinic RN.  Chief complaint was severe contractions and some vaginal bleeding that occurred on the weekend.  Now all she is feeling is tummy tightening but no pain.  No bleeding vaginally since Saturday.  That had been spotting.  Placed on EFM.  +FM.  No LOF or VB today.  No edema, HA or visual changes.  Reports that she had been to Maternal fetal medicine last Wednesday  and they confirmed a marginal placenta previa.  Hx of primary  with first for breech.  Continue to monitor.  Reports that she is on pelvic rest.  Was at work for part of the day.  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

## 2019-01-21 NOTE — TELEPHONE ENCOUNTER
I would recommend she be seen in L&D for fetal monitoring given her pain and bleeding.   - Dr. Mcclure

## 2019-01-21 NOTE — PROGRESS NOTES
[facility]   OB Triage Note    CC: Contractions and vaginal bleeding     HPI: Ms. Fatimah Roy is a 28 year old  at 29w4d by 8w6d US, who presents with contractions over the weekend (she was timing every 4-5 min) after a bad GI bug with n/v/d. She did note a single drop of blood with wiping. Thinks bleeding was vaginal. That has resolved and the contractions have resolved. Active baby. Pt is a  and noted a lot of Mario-Gonzales tightening whenever she is very active.     Obstetric Complications  1. Low-lying placenta 8mm from cervical os  2. Hx of c/s, planning likely repeat    O:  Patient Vitals for the past 24 hrs:   BP Temp Temp src Resp SpO2   19 1426 118/75 97.9  F (36.6  C) Oral 16 (!) 3 %     Gen: Well-appearing, NAD  CV: Regular rate, warm and well-perfused  Pulm: Breathing comfortably on room air   Abd: Soft, gravid, non-tender      FHT: , mod ciara, + accels,   [place]: 1-2 ctx in 10 mins    A/P:  Ms. Fatimah Roy is a 28 year old  at 29w4d by 8w6d US who presents for evaluation of contractions and a single spot of blood over the weekend. Symptoms have now resolved.     FWB:  - Category 2 FHT, due to isolated single late deceleration with >2hrs of Cat 1 reactive monitoring thereafter    Labor and bleeding precautions reviewed. Pt on pelvic rest. Discussed frequent breaks at work and limited lifting to prevent  contractions/cramping.     Dannielle Mcclure MD  [facility]

## 2019-01-21 NOTE — TELEPHONE ENCOUNTER
To on call provider to advised if needs to be seen in clinic today.    Erica Cummins  [facility] RN

## 2019-01-21 NOTE — PROGRESS NOTES
Pt discharged to home after talking to Dr Mcclure.  Pt was dressed and out the door with spouse before I could review the discharge AVS with her.  Found them walking down the stairs.  Quickly showed her the signs and symptoms to review.  Told her to call the [facility] after hours.  Gave her the phone number and copy of the AVS.  Pt signed.  Her follow up is scheduled.

## 2019-01-21 NOTE — DISCHARGE INSTRUCTIONS
Discharge Instruction for Undelivered Patients      You were seen for: Bleeding Assessment  We Consulted: Dr Mcclure  You had (Test or Medicine):UA, EFM     Diet:   Drink 8 to 12 glasses of liquids (milk, juice, water) every day.  You may eat meals and snacks.     Activity:  Rest the pelvic area. No sex. Do not stimulate breasts or nipples.  Stay on bed rest or partial bed rest. This means modified activity/rest  Count fetal kicks everyday (see handout)     Call your provider if you notice:  Swelling in your face or increased swelling in your hands or legs.  Headaches that are not relieved by Tylenol (acetaminophen).  Changes in your vision (blurring: seeing spots or stars.)  Nausea (sick to your stomach) and vomiting (throwing up).   Weight gain of 5 pounds or more per week.  Heartburn that doesn't go away.  Signs of bladder infection: pain when you urinate (use the toilet), need to go more often and more urgently.  The bag of salinas (rupture of membranes) breaks, or you notice leaking in your underwear.  Bright red blood in your underwear.  Abdominal (lower belly) or stomach pain.  For first baby: Contractions (tightening) less than 5 minutes apart for one hour or more.  Second (plus) baby: Contractions (tightening) less than 10 minutes apart and getting stronger.  *If less than 34 weeks: Contractions (tightenings) more than 6 times in one hour.  Increase or change in vaginal discharge (note the color and amount)  Other:     Follow-up:  As scheduled in the clinic.  Call the [facility] after hours if any questions or concerns.  [phone].

## 2019-01-22 ENCOUNTER — MYC MEDICAL ADVICE (OUTPATIENT)
Dept: OBGYN | Facility: CLINIC | Age: 29
End: 2019-01-22

## 2019-01-31 ENCOUNTER — TELEPHONE (OUTPATIENT)
Dept: OBGYN | Facility: CLINIC | Age: 29
End: 2019-01-31

## 2019-01-31 NOTE — TELEPHONE ENCOUNTER
Patient has forms for Family and Medical Leave Act to be filled out waiting 's signature. Jenna Robb

## 2019-02-01 NOTE — TELEPHONE ENCOUNTER
Signed, completed forms faxed, copy in folder, and sent to scan.     Denise Behrendt  [facility] CSS

## 2019-02-13 ENCOUNTER — HOSPITAL ENCOUNTER (OUTPATIENT)
Dept: ULTRASOUND IMAGING | Facility: CLINIC | Age: 29
Discharge: HOME | End: 2019-02-13
Attending: OBSTETRICS & GYNECOLOGY | Admitting: OBSTETRICS & GYNECOLOGY
Payer: COMMERCIAL

## 2019-02-13 ENCOUNTER — PRENATAL OFFICE VISIT (OUTPATIENT)
Dept: OBGYN | Facility: CLINIC | Age: 29
End: 2019-02-13
Payer: COMMERCIAL

## 2019-02-13 ENCOUNTER — TELEPHONE (OUTPATIENT)
Dept: OBGYN | Facility: CLINIC | Age: 29
End: 2019-02-13

## 2019-02-13 VITALS
HEART RATE: 81 BPM | DIASTOLIC BLOOD PRESSURE: 62 MMHG | HEIGHT: 65 IN | SYSTOLIC BLOOD PRESSURE: 116 MMHG | WEIGHT: 162.6 LBS | BODY MASS INDEX: 27.09 KG/M2 | TEMPERATURE: 96.8 F | RESPIRATION RATE: 16 BRPM

## 2019-02-13 DIAGNOSIS — Z34.83 ENCOUNTER FOR SUPERVISION OF OTHER NORMAL PREGNANCY IN THIRD TRIMESTER: Primary | ICD-10-CM

## 2019-02-13 DIAGNOSIS — Z34.82 PRENATAL CARE, SUBSEQUENT PREGNANCY IN SECOND TRIMESTER: ICD-10-CM

## 2019-02-13 DIAGNOSIS — O44.40 LOW-LYING PLACENTA: ICD-10-CM

## 2019-02-13 PROCEDURE — 99207 ZZC PRENATAL VISIT: CPT | Performed by: OBSTETRICS & GYNECOLOGY

## 2019-02-13 PROCEDURE — 76816 OB US FOLLOW-UP PER FETUS: CPT

## 2019-02-13 ASSESSMENT — MIFFLIN-ST. JEOR: SCORE: 1455.49

## 2019-02-13 NOTE — TELEPHONE ENCOUNTER
[facility] FLMA Forms filled out and placed in Dr. Saeed's inbasket to sign.  Brinda Rosa CMA

## 2019-02-13 NOTE — PROGRESS NOTES
Concerns: ULTRASOUND today shows distance from os to placenta = 1.9 cm  Doing well.  No concerns today.  No vaginal bleeding, LOF.  Reportable signs and symptoms discussed.  Discussed kick counts and fetal movement.  Discussed PTL, PROM, and when to call or come in.  RTC 2 weeks.  Planning on a repeat CS    Alfredo Ruff MD

## 2019-02-14 NOTE — TELEPHONE ENCOUNTER
Changed start date of disability and approved through 6 wk post partum.    Paper work was filled out and faxed.  Sent to be scanned.  Copy put up front.    -Alaina Matias  [facility]

## 2019-02-14 NOTE — TELEPHONE ENCOUNTER
Pt is requesting that her disability forms start her leave early on 1-28-19 and go through 6-7-19 (EDC 4-4-19) due to pre-term contractions.    Will route to Dr. Saeed to advise.    -Alaina Matias  [facility]

## 2019-02-26 ENCOUNTER — PRENATAL OFFICE VISIT (OUTPATIENT)
Dept: OBGYN | Facility: CLINIC | Age: 29
End: 2019-02-26
Payer: COMMERCIAL

## 2019-02-26 VITALS
DIASTOLIC BLOOD PRESSURE: 71 MMHG | RESPIRATION RATE: 16 BRPM | WEIGHT: 168 LBS | SYSTOLIC BLOOD PRESSURE: 113 MMHG | BODY MASS INDEX: 27.99 KG/M2 | TEMPERATURE: 97 F | HEIGHT: 65 IN | HEART RATE: 83 BPM

## 2019-02-26 DIAGNOSIS — Z34.83 ENCOUNTER FOR SUPERVISION OF OTHER NORMAL PREGNANCY IN THIRD TRIMESTER: Primary | ICD-10-CM

## 2019-02-26 PROCEDURE — 99207 ZZC PRENATAL VISIT: CPT | Performed by: OBSTETRICS & GYNECOLOGY

## 2019-02-26 ASSESSMENT — MIFFLIN-ST. JEOR: SCORE: 1479.98

## 2019-02-26 NOTE — PROGRESS NOTES
Prenatal Breastfeeding Education Toolkit provided for patient to review, helping her to make an informed decision on a feeding choice for her baby. Questions directed to the provider.  Brinda Rosa, [facility]

## 2019-02-26 NOTE — NURSING NOTE
"Initial /71 (BP Location: Left arm, Patient Position: Chair, Cuff Size: Adult Regular)   Pulse 83   Temp 97  F (36.1  C) (Tympanic)   Resp 16   Ht 1.638 m (5' 4.5\")   Wt 76.2 kg (168 lb)   LMP 06/28/2018   Breastfeeding? No   BMI 28.39 kg/m   Estimated body mass index is 28.39 kg/m  as calculated from the following:    Height as of this encounter: 1.638 m (5' 4.5\").    Weight as of this encounter: 76.2 kg (168 lb). .    rBinda Rosa, ROBERT    "

## 2019-02-26 NOTE — PROGRESS NOTES
"CC: Here for routine prenatal visit @ 34w5d   HPI: + FM, irreg ctx, no LOF, no VB.  No complaints.     PE: /71 (BP Location: Left arm, Patient Position: Chair, Cuff Size: Adult Regular)   Pulse 83   Temp 97  F (36.1  C) (Tympanic)   Resp 16   Ht 1.638 m (5' 4.5\")   Wt 76.2 kg (168 lb)   LMP 2018   Breastfeeding? No   BMI 28.39 kg/m     See OB flowsheet    A/P  @ 34w5d normal pregnancy    1. Routine prenatal care.    2. : planned for 3/29/19    RTC 1-2 weeks.      Renetta Saeed M.D.    "

## 2019-03-05 ENCOUNTER — PRENATAL OFFICE VISIT (OUTPATIENT)
Dept: OBGYN | Facility: CLINIC | Age: 29
End: 2019-03-05
Payer: COMMERCIAL

## 2019-03-05 VITALS
WEIGHT: 170.1 LBS | HEART RATE: 87 BPM | RESPIRATION RATE: 18 BRPM | DIASTOLIC BLOOD PRESSURE: 69 MMHG | HEIGHT: 65 IN | TEMPERATURE: 98 F | BODY MASS INDEX: 28.34 KG/M2 | SYSTOLIC BLOOD PRESSURE: 115 MMHG

## 2019-03-05 DIAGNOSIS — Z34.83 PRENATAL CARE, SUBSEQUENT PREGNANCY IN THIRD TRIMESTER: Primary | ICD-10-CM

## 2019-03-05 PROCEDURE — 99207 ZZC PRENATAL VISIT: CPT | Performed by: OBSTETRICS & GYNECOLOGY

## 2019-03-05 PROCEDURE — 87653 STREP B DNA AMP PROBE: CPT | Performed by: OBSTETRICS & GYNECOLOGY

## 2019-03-05 ASSESSMENT — MIFFLIN-ST. JEOR: SCORE: 1489.51

## 2019-03-05 NOTE — NURSING NOTE
"Initial /69 (BP Location: Right arm, Patient Position: Chair, Cuff Size: Adult Regular)   Pulse 87   Temp 98  F (36.7  C) (Tympanic)   Resp 18   Ht 1.638 m (5' 4.5\")   Wt 77.2 kg (170 lb 1.6 oz)   LMP 06/28/2018   Breastfeeding? No   BMI 28.75 kg/m   Estimated body mass index is 28.75 kg/m  as calculated from the following:    Height as of this encounter: 1.638 m (5' 4.5\").    Weight as of this encounter: 77.2 kg (170 lb 1.6 oz). .    Brinda Rosa, ROBERT    "

## 2019-03-05 NOTE — PROGRESS NOTES
"CC: Here for routine prenatal visit @ 35w5d   HPI: + FM, no ctx, no LOF, no VB.  No complaints.     PE: /69 (BP Location: Right arm, Patient Position: Chair, Cuff Size: Adult Regular)   Pulse 87   Temp 98  F (36.7  C) (Tympanic)   Resp 18   Ht 1.638 m (5' 4.5\")   Wt 77.2 kg (170 lb 1.6 oz)   LMP 2018   Breastfeeding? No   BMI 28.75 kg/m     See OB flowsheet    GBS today    A/P  @ 35w5d normal pregnancy    1. Routine prenatal care  2.  3/29/19    RTC 1 week    Renetta Saeed M.D.    "

## 2019-03-06 LAB
GP B STREP DNA SPEC QL NAA+PROBE: NEGATIVE
SPECIMEN SOURCE: NORMAL

## 2019-03-15 ENCOUNTER — PRENATAL OFFICE VISIT (OUTPATIENT)
Dept: OBGYN | Facility: CLINIC | Age: 29
End: 2019-03-15
Payer: COMMERCIAL

## 2019-03-15 VITALS
DIASTOLIC BLOOD PRESSURE: 71 MMHG | WEIGHT: 173.8 LBS | TEMPERATURE: 97.2 F | HEIGHT: 65 IN | SYSTOLIC BLOOD PRESSURE: 113 MMHG | RESPIRATION RATE: 16 BRPM | HEART RATE: 91 BPM | BODY MASS INDEX: 28.96 KG/M2

## 2019-03-15 DIAGNOSIS — Z34.83 PRENATAL CARE, SUBSEQUENT PREGNANCY IN THIRD TRIMESTER: Primary | ICD-10-CM

## 2019-03-15 PROBLEM — O44.40 LOW-LYING PLACENTA: Status: RESOLVED | Noted: 2018-12-14 | Resolved: 2019-03-15

## 2019-03-15 PROCEDURE — 99207 ZZC PRENATAL VISIT: CPT | Performed by: OBSTETRICS & GYNECOLOGY

## 2019-03-15 ASSESSMENT — MIFFLIN-ST. JEOR: SCORE: 1506.29

## 2019-03-15 NOTE — NURSING NOTE
"Initial /71 (BP Location: Right arm, Patient Position: Chair, Cuff Size: Adult Regular)   Pulse 91   Temp 97.2  F (36.2  C) (Tympanic)   Resp 16   Ht 1.638 m (5' 4.5\")   Wt 78.8 kg (173 lb 12.8 oz)   LMP 06/28/2018   Breastfeeding? No   BMI 29.37 kg/m   Estimated body mass index is 29.37 kg/m  as calculated from the following:    Height as of this encounter: 1.638 m (5' 4.5\").    Weight as of this encounter: 78.8 kg (173 lb 12.8 oz). .    Brinda Rosa, ROBERT    "

## 2019-03-15 NOTE — PROGRESS NOTES
"CC: Here for routine prenatal visit @ 37w1d   HPI:  Feeling well; denies contractions, LOF or bleeding; reviewed s/s of labor, when to call BC    PE: /71 (BP Location: Right arm, Patient Position: Chair, Cuff Size: Adult Regular)   Pulse 91   Temp 97.2  F (36.2  C) (Tympanic)   Resp 16   Ht 1.638 m (5' 4.5\")   Wt 78.8 kg (173 lb 12.8 oz)   LMP 06/28/2018   Breastfeeding? No   BMI 29.37 kg/m     See OB flowsheet      A:  1. Prenatal care, subsequent pregnancy in third trimester        Routine prenatal care  RTC 1 weeks.      Yris Quezada M.D.     "

## 2019-03-22 ENCOUNTER — PRENATAL OFFICE VISIT (OUTPATIENT)
Dept: OBGYN | Facility: CLINIC | Age: 29
End: 2019-03-22
Payer: COMMERCIAL

## 2019-03-22 VITALS
HEIGHT: 65 IN | SYSTOLIC BLOOD PRESSURE: 117 MMHG | RESPIRATION RATE: 16 BRPM | HEART RATE: 102 BPM | TEMPERATURE: 97.8 F | BODY MASS INDEX: 29.02 KG/M2 | WEIGHT: 174.2 LBS | DIASTOLIC BLOOD PRESSURE: 73 MMHG

## 2019-03-22 DIAGNOSIS — Z34.83 PRENATAL CARE, SUBSEQUENT PREGNANCY IN THIRD TRIMESTER: Primary | ICD-10-CM

## 2019-03-22 PROCEDURE — 99207 ZZC PRENATAL VISIT: CPT | Performed by: OBSTETRICS & GYNECOLOGY

## 2019-03-22 ASSESSMENT — MIFFLIN-ST. JEOR: SCORE: 1508.11

## 2019-03-22 NOTE — PROGRESS NOTES
"CC: Here for routine prenatal visit @ 38w1d   HPI: + FM, no ctx, no LOF, no VB.  No complaints.     PE: /73 (BP Location: Right arm, Patient Position: Chair, Cuff Size: Adult Regular)   Pulse 102   Temp 97.8  F (36.6  C) (Tympanic)   Resp 16   Ht 1.638 m (5' 4.5\")   Wt 79 kg (174 lb 3.2 oz)   LMP 2018   Breastfeeding? No   BMI 29.44 kg/m     See OB flowsheet    A/P  @ 38w1d normal pregnancy    1. Routine prenatal care.   scheduled 3/29/19    RTC 6 weeks.      Renetta Saeed M.D.      PRE-OP EVALUATION:  Today's date: 3/22/2019    Fatimah Roy (: 1990) presents for pre-operative evaluation assessment.  She requires evaluation and anesthesia risk assessment prior to undergoing surgery/procedure for treatment of prior .    Proposed Surgery/ Procedure: repeat  section  Date of Surgery/ Procedure: 3/29/19  Time of Surgery/ Procedure: 0900  Hospital/Surgical Facility: [facility]  Primary Physician: Pedro Luis Leavitt  Type of Anesthesia Anticipated: Spinal    Patient has a Health Care Directive or Living Will:  NO    1. NO - Do you have a history of heart attack, stroke, stent, bypass or surgery on an artery in the head, neck, heart or legs?  2. NO - Do you ever have any pain or discomfort in your chest?  3. NO - Do you have a history of  Heart Failure?  4. YES - ARE YOUR TROUBLED BY SHORTNESS OF BREATH WHEN WALKING ON THE LEVEL, UP A SLIGHT HILL OR AT NIGHT? Hx of asthma--uses inhaler  5. NO - Do you currently have a cold, bronchitis or other respiratory infection?  6. NO - Do you have a cough, shortness of breath or wheezing?  7. NO - Do you sometimes get pains in the calves of your legs when you walk?  8. YES - DO YOU OR ANYONE IN YOUR FAMILY HAVE PREVIOUS HISTORY OF BLOOD CLOTS? Mom--leg arterial clot, needed a stent, on lifelong anticoagulation.  9. YES - DO YOU OR DOES ANYONE IN YOUR FAMILY HAVE A SERIOUS BLEEDING PROBLEM SUCH AS PROLONGED BLEEDING " FOLLOWING SURGERIES OR CUTS? Mom--on anticoagulation  10. NO - Have you ever had problems with anemia or been told to take iron pills?  11. NO - Have you had any abnormal blood loss such as black, tarry or bloody stools, or abnormal vaginal bleeding?  12. NO - Have you ever had a blood transfusion?  13. NO - Have you or any of your relatives ever had problems with anesthesia?  14. NO - Do you have sleep apnea, excessive snoring or daytime drowsiness?  15. NO - Do you have any prosthetic heart valves?  16. NO - Do you have prosthetic joints?  17. YES - IS THERE ANY CHANCE THAT YOU MAY BE PREGNANT? current      HPI:     HPI related to upcoming procedure: 27 yo  @ 38w1d with a prior  section.  Desires repeat .      See problem list for active medical problems.  Problems all longstanding and stable, except as noted/documented.  See ROS for pertinent symptoms related to these conditions.                                                                                                                                                          .    MEDICAL HISTORY:     Patient Active Problem List    Diagnosis Date Noted     Prenatal care, subsequent pregnancy 2018     Priority: Medium     S/P  2017     Priority: Medium     Seasonal allergic rhinitis 2011     Priority: Medium     Chronic allergic conjunctivitis 2011     Priority: Medium     Intermittent asthma 2011     Priority: Medium     CARDIOVASCULAR SCREENING; LDL GOAL LESS THAN 160 10/31/2010     Priority: Low      Past Medical History:   Diagnosis Date     Chickenpox      Uncomplicated asthma      Past Surgical History:   Procedure Laterality Date      SECTION N/A 3/24/2017    Procedure:  SECTION;  Surgeon: Renetta Saeed MD;  Location: [facility]     ESOPHAGOSCOPY, GASTROSCOPY, DUODENOSCOPY (EGD), COMBINED  2012    Procedure: COMBINED ESOPHAGOSCOPY, GASTROSCOPY, DUODENOSCOPY (EGD);   Gastroscopy;  Surgeon: Pete Arciniega MD;  Location: [facility]     MOUTH SURGERY      wisdom teeth     Current Outpatient Medications   Medication Sig Dispense Refill     albuterol (PROAIR HFA/PROVENTIL HFA/VENTOLIN HFA) 108 (90 Base) MCG/ACT inhaler Inhale 2 puffs into the lungs every 6 hours as needed for shortness of breath / dyspnea or wheezing (Patient not taking: Reported on 3/5/2019) 1 Inhaler 1     Calcium Carbonate Antacid (TUMS PO)        cetirizine (ZYRTEC) 10 MG tablet Take 10 mg by mouth daily       Prenatal Vit-Fe Fumarate-FA (PRENATAL MULTIVITAMIN PLUS IRON) 27-0.8 MG TABS per tablet Take 1 tablet by mouth daily       OTC products: None, except as noted above    Allergies   Allergen Reactions     Nkda [No Known Drug Allergies]       Latex Allergy: NO    Social History     Tobacco Use     Smoking status: Never Smoker     Smokeless tobacco: Never Used   Substance Use Topics     Alcohol use: Yes     Alcohol/week: 0.0 oz     Comment: rare- quit with pregnancy     History   Drug Use No       REVIEW OF SYSTEMS:   CONSTITUTIONAL: NEGATIVE for fever, chills, change in weight  ENT/MOUTH: NEGATIVE for ear, mouth and throat problems  RESP: NEGATIVE for significant cough or SOB  CV: NEGATIVE for chest pain, palpitations or peripheral edema    EXAM:   LMP 06/28/2018   GENERAL APPEARANCE: healthy, alert and no distress  RESP: lungs clear to auscultation - no rales, rhonchi or wheezes  CV: regular rate and rhythm, normal S1 S2, no S3 or S4 and no murmur, click or rub   ABDOMEN: soft, nontender, no HSM or masses and bowel sounds normal  NEURO: Normal strength and tone, sensory exam grossly normal, mentation intact and speech normal    DIAGNOSTICS:   Hemoglobin (indicated for history of anemia or procedure with significant blood loss such as tonsillectomy, major intraperitoneal surgery, vascular surgery, major spine surgery, total joint replacement)    Recent Labs   Lab Test 12/14/18  1305 08/30/18  1404   17  1140  12  1226   HGB 11.9 13.2   < > 15.1   < > 13.5    217   < > 287   < > 286   NA  --   --   --  135  --  143   POTASSIUM  --   --   --  3.7  --  4.0   CR  --   --   --  0.64  --  0.75    < > = values in this interval not displayed.        IMPRESSION:   Reason for surgery/procedure: prior     The proposed surgical procedure is considered LOW risk.    REVISED CARDIAC RISK INDEX  The patient has the following serious cardiovascular risks for perioperative complications such as (MI, PE, VFib and 3  AV Block):  No serious cardiac risks  INTERPRETATION: 0 risks: Class I (very low risk - 0.4% complication rate)    The patient has the following additional risks for perioperative complications:  No identified additional risks      ICD-10-CM    1. Prenatal care, subsequent pregnancy in third trimester Z34.83        RECOMMENDATIONS:         --Patient is to take all scheduled medications on the day of surgery EXCEPT for modifications listed below.    APPROVAL GIVEN to proceed with proposed procedure, without further diagnostic evaluation       Signed Electronically by: Renetta Saeed MD    Copy of this evaluation report is provided to requesting physician.    [facility] Preop Guidelines    Revised Cardiac Risk Index

## 2019-03-22 NOTE — PROGRESS NOTES
Prenatal Breastfeeding Education Toolkit provided for patient to review, helping her to make an informed decision on a feeding choice for her baby. Questions directed to the provider.  Completed.  Brinda Rosa, [facility]

## 2019-03-22 NOTE — NURSING NOTE
"Initial /73 (BP Location: Right arm, Patient Position: Chair, Cuff Size: Adult Regular)   Pulse 102   Temp 97.8  F (36.6  C) (Tympanic)   Resp 16   Ht 1.638 m (5' 4.5\")   Wt 79 kg (174 lb 3.2 oz)   LMP 06/28/2018   Breastfeeding? No   BMI 29.44 kg/m   Estimated body mass index is 29.44 kg/m  as calculated from the following:    Height as of this encounter: 1.638 m (5' 4.5\").    Weight as of this encounter: 79 kg (174 lb 3.2 oz). .    Brinda Rosa, ROBERT    "

## 2019-03-25 ENCOUNTER — ANESTHESIA EVENT (OUTPATIENT)
Dept: SURGERY | Facility: CLINIC | Age: 29
End: 2019-03-25
Payer: COMMERCIAL

## 2019-03-29 ENCOUNTER — HOSPITAL ENCOUNTER (INPATIENT)
Facility: CLINIC | Age: 29
LOS: 3 days | Discharge: HOME | End: 2019-04-01
Attending: OBSTETRICS & GYNECOLOGY | Admitting: OBSTETRICS & GYNECOLOGY
Payer: COMMERCIAL

## 2019-03-29 ENCOUNTER — ANESTHESIA (OUTPATIENT)
Dept: SURGERY | Facility: CLINIC | Age: 29
End: 2019-03-29
Payer: COMMERCIAL

## 2019-03-29 DIAGNOSIS — Z98.891 S/P C-SECTION: Primary | ICD-10-CM

## 2019-03-29 LAB
ABO + RH BLD: NORMAL
ABO + RH BLD: NORMAL
BLD GP AB SCN SERPL QL: NORMAL
BLOOD BANK CMNT PATIENT-IMP: NORMAL
HGB BLD-MCNC: 12.4 G/DL (ref 11.7–15.7)
SPECIMEN EXP DATE BLD: NORMAL
T PALLIDUM AB SER QL: NONREACTIVE

## 2019-03-29 PROCEDURE — 86850 RBC ANTIBODY SCREEN: CPT | Performed by: OBSTETRICS & GYNECOLOGY

## 2019-03-29 PROCEDURE — 37000009 ZZH ANESTHESIA TECHNICAL FEE, EACH ADDTL 15 MIN: Performed by: OBSTETRICS & GYNECOLOGY

## 2019-03-29 PROCEDURE — 59514 CESAREAN DELIVERY ONLY: CPT | Mod: AS | Performed by: PHYSICIAN ASSISTANT

## 2019-03-29 PROCEDURE — 36415 COLL VENOUS BLD VENIPUNCTURE: CPT | Performed by: OBSTETRICS & GYNECOLOGY

## 2019-03-29 PROCEDURE — 85018 HEMOGLOBIN: CPT | Performed by: OBSTETRICS & GYNECOLOGY

## 2019-03-29 PROCEDURE — 0064U ANTB TP TOTAL&RPR IA QUAL: CPT | Performed by: OBSTETRICS & GYNECOLOGY

## 2019-03-29 PROCEDURE — 59510 CESAREAN DELIVERY: CPT | Performed by: OBSTETRICS & GYNECOLOGY

## 2019-03-29 PROCEDURE — 25000132 ZZH RX MED GY IP 250 OP 250 PS 637: Performed by: OBSTETRICS & GYNECOLOGY

## 2019-03-29 PROCEDURE — 25000128 H RX IP 250 OP 636: Performed by: NURSE ANESTHETIST, CERTIFIED REGISTERED

## 2019-03-29 PROCEDURE — 25000128 H RX IP 250 OP 636: Performed by: OBSTETRICS & GYNECOLOGY

## 2019-03-29 PROCEDURE — 86901 BLOOD TYPING SEROLOGIC RH(D): CPT | Performed by: OBSTETRICS & GYNECOLOGY

## 2019-03-29 PROCEDURE — 25800030 ZZH RX IP 258 OP 636: Performed by: OBSTETRICS & GYNECOLOGY

## 2019-03-29 PROCEDURE — 25000125 ZZHC RX 250: Performed by: NURSE ANESTHETIST, CERTIFIED REGISTERED

## 2019-03-29 PROCEDURE — 36000058 ZZH SURGERY LEVEL 3 EA 15 ADDTL MIN: Performed by: OBSTETRICS & GYNECOLOGY

## 2019-03-29 PROCEDURE — 36000056 ZZH SURGERY LEVEL 3 1ST 30 MIN: Performed by: OBSTETRICS & GYNECOLOGY

## 2019-03-29 PROCEDURE — 27110028 ZZH OR GENERAL SUPPLY NON-STERILE: Performed by: OBSTETRICS & GYNECOLOGY

## 2019-03-29 PROCEDURE — 86900 BLOOD TYPING SEROLOGIC ABO: CPT | Performed by: OBSTETRICS & GYNECOLOGY

## 2019-03-29 PROCEDURE — 27210794 ZZH OR GENERAL SUPPLY STERILE: Performed by: OBSTETRICS & GYNECOLOGY

## 2019-03-29 PROCEDURE — 12000000 ZZH R&B MED SURG/OB

## 2019-03-29 PROCEDURE — 37000008 ZZH ANESTHESIA TECHNICAL FEE, 1ST 30 MIN: Performed by: OBSTETRICS & GYNECOLOGY

## 2019-03-29 PROCEDURE — 71000012 ZZH RECOVERY PHASE 1 LEVEL 1 FIRST HR: Performed by: OBSTETRICS & GYNECOLOGY

## 2019-03-29 RX ORDER — DEXAMETHASONE SODIUM PHOSPHATE 4 MG/ML
INJECTION, SOLUTION INTRA-ARTICULAR; INTRALESIONAL; INTRAMUSCULAR; INTRAVENOUS; SOFT TISSUE PRN
Status: DISCONTINUED | OUTPATIENT
Start: 2019-03-29 | End: 2019-03-29

## 2019-03-29 RX ORDER — DIPHENHYDRAMINE HYDROCHLORIDE 50 MG/ML
25 INJECTION INTRAMUSCULAR; INTRAVENOUS EVERY 6 HOURS PRN
Status: DISCONTINUED | OUTPATIENT
Start: 2019-03-29 | End: 2019-03-31

## 2019-03-29 RX ORDER — KETOROLAC TROMETHAMINE 30 MG/ML
30 INJECTION, SOLUTION INTRAMUSCULAR; INTRAVENOUS EVERY 6 HOURS
Status: DISPENSED | OUTPATIENT
Start: 2019-03-29 | End: 2019-03-30

## 2019-03-29 RX ORDER — OXYTOCIN 10 [USP'U]/ML
INJECTION, SOLUTION INTRAMUSCULAR; INTRAVENOUS PRN
Status: DISCONTINUED | OUTPATIENT
Start: 2019-03-29 | End: 2019-03-29

## 2019-03-29 RX ORDER — ONDANSETRON 4 MG/1
4 TABLET, ORALLY DISINTEGRATING ORAL EVERY 30 MIN PRN
Status: DISCONTINUED | OUTPATIENT
Start: 2019-03-29 | End: 2019-03-31

## 2019-03-29 RX ORDER — IBUPROFEN 800 MG/1
800 TABLET, FILM COATED ORAL EVERY 6 HOURS PRN
Status: DISCONTINUED | OUTPATIENT
Start: 2019-03-29 | End: 2019-04-01 | Stop reason: HOSPADM

## 2019-03-29 RX ORDER — AMOXICILLIN 250 MG
1 CAPSULE ORAL 2 TIMES DAILY PRN
Status: DISCONTINUED | OUTPATIENT
Start: 2019-03-29 | End: 2019-04-01 | Stop reason: HOSPADM

## 2019-03-29 RX ORDER — LIDOCAINE HYDROCHLORIDE 10 MG/ML
INJECTION, SOLUTION EPIDURAL; INFILTRATION; INTRACAUDAL; PERINEURAL PRN
Status: DISCONTINUED | OUTPATIENT
Start: 2019-03-29 | End: 2019-03-29

## 2019-03-29 RX ORDER — OXYTOCIN 10 [USP'U]/ML
10 INJECTION, SOLUTION INTRAMUSCULAR; INTRAVENOUS
Status: DISCONTINUED | OUTPATIENT
Start: 2019-03-29 | End: 2019-04-01 | Stop reason: HOSPADM

## 2019-03-29 RX ORDER — NALOXONE HYDROCHLORIDE 0.4 MG/ML
.1-.4 INJECTION, SOLUTION INTRAMUSCULAR; INTRAVENOUS; SUBCUTANEOUS
Status: DISCONTINUED | OUTPATIENT
Start: 2019-03-29 | End: 2019-04-01 | Stop reason: HOSPADM

## 2019-03-29 RX ORDER — KETOROLAC TROMETHAMINE 30 MG/ML
INJECTION, SOLUTION INTRAMUSCULAR; INTRAVENOUS PRN
Status: DISCONTINUED | OUTPATIENT
Start: 2019-03-29 | End: 2019-03-29

## 2019-03-29 RX ORDER — EPINEPHRINE 1 MG/ML
INJECTION, SOLUTION, CONCENTRATE INTRAVENOUS PRN
Status: DISCONTINUED | OUTPATIENT
Start: 2019-03-29 | End: 2019-03-29

## 2019-03-29 RX ORDER — ALBUTEROL SULFATE 0.83 MG/ML
2.5 SOLUTION RESPIRATORY (INHALATION) EVERY 4 HOURS PRN
Status: DISCONTINUED | OUTPATIENT
Start: 2019-03-29 | End: 2019-03-31

## 2019-03-29 RX ORDER — DEXTROSE, SODIUM CHLORIDE, SODIUM LACTATE, POTASSIUM CHLORIDE, AND CALCIUM CHLORIDE 5; .6; .31; .03; .02 G/100ML; G/100ML; G/100ML; G/100ML; G/100ML
INJECTION, SOLUTION INTRAVENOUS CONTINUOUS
Status: DISCONTINUED | OUTPATIENT
Start: 2019-03-29 | End: 2019-04-01 | Stop reason: HOSPADM

## 2019-03-29 RX ORDER — DIMENHYDRINATE 50 MG/ML
25 INJECTION, SOLUTION INTRAMUSCULAR; INTRAVENOUS
Status: DISCONTINUED | OUTPATIENT
Start: 2019-03-29 | End: 2019-03-29 | Stop reason: CLARIF

## 2019-03-29 RX ORDER — ONDANSETRON 2 MG/ML
4 INJECTION INTRAMUSCULAR; INTRAVENOUS EVERY 30 MIN PRN
Status: DISCONTINUED | OUTPATIENT
Start: 2019-03-29 | End: 2019-03-31

## 2019-03-29 RX ORDER — CEFAZOLIN SODIUM 1 G/50ML
1 INJECTION, SOLUTION INTRAVENOUS SEE ADMIN INSTRUCTIONS
Status: DISCONTINUED | OUTPATIENT
Start: 2019-03-29 | End: 2019-03-29

## 2019-03-29 RX ORDER — NALOXONE HYDROCHLORIDE 0.4 MG/ML
.1-.4 INJECTION, SOLUTION INTRAMUSCULAR; INTRAVENOUS; SUBCUTANEOUS
Status: DISCONTINUED | OUTPATIENT
Start: 2019-03-29 | End: 2019-03-29

## 2019-03-29 RX ORDER — BISACODYL 10 MG
10 SUPPOSITORY, RECTAL RECTAL DAILY PRN
Status: DISCONTINUED | OUTPATIENT
Start: 2019-03-31 | End: 2019-04-01 | Stop reason: HOSPADM

## 2019-03-29 RX ORDER — SODIUM CHLORIDE, SODIUM LACTATE, POTASSIUM CHLORIDE, CALCIUM CHLORIDE 600; 310; 30; 20 MG/100ML; MG/100ML; MG/100ML; MG/100ML
INJECTION, SOLUTION INTRAVENOUS CONTINUOUS
Status: DISCONTINUED | OUTPATIENT
Start: 2019-03-29 | End: 2019-03-29

## 2019-03-29 RX ORDER — BUPIVACAINE HYDROCHLORIDE 7.5 MG/ML
INJECTION, SOLUTION INTRASPINAL PRN
Status: DISCONTINUED | OUTPATIENT
Start: 2019-03-29 | End: 2019-03-29

## 2019-03-29 RX ORDER — MISOPROSTOL 200 UG/1
400 TABLET ORAL
Status: DISCONTINUED | OUTPATIENT
Start: 2019-03-29 | End: 2019-04-01 | Stop reason: HOSPADM

## 2019-03-29 RX ORDER — OXYTOCIN/0.9 % SODIUM CHLORIDE 30/500 ML
PLASTIC BAG, INJECTION (ML) INTRAVENOUS PRN
Status: DISCONTINUED | OUTPATIENT
Start: 2019-03-29 | End: 2019-03-29

## 2019-03-29 RX ORDER — CEFAZOLIN SODIUM 2 G/100ML
2 INJECTION, SOLUTION INTRAVENOUS
Status: COMPLETED | OUTPATIENT
Start: 2019-03-29 | End: 2019-03-29

## 2019-03-29 RX ORDER — OXYCODONE HYDROCHLORIDE 5 MG/1
5-10 TABLET ORAL
Status: DISCONTINUED | OUTPATIENT
Start: 2019-03-29 | End: 2019-04-01 | Stop reason: HOSPADM

## 2019-03-29 RX ORDER — SIMETHICONE 80 MG
80 TABLET,CHEWABLE ORAL 4 TIMES DAILY PRN
Status: DISCONTINUED | OUTPATIENT
Start: 2019-03-29 | End: 2019-04-01 | Stop reason: HOSPADM

## 2019-03-29 RX ORDER — MORPHINE SULFATE 1 MG/ML
INJECTION, SOLUTION EPIDURAL; INTRATHECAL; INTRAVENOUS PRN
Status: DISCONTINUED | OUTPATIENT
Start: 2019-03-29 | End: 2019-03-29

## 2019-03-29 RX ORDER — MEPERIDINE HYDROCHLORIDE 25 MG/ML
12.5 INJECTION INTRAMUSCULAR; INTRAVENOUS; SUBCUTANEOUS EVERY 5 MIN PRN
Status: DISCONTINUED | OUTPATIENT
Start: 2019-03-29 | End: 2019-03-29 | Stop reason: CLARIF

## 2019-03-29 RX ORDER — DEXAMETHASONE SODIUM PHOSPHATE 4 MG/ML
4 INJECTION, SOLUTION INTRA-ARTICULAR; INTRALESIONAL; INTRAMUSCULAR; INTRAVENOUS; SOFT TISSUE
Status: DISCONTINUED | OUTPATIENT
Start: 2019-03-29 | End: 2019-03-29 | Stop reason: CLARIF

## 2019-03-29 RX ORDER — ONDANSETRON 2 MG/ML
4 INJECTION INTRAMUSCULAR; INTRAVENOUS EVERY 6 HOURS PRN
Status: DISCONTINUED | OUTPATIENT
Start: 2019-03-29 | End: 2019-03-29

## 2019-03-29 RX ORDER — DIPHENHYDRAMINE HYDROCHLORIDE 50 MG/ML
25 INJECTION INTRAMUSCULAR; INTRAVENOUS EVERY 6 HOURS PRN
Status: DISCONTINUED | OUTPATIENT
Start: 2019-03-29 | End: 2019-04-01 | Stop reason: HOSPADM

## 2019-03-29 RX ORDER — MORPHINE SULFATE 1 MG/ML
200 INJECTION, SOLUTION EPIDURAL; INTRATHECAL; INTRAVENOUS ONCE
Status: DISCONTINUED | OUTPATIENT
Start: 2019-03-29 | End: 2019-03-31

## 2019-03-29 RX ORDER — ACETAMINOPHEN 325 MG/1
650 TABLET ORAL EVERY 4 HOURS PRN
Status: DISCONTINUED | OUTPATIENT
Start: 2019-04-01 | End: 2019-04-01 | Stop reason: HOSPADM

## 2019-03-29 RX ORDER — LANOLIN 100 %
OINTMENT (GRAM) TOPICAL
Status: DISCONTINUED | OUTPATIENT
Start: 2019-03-29 | End: 2019-04-01 | Stop reason: HOSPADM

## 2019-03-29 RX ORDER — SCOLOPAMINE TRANSDERMAL SYSTEM 1 MG/1
1 PATCH, EXTENDED RELEASE TRANSDERMAL ONCE
Status: DISCONTINUED | OUTPATIENT
Start: 2019-03-29 | End: 2019-03-31

## 2019-03-29 RX ORDER — CITRIC ACID/SODIUM CITRATE 334-500MG
30 SOLUTION, ORAL ORAL
Status: DISCONTINUED | OUTPATIENT
Start: 2019-03-29 | End: 2019-03-29

## 2019-03-29 RX ORDER — OXYTOCIN/0.9 % SODIUM CHLORIDE 30/500 ML
340 PLASTIC BAG, INJECTION (ML) INTRAVENOUS CONTINUOUS PRN
Status: DISCONTINUED | OUTPATIENT
Start: 2019-03-29 | End: 2019-04-01 | Stop reason: HOSPADM

## 2019-03-29 RX ORDER — HYDROCORTISONE 2.5 %
CREAM (GRAM) TOPICAL 3 TIMES DAILY PRN
Status: DISCONTINUED | OUTPATIENT
Start: 2019-03-29 | End: 2019-04-01 | Stop reason: HOSPADM

## 2019-03-29 RX ORDER — HYDROMORPHONE HYDROCHLORIDE 1 MG/ML
.3-.5 INJECTION, SOLUTION INTRAMUSCULAR; INTRAVENOUS; SUBCUTANEOUS EVERY 30 MIN PRN
Status: DISCONTINUED | OUTPATIENT
Start: 2019-03-29 | End: 2019-04-01 | Stop reason: HOSPADM

## 2019-03-29 RX ORDER — ACETAMINOPHEN 325 MG/1
975 TABLET ORAL EVERY 8 HOURS
Status: DISPENSED | OUTPATIENT
Start: 2019-03-29 | End: 2019-04-01

## 2019-03-29 RX ORDER — LIDOCAINE 40 MG/G
CREAM TOPICAL
Status: DISCONTINUED | OUTPATIENT
Start: 2019-03-29 | End: 2019-03-31

## 2019-03-29 RX ORDER — PRENATAL VIT/IRON FUM/FOLIC AC 27MG-0.8MG
1 TABLET ORAL DAILY
Status: DISCONTINUED | OUTPATIENT
Start: 2019-03-29 | End: 2019-04-01 | Stop reason: HOSPADM

## 2019-03-29 RX ORDER — ONDANSETRON 2 MG/ML
4 INJECTION INTRAMUSCULAR; INTRAVENOUS EVERY 6 HOURS PRN
Status: DISCONTINUED | OUTPATIENT
Start: 2019-03-29 | End: 2019-04-01 | Stop reason: HOSPADM

## 2019-03-29 RX ORDER — EPHEDRINE SULFATE 50 MG/ML
5 INJECTION, SOLUTION INTRAMUSCULAR; INTRAVENOUS; SUBCUTANEOUS
Status: DISCONTINUED | OUTPATIENT
Start: 2019-03-29 | End: 2019-03-31

## 2019-03-29 RX ORDER — LIDOCAINE 40 MG/G
CREAM TOPICAL
Status: DISCONTINUED | OUTPATIENT
Start: 2019-03-29 | End: 2019-03-29

## 2019-03-29 RX ORDER — DIPHENHYDRAMINE HCL 25 MG
25 CAPSULE ORAL EVERY 6 HOURS PRN
Status: DISCONTINUED | OUTPATIENT
Start: 2019-03-29 | End: 2019-04-01 | Stop reason: HOSPADM

## 2019-03-29 RX ORDER — LIDOCAINE 40 MG/G
CREAM TOPICAL
Status: DISCONTINUED | OUTPATIENT
Start: 2019-03-29 | End: 2019-04-01 | Stop reason: HOSPADM

## 2019-03-29 RX ORDER — OXYTOCIN/0.9 % SODIUM CHLORIDE 30/500 ML
100 PLASTIC BAG, INJECTION (ML) INTRAVENOUS CONTINUOUS
Status: DISCONTINUED | OUTPATIENT
Start: 2019-03-29 | End: 2019-04-01 | Stop reason: HOSPADM

## 2019-03-29 RX ORDER — EPHEDRINE SULFATE 50 MG/ML
INJECTION, SOLUTION INTRAVENOUS PRN
Status: DISCONTINUED | OUTPATIENT
Start: 2019-03-29 | End: 2019-03-29

## 2019-03-29 RX ORDER — ACETAMINOPHEN 325 MG/1
975 TABLET ORAL ONCE
Status: DISCONTINUED | OUTPATIENT
Start: 2019-03-29 | End: 2019-03-29

## 2019-03-29 RX ORDER — AMOXICILLIN 250 MG
2 CAPSULE ORAL 2 TIMES DAILY PRN
Status: DISCONTINUED | OUTPATIENT
Start: 2019-03-29 | End: 2019-04-01 | Stop reason: HOSPADM

## 2019-03-29 RX ORDER — FENTANYL CITRATE 50 UG/ML
25-50 INJECTION, SOLUTION INTRAMUSCULAR; INTRAVENOUS
Status: DISCONTINUED | OUTPATIENT
Start: 2019-03-29 | End: 2019-03-31

## 2019-03-29 RX ORDER — HYDROMORPHONE HYDROCHLORIDE 1 MG/ML
.3-.5 INJECTION, SOLUTION INTRAMUSCULAR; INTRAVENOUS; SUBCUTANEOUS EVERY 5 MIN PRN
Status: DISCONTINUED | OUTPATIENT
Start: 2019-03-29 | End: 2019-03-31

## 2019-03-29 RX ORDER — ONDANSETRON 4 MG/1
4 TABLET, ORALLY DISINTEGRATING ORAL EVERY 6 HOURS PRN
Status: DISCONTINUED | OUTPATIENT
Start: 2019-03-29 | End: 2019-03-29

## 2019-03-29 RX ORDER — DIPHENHYDRAMINE HCL 25 MG
25 CAPSULE ORAL EVERY 6 HOURS PRN
Status: DISCONTINUED | OUTPATIENT
Start: 2019-03-29 | End: 2019-03-31

## 2019-03-29 RX ADMIN — PRENATAL VIT W/ FE FUMARATE-FA TAB 27-0.8 MG 1 TABLET: 27-0.8 TAB at 20:16

## 2019-03-29 RX ADMIN — OXYTOCIN 2.5 UNITS: 10 INJECTION, SOLUTION INTRAMUSCULAR; INTRAVENOUS at 09:35

## 2019-03-29 RX ADMIN — OXYTOCIN-SODIUM CHLORIDE 0.9% IV SOLN 30 UNIT/500ML 500 ML: 30-0.9/5 SOLUTION at 09:33

## 2019-03-29 RX ADMIN — KETOROLAC TROMETHAMINE 30 MG: 30 INJECTION, SOLUTION INTRAMUSCULAR at 09:36

## 2019-03-29 RX ADMIN — KETOROLAC TROMETHAMINE 30 MG: 30 INJECTION, SOLUTION INTRAMUSCULAR at 15:05

## 2019-03-29 RX ADMIN — OXYTOCIN 2.5 UNITS: 10 INJECTION, SOLUTION INTRAMUSCULAR; INTRAVENOUS at 09:33

## 2019-03-29 RX ADMIN — EPHEDRINE SULFATE 10 MG: 50 INJECTION, SOLUTION INTRAVENOUS at 09:16

## 2019-03-29 RX ADMIN — SENNOSIDES AND DOCUSATE SODIUM 1 TABLET: 8.6; 5 TABLET ORAL at 20:16

## 2019-03-29 RX ADMIN — LIDOCAINE HYDROCHLORIDE 1 ML: 10 INJECTION, SOLUTION EPIDURAL; INFILTRATION; INTRACAUDAL; PERINEURAL at 09:13

## 2019-03-29 RX ADMIN — MORPHINE SULFATE 0.2 MG: 1 INJECTION, SOLUTION EPIDURAL; INTRATHECAL; INTRAVENOUS at 09:14

## 2019-03-29 RX ADMIN — LIDOCAINE HYDROCHLORIDE 1 ML: 10 INJECTION, SOLUTION EPIDURAL; INFILTRATION; INTRACAUDAL; PERINEURAL at 09:11

## 2019-03-29 RX ADMIN — DEXAMETHASONE SODIUM PHOSPHATE 4 MG: 4 INJECTION, SOLUTION INTRA-ARTICULAR; INTRALESIONAL; INTRAMUSCULAR; INTRAVENOUS; SOFT TISSUE at 09:36

## 2019-03-29 RX ADMIN — SIMETHICONE CHEW TAB 80 MG 80 MG: 80 TABLET ORAL at 20:16

## 2019-03-29 RX ADMIN — PHENYLEPHRINE HYDROCHLORIDE 100 MCG: 10 INJECTION, SOLUTION INTRAMUSCULAR; INTRAVENOUS; SUBCUTANEOUS at 09:16

## 2019-03-29 RX ADMIN — SODIUM CHLORIDE, POTASSIUM CHLORIDE, SODIUM LACTATE AND CALCIUM CHLORIDE 1000 ML: 600; 310; 30; 20 INJECTION, SOLUTION INTRAVENOUS at 08:50

## 2019-03-29 RX ADMIN — CEFAZOLIN SODIUM 2 G: 2 INJECTION, SOLUTION INTRAVENOUS at 08:50

## 2019-03-29 RX ADMIN — EPINEPHRINE 0.2 MG: 1 INJECTION, SOLUTION INTRAMUSCULAR; SUBCUTANEOUS at 09:14

## 2019-03-29 RX ADMIN — KETOROLAC TROMETHAMINE 30 MG: 30 INJECTION, SOLUTION INTRAMUSCULAR at 21:10

## 2019-03-29 RX ADMIN — PHENYLEPHRINE HYDROCHLORIDE 50 MCG: 10 INJECTION, SOLUTION INTRAMUSCULAR; INTRAVENOUS; SUBCUTANEOUS at 09:22

## 2019-03-29 RX ADMIN — SODIUM CHLORIDE, POTASSIUM CHLORIDE, SODIUM LACTATE AND CALCIUM CHLORIDE: 600; 310; 30; 20 INJECTION, SOLUTION INTRAVENOUS at 09:03

## 2019-03-29 RX ADMIN — BUPIVACAINE HYDROCHLORIDE IN DEXTROSE 1.6 MG: 7.5 INJECTION, SOLUTION SUBARACHNOID at 09:14

## 2019-03-29 RX ADMIN — ACETAMINOPHEN 975 MG: 325 TABLET, FILM COATED ORAL at 15:05

## 2019-03-29 ASSESSMENT — MIFFLIN-ST. JEOR: SCORE: 1507.92

## 2019-03-29 NOTE — ANESTHESIA CARE TRANSFER NOTE
Patient: Fatimah Roy    Procedure(s):   SECTION, Repeat    Diagnosis: prior   Diagnosis Additional Information: No value filed.    Anesthesia Type:   Spinal     Note:  Airway :Room Air  Patient transferred to:Phase II  Handoff Report: Identifed the Patient, Identified the Reponsible Provider, Reviewed the pertinent medical history, Discussed the surgical course, Reviewed Intra-OP anesthesia mangement and issues during anesthesia, Set expectations for post-procedure period and Allowed opportunity for questions and acknowledgement of understanding      Vitals: (Last set prior to Anesthesia Care Transfer)    CRNA VITALS  3/29/2019 0936 - 3/29/2019 1013      3/29/2019             Pulse:  83    SpO2:  95 %                Electronically Signed By: HECTOR Enciso CRNA  2019  10:13 AM

## 2019-03-29 NOTE — ANESTHESIA POSTPROCEDURE EVALUATION
Patient: Fatimah Roy    Procedure(s):   SECTION, Repeat    Diagnosis:prior   Diagnosis Additional Information: No value filed.    Anesthesia Type:  Spinal    Note:  Anesthesia Post Evaluation    Patient location during evaluation: Phase 2  Patient participation: Able to participate in evaluation but full recovery from regional anesthesia has not yet ocurrred but is anticipated to occur within 48 hours  Level of consciousness: awake and alert  Pain management: adequate  Airway patency: patent  Cardiovascular status: acceptable and hemodynamically stable  Respiratory status: acceptable, room air and spontaneous ventilation  Hydration status: acceptable  PONV: none     Anesthetic complications: None          Last vitals:  Vitals:    19 0800   BP: 127/77   Pulse: 86   Resp: 16   Temp: 37.1  C (98.8  F)         Electronically Signed By: HECTOR Enciso CRNA  2019  10:14 AM

## 2019-03-29 NOTE — PLAN OF CARE
Pt here from home for a repeat c/s with  Itz. Previous c/s for breech presentation.  Questions answered. Cat 1 tracing, VSS afebrile. IV scrub abdomen, voiding before procedure. Ancef infused 30 min before c/s

## 2019-03-29 NOTE — PLAN OF CARE
Patient to OR via ambulation. Delivered by C/S by Dr. Saeed. Baby delivered  and brought to prewarmed infant warmer. Infant stimulated and dried. Apgars 9/9. Brought to mother after bundling for bonding.  Data: Vital signs within normal limits. Postpartum checks within normal limits - see flow record. Patient eating and drinking normally. Patient has an indwelling catheter. . Patient has not yet ambulated..   No apparent signs of infection. Incision healing well. Patient Is not performing self cares and Is not able to care for infant. Positive attachment behaviors are observed with infant. Support persons are present.  Action:  Pain plan was discussed. Patient would like pain meds to be brought in when they are due. Patient was medicated during the shift for C/S . See MAR.Patient education done about breastfeeding,  cares, postpartum cares and pain management/plan. See flow record.  Response:   Patient reassessed within 1 hour after each medication for pain. Patient stated that pain had improved. Patient stated that she was comfortable. .   Plan: Anticipate discharge on 19.

## 2019-03-29 NOTE — BRIEF OP NOTE
[facility] Brief Operative Note    Pre-operative diagnosis: prior    Post-operative diagnosis prior  section     Procedure: Procedure(s):   SECTION, Repeat   Surgeon(s): Surgeon(s) and Role:     * Renetta Saeed MD - Primary     * Itz Eden PA-C - Assisting   Estimated blood loss: 131    Specimens: * No specimens in log *   Findings: viable infant female in vertex presentation weighing 7 lb 4oz with Apgars of  9 at 1 minute and 9 at 5 minutes.  Clear amniotic fluid.  Placenta with 3 vessel cord.  Normal uterus, tubes and ovaries

## 2019-03-29 NOTE — ANESTHESIA PREPROCEDURE EVALUATION
Anesthesia Pre-Procedure Evaluation    Patient: Fatimah Roy   MRN: [id number] : 1990          Preoperative Diagnosis: prior     Procedure(s):   SECTION    Past Medical History:   Diagnosis Date     Chickenpox      Uncomplicated asthma      Past Surgical History:   Procedure Laterality Date      SECTION N/A 3/24/2017    Procedure:  SECTION;  Surgeon: Renetta Saeed MD;  Location: [facility] OR     ESOPHAGOSCOPY, GASTROSCOPY, DUODENOSCOPY (EGD), COMBINED  2012    Procedure: COMBINED ESOPHAGOSCOPY, GASTROSCOPY, DUODENOSCOPY (EGD);  Gastroscopy;  Surgeon: Pete Arciniega MD;  Location: [facility]     MOUTH SURGERY      wisdom teeth       Anesthesia Evaluation     . Pt has had prior anesthetic. Type: MAC and Regional    No history of anesthetic complications          ROS/MED HX    ENT/Pulmonary:     (+)allergic rhinitis, Intermittent asthma Treatment: Inhaler prn,  , . .    Neurologic:  - neg neurologic ROS     Cardiovascular:  - neg cardiovascular ROS       METS/Exercise Tolerance:  >4 METS   Hematologic:  - neg hematologic  ROS       Musculoskeletal:  - neg musculoskeletal ROS       GI/Hepatic:     (+) GERD Symptomatic,       Renal/Genitourinary:  - ROS Renal section negative       Endo:  - neg endo ROS       Psychiatric:  - neg psychiatric ROS       Infectious Disease:  - neg infectious disease ROS       Malignancy:      - no malignancy   Other:    (+) Possibly pregnant C-spine cleared: N/A, no H/O Chronic Pain,no other significant disability                         Physical Exam  Normal systems: cardiovascular, pulmonary and dental    Airway   Mallampati: II  TM distance: >3 FB  Neck ROM: full    Dental     Cardiovascular       Pulmonary             Lab Results   Component Value Date    WBC 11.9 (H) 2018    HGB 12.4 2019    HCT 35.6 2018     2018     2017    POTASSIUM 3.7 2017    CHLORIDE 102 2017    CO2 27  "08/09/2017    BUN 12 08/09/2017    CR 0.64 08/09/2017    GLC 75 08/09/2017    MARGRET 9.4 08/09/2017    ALBUMIN 4.5 07/24/2012    PROTTOTAL 7.9 07/24/2012    ALT 12 07/24/2012    AST 24 07/24/2012    ALKPHOS 77 07/24/2012    BILITOTAL 0.5 07/24/2012    BILIDIRECT 0.5 (A) 09/09/1999    TSH 2.82 08/09/2017    HCG Negative 03/13/2009       Preop Vitals  BP Readings from Last 3 Encounters:   03/29/19 127/77   03/22/19 117/73   03/15/19 113/71    Pulse Readings from Last 3 Encounters:   03/29/19 86   03/22/19 102   03/15/19 91      Resp Readings from Last 3 Encounters:   03/29/19 16   03/22/19 16   03/15/19 16    SpO2 Readings from Last 3 Encounters:   01/21/19 (!) 3%   03/26/18 95%   09/06/17 96%      Temp Readings from Last 1 Encounters:   03/29/19 37.1  C (98.8  F) (Oral)    Ht Readings from Last 1 Encounters:   03/29/19 1.638 m (5' 4.49\")      Wt Readings from Last 1 Encounters:   03/29/19 79 kg (174 lb 3.2 oz)    Estimated body mass index is 29.45 kg/m  as calculated from the following:    Height as of this encounter: 1.638 m (5' 4.49\").    Weight as of this encounter: 79 kg (174 lb 3.2 oz).       Anesthesia Plan      History & Physical Review  History and physical reviewed and following examination; no interval change.    ASA Status:  2 .    NPO Status:  > 8 hours    Plan for Spinal Maintenance will be Balanced.    PONV prophylaxis:  Ondansetron (or other 5HT-3) and Dexamethasone or Solumedrol       Postoperative Care  Postoperative pain management:  IV analgesics, Oral pain medications and Neuraxial analgesia.      Consents  Anesthetic plan, risks, benefits and alternatives discussed with:  Patient and Spouse..                 HECTOR Enciso CRNA  "

## 2019-03-29 NOTE — PLAN OF CARE
No Change  Infection (Postpartum  Delivery)  Absence of Infection Signs/Symptoms  3/29/2019 1627 - No Change by Shonda Schwab, RN  Note  Pt vss, afebrile.  Passed few small clots.  Small amt vaginal bleeding.  Pt stable.   3/29/2019 1329 - Improving by Lisa Valentine, RN  Pain (Postpartum  Delivery)  Acceptable Pain Control  3/29/2019 1627 - No Change by Shonda Schwab, MACEY  Note  Pt c/o minimal amt incisional discomfort.  Taking scheduled Toradol for discomfort  3/29/2019 132 - Improving by Lisa Valentine, RN

## 2019-03-29 NOTE — L&D DELIVERY NOTE
"Delivery Summary    Fatimah Roy MRN# [id number]   Age: 28 year old YOB: 1990     ASSESSMENT & PLAN: Fatimah Roy is a 28 year old  @ 39w1d here for repeat  section.     viable infant female in vertex presentation weighing 7 lb 4oz with Apgars of  9 at 1 minute and 9 at 5 minutes.  Clear amniotic fluid.  Placenta with 3 vessel cord.  Normal uterus, tubes and ovaries           Labor Events    Labor Type:  Scheduled      Antibiotics received during labor?:  Yes     Delivery/Placenta Date and Time    Delivery Date:  3/29/19 Delivery Time:   9:33 AM      Apgars    Living status:  Living   1 Minute 5 Minute 10 Minute 15 Minute 20 Minute   Skin color: 1  1       Heart rate: 2  2       Reflex irritability: 2  2       Muscle tone: 2  2       Respiratory effort: 2  2       Total: 9  9       Apgars assigned by:  YVROSE     Cord    Vessels:  3 Vessels Complications:  None   Cord Blood Disposition:  Lab Gases Sent?:  No      Bee Resuscitation    Methods:  None      Measurements    Weight:  7 lb 4 oz Length:  1' 9\"   Head circumference:  34.3 cm       Labor Events and Shoulder Dystocia    Fetal Tracing Prior to Delivery:  Category 1     Delivery (Maternal) (Provider to Complete) ([id number])       Blood Loss  Mother: Fatimah Roy #[id number]   Start of Mother's Information    IO Blood Loss  19 0925 - 19 1031    Total Surgical QBL Blood Loss (mL) Hospital Encounter 131 mL    Total  131 mL         End of Mother's Information  Mother: Fatimah Roy #[id number]         Delivery - Provider to Complete ([id number])    Delivering clinician:  Renetta Saeed MD  Attempted Delivery Types (Choose all that apply):  Other  Delivery Type (Choose the 1 that will go to the Birth History):  , Low Transverse   Specifics:  Repeat  Indications for Repeat:  39+ weeks /Planned repeat   Other personnel:   Provider Role   Deya Randolph, " MACEY Abdi, HECTOR Pink CRNA, Jennifer, RN Kohlhase, Mark E, PA-C          Placenta    Immediate Cord Clamping:  Done  Removal:  Expressed  Disposition:  Hospital disposal     Anesthesia    Method:  Spinal          Presentation and Position    Presentation:  Vertex          Renetta Saeed MD

## 2019-03-29 NOTE — OP NOTE
Op Note    Preop Dx: 1. 39w1d  intrauterine pregnancy , 2. Prior     Postop DX: [email or web address]    Procedure: repeat low transverse  section     Surgeon: Renetta Saeed M.D.     Assist: Itz Eden (A surgical assistant was required for this surgery for his experience with retraction, achievement of hemostasis, and wound closure)      Anesthesia: spinal    FRA Score: 2    EBL: 131 cc    IVF: see anesthesia records    UOP: see anesthesia records    Complications: none    Findings: viable infant female in vertex presentation weighing 7 lb 4oz with Apgars of  9 at 1 minute and 9 at 5 minutes.  Clear amniotic fluid.  Placenta with 3 vessel cord.  Normal uterus, tubes and ovaries.    Indications: Fatimah Roy is a 28 year old  2 Para 1001 who [facility] the [facility] at 39 and 1/7 weeks for repeat  section due to prior .  Patient understood risks and benefits including risks of bleeding, infection and damage to surrounding structures.  Patient agreed to proceed.     Procedure: Patient was brought to the operating room and spinal anesthesia was found to be adequate.  She was prepped and draped in the usual sterile fashion in the dorsal supine position with a leftward tilt.  A pfannensteil incision was created in the usual fashion.  An Nuno-O retractor was placed in the incision and the lower uterine segment well visualized.  The lower uterine segment was incised sharply and transversely with the scalpel.  Clear amniotic fluid was noted.  The infant vertex was brought out of the uterine incision without difficulty.  The rest of the infant was delivered without difficulty.  The cord was clamped and cut.  The infant was handed off to the waiting nurse.  Cord blood was obtained.  The placenta was expressed intact with a 3 vessel cord.  The uterus was exteriorized and cleared of all clot and debris.  The uterine incision was repaired with 0-Vicryl in a running fashion followed by a  second layer of 0-Monocryl in a imbricated fashion.  Excellent hemostasis was noted.  The uterus was returned to the abdomen.  The gutters were cleared of all clot and debris.  Reinspection of the incision revealed excellent hemostasis.  The Nuno-O was removed.  The peritoneum was repaired with 2-0 vicryl in a running fashion.   The fascia was repaired with 0-Vicryl in a running fashion.   The subcutaneous tissue was repaired with 3-0 Plain in a running fashion.   The skin was closed with 4-0 V-Sy-90 in a subcuticular fashion.   The incision was dressed with Secure Seal skin adhesive.      Patient tolerated procedure well.  Sponge, lap and needle counts are correct.  I was present for the entire procedure.  Patient was taken to her recovery room in stable condition.  Emy-operative antibiotics were given.  A surgical assistant was required for this surgery for his experience with retraction, achievement of hemostasis, and wound closure.

## 2019-03-30 LAB — HGB BLD-MCNC: 10.4 G/DL (ref 11.7–15.7)

## 2019-03-30 PROCEDURE — 12000000 ZZH R&B MED SURG/OB

## 2019-03-30 PROCEDURE — 85018 HEMOGLOBIN: CPT | Performed by: OBSTETRICS & GYNECOLOGY

## 2019-03-30 PROCEDURE — 25000128 H RX IP 250 OP 636: Performed by: OBSTETRICS & GYNECOLOGY

## 2019-03-30 PROCEDURE — 25000132 ZZH RX MED GY IP 250 OP 250 PS 637: Performed by: OBSTETRICS & GYNECOLOGY

## 2019-03-30 PROCEDURE — 36415 COLL VENOUS BLD VENIPUNCTURE: CPT | Performed by: OBSTETRICS & GYNECOLOGY

## 2019-03-30 PROCEDURE — 40000274 ZZH STATISTIC RCP CONSULT EA 30 MIN

## 2019-03-30 RX ADMIN — SENNOSIDES AND DOCUSATE SODIUM 2 TABLET: 8.6; 5 TABLET ORAL at 19:50

## 2019-03-30 RX ADMIN — KETOROLAC TROMETHAMINE 30 MG: 30 INJECTION, SOLUTION INTRAMUSCULAR at 02:56

## 2019-03-30 RX ADMIN — ACETAMINOPHEN 975 MG: 325 TABLET, FILM COATED ORAL at 11:16

## 2019-03-30 RX ADMIN — IBUPROFEN 800 MG: 800 TABLET ORAL at 22:09

## 2019-03-30 RX ADMIN — SIMETHICONE CHEW TAB 80 MG 80 MG: 80 TABLET ORAL at 18:09

## 2019-03-30 RX ADMIN — ACETAMINOPHEN 975 MG: 325 TABLET, FILM COATED ORAL at 18:50

## 2019-03-30 RX ADMIN — SIMETHICONE CHEW TAB 80 MG 80 MG: 80 TABLET ORAL at 11:16

## 2019-03-30 RX ADMIN — IBUPROFEN 800 MG: 800 TABLET ORAL at 16:10

## 2019-03-30 RX ADMIN — OXYCODONE HYDROCHLORIDE 5 MG: 5 TABLET ORAL at 18:09

## 2019-03-30 RX ADMIN — ACETAMINOPHEN 975 MG: 325 TABLET, FILM COATED ORAL at 02:56

## 2019-03-30 RX ADMIN — PRENATAL VIT W/ FE FUMARATE-FA TAB 27-0.8 MG 1 TABLET: 27-0.8 TAB at 08:24

## 2019-03-30 RX ADMIN — IBUPROFEN 800 MG: 800 TABLET ORAL at 09:46

## 2019-03-30 RX ADMIN — OXYCODONE HYDROCHLORIDE 5 MG: 5 TABLET ORAL at 13:43

## 2019-03-30 RX ADMIN — SIMETHICONE CHEW TAB 80 MG 80 MG: 80 TABLET ORAL at 08:24

## 2019-03-30 RX ADMIN — OXYCODONE HYDROCHLORIDE 5 MG: 5 TABLET ORAL at 21:09

## 2019-03-30 RX ADMIN — OXYCODONE HYDROCHLORIDE 5 MG: 5 TABLET ORAL at 08:24

## 2019-03-30 RX ADMIN — OXYCODONE HYDROCHLORIDE 5 MG: 5 TABLET ORAL at 11:17

## 2019-03-30 NOTE — PLAN OF CARE
Data: Vital signs within normal limits. Postpartum checks within normal limits - see flow record. Patient eating and drinking normally. Patient able to empty bladder independently. . Patient ambulating independently..   No apparent signs of infection. Incision healing well. Patient is performing self cares and is able to care for infant. Positive attachment behaviors are observed with infant. Support persons are present.  Action:  Pain plan was discussed. Patient would like pain meds to be brought in when they are due. Patient was medicated during the shift for pain and cramping. See MAR.Patient education done about hand hygiene, breastfeeding,  cares and postpartum cares. See flow record.  Response:   Patient reassessed within 1 hour after each medication for pain. Patient stated that pain had improved. Patient stated that she was comfortable. .   Plan: Anticipate discharge on 19.

## 2019-03-30 NOTE — PROGRESS NOTES
"PPD # 1    S: patient without complaints.  Ambulating, voiding and passing flatus.  Pain moderately well controlled.  O: /65   Pulse 83   Temp 97.8  F (36.6  C) (Oral)   Resp 16   Ht 1.638 m (5' 4.49\")   Wt 79 kg (174 lb 3.2 oz)   LMP 2018   SpO2 94%   Breastfeeding? No   BMI 29.45 kg/m     NAD  Abd: soft, nontender, fundus firm  Inc: clean and intact  Ext: calves nontender    Hemoglobin   Date Value Ref Range Status   2019 10.4 (L) 11.7 - 15.7 g/dL Final   (pre-op hgb 12.4)    A/P PPD # 1 s/p low transverse  section     Routine PP care.  Anemia appropriate for blood loss and hemodilution.  stable vital signs.      Renetta Saeed M.D.    "

## 2019-03-30 NOTE — PLAN OF CARE
Pt follows PP pathway without incident, utilizing toradol & tylenol for pain which are working well for her.  She dangled at the bedside tonight, tolerated well; ambulated in her room.  Vital drains clear yellow urine in good amounts.  She is breastfeeding her baby utilizing a shield & very happy that its going much better than last time.  FOB present & supportive, bonding well; infant rooming in with parents tonight.  Plan to remove vital in am.  Will continue to monitor & update as needed.

## 2019-03-31 PROCEDURE — 25000132 ZZH RX MED GY IP 250 OP 250 PS 637: Performed by: OBSTETRICS & GYNECOLOGY

## 2019-03-31 PROCEDURE — 12000000 ZZH R&B MED SURG/OB

## 2019-03-31 RX ORDER — ACETAMINOPHEN 325 MG/1
650 TABLET ORAL EVERY 4 HOURS PRN
COMMUNITY
Start: 2019-04-01 | End: 2019-10-18

## 2019-03-31 RX ORDER — IBUPROFEN 200 MG
400-600 TABLET ORAL EVERY 6 HOURS PRN
COMMUNITY
Start: 2019-03-31 | End: 2019-10-18

## 2019-03-31 RX ORDER — OXYCODONE HYDROCHLORIDE 5 MG/1
5-10 TABLET ORAL
Qty: 30 TABLET | Refills: 0 | Status: SHIPPED | OUTPATIENT
Start: 2019-03-31 | End: 2019-04-08

## 2019-03-31 RX ADMIN — ACETAMINOPHEN 975 MG: 325 TABLET, FILM COATED ORAL at 03:18

## 2019-03-31 RX ADMIN — ACETAMINOPHEN 975 MG: 325 TABLET, FILM COATED ORAL at 19:47

## 2019-03-31 RX ADMIN — SIMETHICONE CHEW TAB 80 MG 80 MG: 80 TABLET ORAL at 08:40

## 2019-03-31 RX ADMIN — OXYCODONE HYDROCHLORIDE 5 MG: 5 TABLET ORAL at 01:48

## 2019-03-31 RX ADMIN — OXYCODONE HYDROCHLORIDE 5 MG: 5 TABLET ORAL at 19:47

## 2019-03-31 RX ADMIN — IBUPROFEN 800 MG: 800 TABLET ORAL at 11:02

## 2019-03-31 RX ADMIN — IBUPROFEN 800 MG: 800 TABLET ORAL at 17:29

## 2019-03-31 RX ADMIN — SENNOSIDES AND DOCUSATE SODIUM 1 TABLET: 8.6; 5 TABLET ORAL at 08:41

## 2019-03-31 RX ADMIN — IBUPROFEN 800 MG: 800 TABLET ORAL at 04:20

## 2019-03-31 RX ADMIN — SENNOSIDES AND DOCUSATE SODIUM 1 TABLET: 8.6; 5 TABLET ORAL at 19:47

## 2019-03-31 RX ADMIN — OXYCODONE HYDROCHLORIDE 5 MG: 5 TABLET ORAL at 08:40

## 2019-03-31 RX ADMIN — PRENATAL VIT W/ FE FUMARATE-FA TAB 27-0.8 MG 1 TABLET: 27-0.8 TAB at 08:40

## 2019-03-31 RX ADMIN — OXYCODONE HYDROCHLORIDE 5 MG: 5 TABLET ORAL at 23:12

## 2019-03-31 RX ADMIN — OXYCODONE HYDROCHLORIDE 5 MG: 5 TABLET ORAL at 11:02

## 2019-03-31 RX ADMIN — ACETAMINOPHEN 975 MG: 325 TABLET, FILM COATED ORAL at 11:02

## 2019-03-31 RX ADMIN — IBUPROFEN 800 MG: 800 TABLET ORAL at 23:12

## 2019-03-31 NOTE — PROGRESS NOTES
"PPD # 2    S: patient without complaints.  Ambulating, voiding and pain controlled.    O: /73   Pulse 89   Temp 98.6  F (37  C) (Oral)   Resp 18   Ht 1.638 m (5' 4.49\")   Wt 79 kg (174 lb 3.2 oz)   LMP 2018   SpO2 97%   Breastfeeding? No   BMI 29.45 kg/m     NAD  Abd: soft, nontender, fundus firm  Inc: clean and intact  Ext: calves nontender    A/P PPD # 2 s/p low transverse  section     Routine PP care.    Renetta Saeed M.D.    "

## 2019-03-31 NOTE — PLAN OF CARE
Data: Vital signs within normal limits. Postpartum checks within normal limits - see flow record. Patient eating and drinking normally. Patient able to empty bladder independently. . Patient ambulating independently..   No apparent signs of infection. Incision healing well. Patient is performing self cares and is able to care for infant. Positive attachment behaviors are observed with infant. Support persons are present.  Action:  Pain plan was discussed. Patient would like pain meds to be brought in when they are due. Patient was medicated during the shift for pain and cramping. See MAR.Patient education done about hand hygiene, breastfeeding, formula feeding,  cares, postpartum cares, pain management/plan,  safety and rest. See flow record.  Response:   Patient reassessed within 1 hour after each medication for pain. Patient stated that pain had improved. Patient stated that she was comfortable. .   Plan: Anticipate discharge on 19.

## 2019-03-31 NOTE — PLAN OF CARE
Data: Vital signs within normal limits. Postpartum checks within normal limits - see flow record. Patient eating and drinking normally. Patient able to empty bladder independently and is up ambulating. No apparent signs of infection. Incision healing well. Patient performing self cares and is able to care for infant.  Action: Patient medicated during the shift for pain. See MAR. Patient reassessed within 1 hour after each medication and pain was improved - patient stated she was comfortable. Patient education done about breastfeeding, pain management. See flow record.  Response: Positive attachment behaviors observed with infant. Support persons 1 present.   Plan: Anticipate discharge on 4/1/19.

## 2019-03-31 NOTE — PLAN OF CARE
Patient progressing well.  Ambulating, Eating and voiding well. Independent with mother/baby cares. Bonding well with infant.  Breast feeding is going well, baby is latching well and feeding for good lengths of time. Patient rates incisional Pain tolerable with discomfort.  Taking Ibuprofen oxycodone and scheduled tylenol when due with good relief.  Made plan with patient to bring pain medication when due. Incision clean dry and intact with no redness or drainage. Patient encouraged to report increased bleeding or clots.  rooming in and is supportive and helpful.

## 2019-04-01 VITALS
DIASTOLIC BLOOD PRESSURE: 70 MMHG | RESPIRATION RATE: 16 BRPM | BODY MASS INDEX: 29.74 KG/M2 | HEIGHT: 64 IN | TEMPERATURE: 98.4 F | SYSTOLIC BLOOD PRESSURE: 118 MMHG | HEART RATE: 82 BPM | OXYGEN SATURATION: 97 % | WEIGHT: 174.2 LBS

## 2019-04-01 PROCEDURE — 25000132 ZZH RX MED GY IP 250 OP 250 PS 637: Performed by: OBSTETRICS & GYNECOLOGY

## 2019-04-01 RX ADMIN — OXYCODONE HYDROCHLORIDE 5 MG: 5 TABLET ORAL at 02:59

## 2019-04-01 RX ADMIN — SENNOSIDES AND DOCUSATE SODIUM 1 TABLET: 8.6; 5 TABLET ORAL at 08:22

## 2019-04-01 RX ADMIN — ACETAMINOPHEN 975 MG: 325 TABLET, FILM COATED ORAL at 02:58

## 2019-04-01 RX ADMIN — OXYCODONE HYDROCHLORIDE 5 MG: 5 TABLET ORAL at 08:22

## 2019-04-01 RX ADMIN — ACETAMINOPHEN 650 MG: 325 TABLET, FILM COATED ORAL at 08:22

## 2019-04-01 NOTE — PLAN OF CARE
Patient discharged per wheelchair with infant in car seat. Mother verified that her band matches her infant's band by comparing the infant's  MR#.  Discharge instructions given. Encouraged to call for any problems, questions or concerns. RXs picked up from pharmacy by .

## 2019-04-01 NOTE — DISCHARGE SUMMARY
DELIVERY DISCHARGE SUMMARY    Admit date: 3/29/2019  Discharge date: 2019     Admit Dx:   - 28 year old y/o  at 39w1d   - History of  section   - asthma    Discharge Dx:  - Same as above, s/p repeat low transverse  section    Procedures:  - repeat low transverse  section with double layer closure via Pfannenstiel incision  - Spinal analgesia    Admit HPI:  Ms. Fatimah Roy is a 28 year old  at 39w1d who was admitted on 3/29/2019 for repeat .  Please see her admit H&P for full details of her PMH, PSH, Meds, Allergies and exam on admit.    Hospital course:  After discussion of risk and and benefits and signing informed consent the patient was taken to the operating room for repeat  section.    Indications for : Fatimah Roy is a 28 year old  2 Para 1001 who [facility] 39 and 1/7 weeks for repeat  section due to prior .  Patient understood risks and benefits including risks of bleeding, infection and damage to surrounding structures.  Patient agreed to proceed.     viable infant female in vertex presentation weighing 7 lb 4oz with Apgars of  9 at 1 minute and 9 at 5 minutes.  Clear amniotic fluid.  Placenta with 3 vessel cord.  Normal uterus, tubes and ovaries     EBL from the delivery was 131cc. Please see her  Section Operative Note for full details regarding her delivery.    Her postoperative course was complicated by nothing. On POD#3, she was meeting all of her postpartum goals and deemed stable for discharge. She was voiding without difficulty, tolerating a regular diet without nausea and vomiting, her pain was well controlled on oral pain medicines and her lochia was appropriate. Her hemoglobin after delivery was 10.4. Her Rh status was pos and Rhogam was not indicated. At the time of discharge, she was breast feeding her infant and undecided for contraception.     Physical exam on  the day of discharge:  Vitals:    03/30/19 1957 03/31/19 0310 03/31/19 2345 04/01/19 0810   BP: 124/64 115/73 129/75 118/70   Pulse: 89  87 82   Resp: 16 18 16 16   Temp: 97.8  F (36.6  C) 98.6  F (37  C) 98.8  F (37.1  C) 98.4  F (36.9  C)   TempSrc: Oral Oral Oral Oral   SpO2:  97%     Weight:       Height:           General: sitting up, alert and cooperative  Abd: soft, non-distended, non-tender. Fundus firm, nontender, 2 cm below umbilicus.   Incision clean/dry/intact with dermabond in place.  Extremities: calves nontender, no edema of lower extremities bilaterally    Lab Results   Component Value Date    HGB 10.4 03/30/2019    HGB 12.4 03/29/2019     Blood type:   Lab Results   Component Value Date    RH Pos 03/29/2019       Discharge/Disposition:  Fatimah Roy was discharged to home in stable condition with the following instructions/medications:  1) Call for temperature > 100.4, foul smelling vaginal discharge, bleeding > 1 pad per hour x 2 hrs, pain not controlled by oral pain meds, severe constipation or severe nausea or vomiting.  2) She received contraceptive counseling.  3) She was instructed to follow-up with her primary OB in 6 weeks for a routine postpartum visit.  4) She was instructed to continue her PNV on discharge if she wished to breast feed her infant.  5) She was discharged home with the following medications: ibuprofen, norco    Chey Gilbert MD, MPH  [facility]

## 2019-04-01 NOTE — DISCHARGE INSTRUCTIONS
Postpartum Vaginal Delivery Instructions    Activity       Ask family and friends for help when you need it.    Do not place anything in your vagina for 6 weeks.    You are not restricted on other activities, but take it easy for a few weeks to allow your body to recover from delivery.  You are able to do any activities you feel up to that point.    No driving until you have stopped taking your pain medications (usually two weeks after delivery).     Call your health care provider if you have any of these symptoms:       Increased pain, swelling, redness, or fluid around your stiches from an episiotomy or perineal tear.    A fever above 100.4 F (38 C) with or without chills when placing a thermometer under your tongue.    You soak a sanitary pad with blood within 1 hour, or you see blood clots larger than a golf ball.    Bleeding that lasts more than 6 weeks.    Vaginal discharge that smells bad.    Severe pain, cramping or tenderness in your lower belly area.    A need to urinate more frequently (use the toilet more often), more urgently (use the toilet very quickly), or it burns when you urinate.    Nausea and vomiting.    Redness, swelling or pain around a vein in your leg.    Problems breastfeeding or a red or painful area on your breast.    Chest pain and cough or are gasping for air.    Problems coping with sadness, anxiety, or depression.  If you have any concerns about hurting yourself or the baby, call your provider immediately.     You have questions or concerns after you return home.     Keep your hands clean:  Always wash your hands before touching your perineal area and stitches.  This helps reduce your risk of infection.  If your hands aren't dirty, you may use an alcohol hand-rub to clean your hands. Keep your nails clean and short.      For problems or questions with breastfeeding after discharge call: [phone] at the Peds clinic.  After hours you may leave a message and your call will returned  the next morning. You may also call the [facility] to answer questions, [phone].    If you have concerns/questions after returning home, contact the nurse triage line [phone] or your primary care clinic.    CALL FOR FOLLOW UP APPOINTMENT WITH OB/GYN TO BE SEEN IN 6 WEEKS.  [phone].

## 2019-04-05 ENCOUNTER — MYC REFILL (OUTPATIENT)
Dept: OBGYN | Facility: CLINIC | Age: 29
End: 2019-04-05

## 2019-04-05 DIAGNOSIS — Z98.891 S/P C-SECTION: ICD-10-CM

## 2019-04-05 NOTE — TELEPHONE ENCOUNTER
Rx refill request for Oxycodone 5mg.  Date of last issue was 03-31-19 for a qty of 30 with 0 refills.    To call provider to review and authorize refill.    Erica Cummins  [facility] RN

## 2019-04-10 RX ORDER — OXYCODONE HYDROCHLORIDE 5 MG/1
5-10 TABLET ORAL
Qty: 30 TABLET | Refills: 0 | Status: CANCELLED | OUTPATIENT
Start: 2019-04-10

## 2019-05-02 ENCOUNTER — OFFICE VISIT (OUTPATIENT)
Dept: FAMILY MEDICINE | Facility: CLINIC | Age: 29
End: 2019-05-02
Payer: COMMERCIAL

## 2019-05-02 VITALS
TEMPERATURE: 103.6 F | OXYGEN SATURATION: 95 % | SYSTOLIC BLOOD PRESSURE: 104 MMHG | WEIGHT: 167 LBS | BODY MASS INDEX: 27.82 KG/M2 | DIASTOLIC BLOOD PRESSURE: 72 MMHG | HEART RATE: 127 BPM | RESPIRATION RATE: 18 BRPM | HEIGHT: 65 IN

## 2019-05-02 DIAGNOSIS — N61.0 ACUTE MASTITIS: Primary | ICD-10-CM

## 2019-05-02 PROCEDURE — 99213 OFFICE O/P EST LOW 20 MIN: CPT | Performed by: NURSE PRACTITIONER

## 2019-05-02 RX ORDER — DICLOXACILLIN SODIUM 250 MG
250 CAPSULE ORAL 4 TIMES DAILY
Qty: 40 CAPSULE | Refills: 0 | Status: SHIPPED | OUTPATIENT
Start: 2019-05-02 | End: 2019-05-28

## 2019-05-02 ASSESSMENT — MIFFLIN-ST. JEOR: SCORE: 1475.45

## 2019-05-02 NOTE — PATIENT INSTRUCTIONS
1. Acute mastitis  Acute, stable  - dicloxacillin (DYNAPEN) 250 MG capsule; Take 1 capsule (250 mg) by mouth 4 times daily for 10 days  Dispense: 40 capsule; Refill: 0  Push fluids  Follow instructions below  Alternate Tylenol and Ibuprofen as needed      Patient Education     Mastitis  Mastitis occurs when breast tissue becomes swollen and inflamed. This is almost always due to infection. Mastitis most often affects breastfeeding women during the first 6 weeks after childbirth. For this reason, it s also known as lactation mastitis. Infection may happen after a duct becomes clogged, causing milk to back up in the breast. Mastitis may also occur if bacteria enter the breast through small cracks in the nipple. (Less often, mastitis occurs in women who aren t breastfeeding. If you have mastitis that is not due to breastfeeding, your healthcare provider will give you more information as needed. Treatment may include some of the same home care measures listed below.)  Mastitis may cause flu-like symptoms such as fever, aches, and fatigue. The affected breast may feel painful, warm, tender, firm, or swollen. The skin over the breast may be red (often in a wedge-shaped pattern). You may feel a burning a sensation when breastfeeding.  In most cases, mastitis can be treated with antibiotics. This should clear the infection. If treatment is delayed, a pocket of pus (abscess) can form in the breast tissue. A procedure may then be needed to drain the pus. In severe cases of infection, other treatments may be needed.  Home care  Breastfeeding    It s very important to keep the milk flowing from the infected breast. Continue breastfeeding from both breasts as usual. This will not hurt the baby. If this is too painful, use a breast pump to remove milk from the infected side. This can be fed to your baby or discarded. Note: If you don't continue to breastfeed or pump your breast, bacteria can grow in the milk that is left in  your breast. This can make your infection worse.    Tell your healthcare provider if you have problems with breastfeeding. He or she may suggest changes to your technique, if needed. You may also be referred to a lactation nurse or consultant for support with breastfeeding.  General care    Take any medicines you re prescribed as directed. If you re taking antibiotics, be sure to complete all of the medicine even if you start to feel better. Over-the-counter pain medicines may also be recommended. Don t use breast creams or other products or medicines without talking to your healthcare provider first. Note: If you re concerned about taking medicines while breastfeeding, talk to your healthcare provider.    Rest as often as needed. Also be sure to drink plenty of fluids.    To help relieve pain and swelling, heat or ice may be used. Apply as often as directed by your provider.  ? Heat: Place a warm compress on the breast. Use a towel soaked in hot water, a heating pad, or a hot water bottle.  ? Cold: Place a cold compress on the breast. Use an ice pack or bag of ice wrapped in a thin towel. Never place a cold source directly on the skin.  Follow-up care  Follow up with your healthcare provider as advised.  When to seek medical advice  Call your healthcare provider right away if any of these occur:    Fever of 100.4 F (38 C) or higher, or as directed by your provider    Shaking chills    Worsening symptoms or symptoms that don't improve within 48 to 72 hours of starting treatment    New symptoms develop  Date Last Reviewed: 6/1/2018 2000-2018 [employer]. [address] [place] [address], [place] [address]. All rights reserved. This information is not intended as a substitute for professional medical care. Always follow your healthcare professional's instructions.

## 2019-05-02 NOTE — PROGRESS NOTES
SUBJECTIVE:   Fatimah Roy is a 28 year old female who presents to clinic today for the following   health issues:        Breast Pain       Duration: 2 days     Description (location/character/radiation): both are tender- but the right breast is pretty painful     Intensity:  moderate    Accompanying signs and symptoms: fever, body aches, shortness of breath- has had to use her inhaler quite a few times. Has had to use it more frequently.      History (similar episodes/previous evaluation): None    Precipitating or alleviating factors: Is currently breast feeding- baby is one month old    Therapies tried and outcome: ibuprofen       PCP   Pedro Luis Leavitt [phone]    Health Maintenance        Health Maintenance Due   Topic Date Due     PREVENTIVE CARE VISIT  2015     ASTHMA ACTION PLAN Q1 YR  2018     ASTHMA CONTROL TEST Q6 MOS  2018     PHQ-2  2019     PAP Q3 YR  2019       HPI        Patient Active Problem List   Diagnosis     CARDIOVASCULAR SCREENING; LDL GOAL LESS THAN 160     Intermittent asthma     Seasonal allergic rhinitis     Chronic allergic conjunctivitis     S/P      Prenatal care, subsequent pregnancy     Current Outpatient Medications   Medication     acetaminophen (TYLENOL) 325 MG tablet     albuterol (PROAIR HFA/PROVENTIL HFA/VENTOLIN HFA) 108 (90 Base) MCG/ACT inhaler     Calcium Carbonate Antacid (TUMS PO)     cetirizine (ZYRTEC) 10 MG tablet     ibuprofen (ADVIL/MOTRIN) 200 MG tablet     Prenatal Vit-Fe Fumarate-FA (PRENATAL MULTIVITAMIN PLUS IRON) 27-0.8 MG TABS per tablet     No current facility-administered medications for this visit.        Reviewed and updated:  Tobacco  Allergies  Meds  Med Hx  Surg Hx  Fam Hx  Soc Hx     ROS:  Constitutional, HEENT, cardiovascular, pulmonary, gi and gu systems are negative, except as otherwise noted.    PHYSICAL EXAM   /72 (Cuff Size: Adult Regular)   Pulse 127   Temp 103.6  F (39.8  C)  "(Tympanic)   Resp 18   Ht 1.638 m (5' 4.5\")   Wt 75.8 kg (167 lb)   LMP 06/28/2018   SpO2 95%   Breastfeeding? Yes   BMI 28.22 kg/m    Body mass index is 28.22 kg/m .  GENERAL APPEARANCE: healthy, alert and no distress  NECK: no adenopathy, no asymmetry, masses, or scars and thyroid normal to palpation  RESP: lungs clear to auscultation - no rales, rhonchi or wheezes  BREAST: no palpable axillary masses or adenopathy,RIGHT:  tenderness right from erythema from6 o'clock to 9 o'clock and warmth right  CV: regular rates and rhythm, normal S1 S2, no S3 or S4 and no murmur, click or rub  MS: extremities normal- no gross deformities noted  PSYCH: mentation appears normal and affect normal/bright    ASSESSMENT & PLAN   Aracelis Lees OB RN spoke with patient about lactation a little bit today    1. Acute mastitis  Acute, stable  - dicloxacillin (DYNAPEN) 250 MG capsule; Take 1 capsule (250 mg) by mouth 4 times daily for 10 days  Dispense: 40 capsule; Refill: 0  Continue breast feeding  Push fluids  Alternate Tylenol and Ibuprofen as needed  Massage if comfortable  Monitor baby for thrush  Follow instructions below    Patient Education     Mastitis  Mastitis occurs when breast tissue becomes swollen and inflamed. This is almost always due to infection. Mastitis most often affects breastfeeding women during the first 6 weeks after childbirth. For this reason, it s also known as lactation mastitis. Infection may happen after a duct becomes clogged, causing milk to back up in the breast. Mastitis may also occur if bacteria enter the breast through small cracks in the nipple. (Less often, mastitis occurs in women who aren t breastfeeding. If you have mastitis that is not due to breastfeeding, your healthcare provider will give you more information as needed. Treatment may include some of the same home care measures listed below.)  Mastitis may cause flu-like symptoms such as fever, aches, and fatigue. The affected breast " may feel painful, warm, tender, firm, or swollen. The skin over the breast may be red (often in a wedge-shaped pattern). You may feel a burning a sensation when breastfeeding.  In most cases, mastitis can be treated with antibiotics. This should clear the infection. If treatment is delayed, a pocket of pus (abscess) can form in the breast tissue. A procedure may then be needed to drain the pus. In severe cases of infection, other treatments may be needed.  Home care  Breastfeeding    It s very important to keep the milk flowing from the infected breast. Continue breastfeeding from both breasts as usual. This will not hurt the baby. If this is too painful, use a breast pump to remove milk from the infected side. This can be fed to your baby or discarded. Note: If you don't continue to breastfeed or pump your breast, bacteria can grow in the milk that is left in your breast. This can make your infection worse.    Tell your healthcare provider if you have problems with breastfeeding. He or she may suggest changes to your technique, if needed. You may also be referred to a lactation nurse or consultant for support with breastfeeding.  General care    Take any medicines you re prescribed as directed. If you re taking antibiotics, be sure to complete all of the medicine even if you start to feel better. Over-the-counter pain medicines may also be recommended. Don t use breast creams or other products or medicines without talking to your healthcare provider first. Note: If you re concerned about taking medicines while breastfeeding, talk to your healthcare provider.    Rest as often as needed. Also be sure to drink plenty of fluids.    To help relieve pain and swelling, heat or ice may be used. Apply as often as directed by your provider.  ? Heat: Place a warm compress on the breast. Use a towel soaked in hot water, a heating pad, or a hot water bottle.  ? Cold: Place a cold compress on the breast. Use an ice pack or bag  of ice wrapped in a thin towel. Never place a cold source directly on the skin.  Follow-up care  Follow up with your healthcare provider as advised.  When to seek medical advice  Call your healthcare provider right away if any of these occur:    Fever of 100.4 F (38 C) or higher, or as directed by your provider    Shaking chills    Worsening symptoms or symptoms that don't improve within 48 to 72 hours of starting treatment    New symptoms develop  Date Last Reviewed: 6/1/2018 2000-2018 [employer]. [address] [place], [place] [id number]. All rights reserved. This information is not intended as a substitute for professional medical care. Always follow your healthcare professional's instructions.             Risks, benefits, side effects and rationale for treatment plan fully discussed with the patient and understanding expressed.    [facility]IG[facility]

## 2019-05-02 NOTE — NURSING NOTE
"Chief Complaint   Patient presents with     Breast Problem       Initial /72 (Cuff Size: Adult Regular)   Pulse 127   Temp 103.6  F (39.8  C) (Tympanic)   Resp 18   Ht 1.638 m (5' 4.5\")   Wt 75.8 kg (167 lb)   LMP 06/28/2018   SpO2 95%   Breastfeeding? Yes   BMI 28.22 kg/m   Estimated body mass index is 28.22 kg/m  as calculated from the following:    Height as of this encounter: 1.638 m (5' 4.5\").    Weight as of this encounter: 75.8 kg (167 lb).    Patient presents to the clinic using No DME    Health Maintenance that is potentially due pending provider review:  NONE    n/a    Is there anyone who you would like to be able to receive your results? Not Applicable  If yes have patient fill out ARI    "

## 2019-05-10 ENCOUNTER — PRENATAL OFFICE VISIT (OUTPATIENT)
Dept: OBGYN | Facility: CLINIC | Age: 29
End: 2019-05-10
Payer: COMMERCIAL

## 2019-05-10 VITALS
DIASTOLIC BLOOD PRESSURE: 70 MMHG | TEMPERATURE: 96.9 F | WEIGHT: 165 LBS | BODY MASS INDEX: 27.49 KG/M2 | HEART RATE: 75 BPM | SYSTOLIC BLOOD PRESSURE: 103 MMHG | RESPIRATION RATE: 16 BRPM | HEIGHT: 65 IN

## 2019-05-10 DIAGNOSIS — Z12.4 PAP SMEAR FOR CERVICAL CANCER SCREENING: ICD-10-CM

## 2019-05-10 PROBLEM — Z34.80 PRENATAL CARE, SUBSEQUENT PREGNANCY: Status: RESOLVED | Noted: 2018-08-20 | Resolved: 2019-05-10

## 2019-05-10 PROBLEM — Z98.891 S/P C-SECTION: Status: RESOLVED | Noted: 2017-03-24 | Resolved: 2019-05-10

## 2019-05-10 PROCEDURE — G0145 SCR C/V CYTO,THINLAYER,RESCR: HCPCS | Performed by: OBSTETRICS & GYNECOLOGY

## 2019-05-10 PROCEDURE — 99207 ZZC POST PARTUM EXAM: CPT | Performed by: OBSTETRICS & GYNECOLOGY

## 2019-05-10 ASSESSMENT — ANXIETY QUESTIONNAIRES
6. BECOMING EASILY ANNOYED OR IRRITABLE: NOT AT ALL
7. FEELING AFRAID AS IF SOMETHING AWFUL MIGHT HAPPEN: NOT AT ALL
GAD7 TOTAL SCORE: 0
2. NOT BEING ABLE TO STOP OR CONTROL WORRYING: NOT AT ALL
3. WORRYING TOO MUCH ABOUT DIFFERENT THINGS: NOT AT ALL
1. FEELING NERVOUS, ANXIOUS, OR ON EDGE: NOT AT ALL
5. BEING SO RESTLESS THAT IT IS HARD TO SIT STILL: NOT AT ALL

## 2019-05-10 ASSESSMENT — PATIENT HEALTH QUESTIONNAIRE - PHQ9
5. POOR APPETITE OR OVEREATING: NOT AT ALL
SUM OF ALL RESPONSES TO PHQ QUESTIONS 1-9: 5

## 2019-05-10 ASSESSMENT — MIFFLIN-ST. JEOR: SCORE: 1466.38

## 2019-05-10 NOTE — PROGRESS NOTES
"Fatimah is here for a 6-week postpartum checkup.    She had a Repeat  of a liveborn baby girl, weight 7 pounds 4 oz.  The delivery was uncomplicated.  Since delivery, she has been breast feeding.  She has not had a normal menses.  She has not had intercourse.  Patient screened for postpartum depression and complaints are NEGATIVE. Screening has also been completed for intimate partner violence.     Her last pap was  and was Normal    EXAM: /70 (BP Location: Left arm, Patient Position: Chair, Cuff Size: Adult Regular)   Pulse 75   Temp 96.9  F (36.1  C) (Tympanic)   Resp 16   Ht 1.638 m (5' 4.5\")   Wt 74.8 kg (165 lb)   Breastfeeding? Yes   BMI 27.88 kg/m      HEENT: grossly normal.  NECK: no lymphadenopathy or thyromegaly.  ABDOMEN: soft, non tender, good bowel sounds, without masses, rebound, guarding or tenderness.  INC: well healed   EXTREMITIES: Warm to touch, no ankle edema or calf tenderness.    PELVIC:    External genitalia: normal without lesion   Vagina: normal mucosa and rugae, normal discharge.  Cervix: normal without lesion.  Uterus: small, mobile, nontender.  Adnexa: no masses, no tenderness  Rectal: deferred, external hemorrhoids absent    A/P  Routine Postpartum    1. Contraception: condom  2. Pap done today    Renetta Saeed M.D.   "

## 2019-05-10 NOTE — NURSING NOTE
"Initial /70 (BP Location: Left arm, Patient Position: Chair, Cuff Size: Adult Regular)   Pulse 75   Temp 96.9  F (36.1  C) (Tympanic)   Resp 16   Ht 1.638 m (5' 4.5\")   Wt 74.8 kg (165 lb)   Breastfeeding? Yes   BMI 27.88 kg/m   Estimated body mass index is 27.88 kg/m  as calculated from the following:    Height as of this encounter: 1.638 m (5' 4.5\").    Weight as of this encounter: 74.8 kg (165 lb). .    Brinda Rosa, [facility]    "

## 2019-05-11 ASSESSMENT — ANXIETY QUESTIONNAIRES: GAD7 TOTAL SCORE: 0

## 2019-05-14 LAB
COPATH REPORT: NORMAL
PAP: NORMAL

## 2019-05-28 ENCOUNTER — OFFICE VISIT (OUTPATIENT)
Dept: FAMILY MEDICINE | Facility: CLINIC | Age: 29
End: 2019-05-28
Payer: COMMERCIAL

## 2019-05-28 VITALS
DIASTOLIC BLOOD PRESSURE: 78 MMHG | RESPIRATION RATE: 18 BRPM | HEART RATE: 65 BPM | WEIGHT: 171.9 LBS | TEMPERATURE: 97.9 F | SYSTOLIC BLOOD PRESSURE: 100 MMHG | BODY MASS INDEX: 29.05 KG/M2 | OXYGEN SATURATION: 97 %

## 2019-05-28 DIAGNOSIS — M54.50 ACUTE BILATERAL LOW BACK PAIN WITHOUT SCIATICA: Primary | ICD-10-CM

## 2019-05-28 DIAGNOSIS — S39.012A LOW BACK STRAIN, INITIAL ENCOUNTER: ICD-10-CM

## 2019-05-28 LAB
ALBUMIN UR-MCNC: ABNORMAL MG/DL
APPEARANCE UR: CLEAR
BACTERIA #/AREA URNS HPF: ABNORMAL /HPF
BILIRUB UR QL STRIP: ABNORMAL
COLOR UR AUTO: YELLOW
GLUCOSE UR STRIP-MCNC: NEGATIVE MG/DL
HGB UR QL STRIP: NEGATIVE
KETONES UR STRIP-MCNC: ABNORMAL MG/DL
LEUKOCYTE ESTERASE UR QL STRIP: NEGATIVE
NITRATE UR QL: NEGATIVE
NON-SQ EPI CELLS #/AREA URNS LPF: ABNORMAL /LPF
PH UR STRIP: 6.5 PH (ref 5–7)
RBC #/AREA URNS AUTO: ABNORMAL /HPF
SOURCE: ABNORMAL
SP GR UR STRIP: 1.02 (ref 1–1.03)
UROBILINOGEN UR STRIP-ACNC: 0.2 EU/DL (ref 0.2–1)
WBC #/AREA URNS AUTO: ABNORMAL /HPF

## 2019-05-28 PROCEDURE — 99213 OFFICE O/P EST LOW 20 MIN: CPT | Performed by: NURSE PRACTITIONER

## 2019-05-28 PROCEDURE — 81001 URINALYSIS AUTO W/SCOPE: CPT | Performed by: NURSE PRACTITIONER

## 2019-05-28 RX ORDER — CYCLOBENZAPRINE HCL 5 MG
5 TABLET ORAL 3 TIMES DAILY PRN
Qty: 30 TABLET | Refills: 0 | Status: SHIPPED | OUTPATIENT
Start: 2019-05-28 | End: 2019-10-18

## 2019-05-28 ASSESSMENT — PAIN SCALES - GENERAL: PAINLEVEL: EXTREME PAIN (8)

## 2019-05-28 NOTE — PATIENT INSTRUCTIONS
Start Flexeril up to three times daily - use sparingly and if needed     Monitor baby for any changes in behavior     Ice back as much as able 20 minutes/20 minutes off     Ibuprofen up to three times daily as well    Do stretches and strengthening as in instructions - including chair stretch     See me back in 1 week if not better and will do xray and physical therapy     Patient Education     Back Exercises: Back Press    Do this exercise on your hands and knees. Keep your knees under your hips and your hands under your shoulders. Keep your spine in a neutral position (not arched or sagging). Be sure to maintain your neck s natural curve:    Tighten your stomach and buttock muscles to press your back upward. Let your head drop slightly.    Hold for 5 seconds. Return to starting position.    Repeat 5 times.  Date Last Reviewed: 3/1/2018    [id number] [employer]. [address] [place] [address]. All rights reserved. This information is not intended as a substitute for professional medical care. Always follow your healthcare professional's instructions.           Patient Education     Back Exercises: Back Release  Do this exercise on your hands and knees. Keep your knees under your hips and your hands under your shoulders.        Relax your abdominal and buttocks muscles, lift your head, and let your back sag. Be sure to keep your weight evenly distributed. Don t sit back on your hips.     Hold for 5 seconds.    Return to starting position.    Tuck your head and lift (arch) your back.    Hold for 5 seconds    Return to starting position.    Repeat 5 times.  Date Last Reviewed: 3/1/2018    3452-0066 [employer]. [address] [place] [address]. All rights reserved. This information is not intended as a substitute for professional medical care. Always follow your healthcare professional's instructions.           Patient Education     Back Exercises: Leg Pull    To start,  lie on your back with your knees bent and feet flat on the floor. Don t press your neck or lower back to the floor. Breathe deeply. You should feel comfortable and relaxed in this position.    Pull one knee to your chest.    Hold for 30 to 60 seconds. Return to starting position.    Repeat 2 times.    Switch legs.    For a double leg pull, pull both legs to your chest at the same time. Repeat 2 times.  For your safety, check with your healthcare provider before starting an exercise program.  Date Last Reviewed: 3/1/2018    [id number] [employer]. [address] [place] [address]. All rights reserved. This information is not intended as a substitute for professional medical care. Always follow your healthcare professional's instructions.           Patient Education     Back Exercises: Lower Back Rotation    To start, lie on your back with your knees bent and feet flat on the floor. Don t press your neck or lower back to the floor. Breathe deeply. You should feel comfortable and relaxed in this position.    Drop both knees to one side. Turn your head to the other side. Keep your shoulders flat on the floor.    Do not push through pain.    Hold for 20 seconds.    Slowly switch sides.    Repeat 2 to 5 times.  Date Last Reviewed: 3/1/2018    3426-7642 [employer]. [address] [place] [address]. All rights reserved. This information is not intended as a substitute for professional medical care. Always follow your healthcare professional's instructions.           Patient Education     Back Exercises: Lower Back Stretch    To start, sit in a chair with your feet flat on the floor. Shift your weight slightly forward. Relax, and keep your ears, shoulders, and hips aligned while you do the following:    Sit with your feet well apart.    Bend forward and touch the floor with the backs of your hands. Relax and let your body drop.    Hold for 20 seconds. Return to starting  position.    Repeat 2 times.   Date Last Reviewed: 11/1/2017 2000-2018 [employer]. [address] [place] [address]. All rights reserved. This information is not intended as a substitute for professional medical care. Always follow your healthcare professional's instructions.           Patient Education     Back Exercises: Pelvic Tilt    To start, lie on your back with your knees bent and feet flat on the floor. Don t press your neck or lower back to the floor. Breathe deeply. You should feel comfortable and relaxed in this position:    Tighten your stomach and buttocks, and press your lower back toward the floor. This should be a small, subtle movement. This should not increase your pain.    Hold for 5 to 15 seconds. Release.    Repeat 2 to 5 times.  Date Last Reviewed: 3/1/2018    [id number] [employer]. [address] [place] [address]. All rights reserved. This information is not intended as a substitute for professional medical care. Always follow your healthcare professional's instructions.           Patient Education     Back Exercises: Seated Rotation    To start, sit in a chair with your feet flat on the floor. Shift your weight slightly forward to avoid rounding your back. Relax, and keep your ears, shoulders, and hips aligned:    Fold your arms and elbows just below shoulder height.    Turn from the waist with hips forward. Turn your head last. Do not push through the pain.    Hold for a count of 10 to 30. Return to starting position.    Repeat 3 to 5 times on one side. Then switch sides.  Date Last Reviewed: 3/1/2018    [id number] [employer]. [address] [place] [address]. All rights reserved. This information is not intended as a substitute for professional medical care. Always follow your healthcare professional's instructions.

## 2019-05-28 NOTE — PROGRESS NOTES
SUBJECTIVE   Fatimah Roy is a  female who presents to clinic today for the following health issue(s):       Back Pain       Duration: 2 weeks ago        Specific cause: none  Possibly after walking    Description:   Location of pain: low back both and ribs  Character of pain: sharp, spasm  Pain radiation:none  New numbness or weakness in legs, not attributed to pain:  no     Intensity: Currently 8/10    History:   Pain interferes with job: YES  History of back problems: no prior back problems  Any previous MRI or X-rays: None  Sees a specialist for back pain:  No  Therapies tried without relief: cold and heat  IBU and tylenol, Aspercreme     Alleviating factors:   Improved by: 0      Precipitating factors:  Worsened by: Nothing    Accompanying Signs & Symptoms:  Risk of Fracture:  None  Risk of Cauda Equina:  None  Risk of Infection:  None  Risk of Cancer:  None  Risk of Ankylosing Spondylitis:  Onset at age <35, male, AND morning back stiffness. no         2 weeks ago did go for a hike, pretty low key  Back was a little sore that night, that Friday morning had difficulty standing up straight, by Saturday needed help getting out of bed   Has trialed multiple over the counter remedies without much relief    Getting worse, feels like it's affecting her ribs, hurts to turn certain ways   Started lower and moved up - R> L  Urinating well, bowel movement's normal - no constipation issues   Only injury she recalls was like 5 or 6 fell on concrete, otherwise no injury history    Denies loss of bowel and bladder control or saddle pareasthesias     PCP   Pedro Luis Leavitt -[phone]    Health Maintenance        Health Maintenance Due   Topic Date Due     ASTHMA CONTROL TEST  1989     PREVENTIVE CARE VISIT  02/27/2015       HPI        Patient Active Problem List   Diagnosis     CARDIOVASCULAR SCREENING; LDL GOAL LESS THAN 160     Intermittent asthma     Seasonal allergic rhinitis     Chronic allergic  "conjunctivitis     Current Outpatient Medications   Medication     acetaminophen (TYLENOL) 325 MG tablet     albuterol (PROAIR HFA/PROVENTIL HFA/VENTOLIN HFA) 108 (90 Base) MCG/ACT inhaler     Calcium Carbonate Antacid (TUMS PO)     cetirizine (ZYRTEC) 10 MG tablet     cyclobenzaprine (FLEXERIL) 5 MG tablet     ibuprofen (ADVIL/MOTRIN) 200 MG tablet     Prenatal Vit-Fe Fumarate-FA (PRENATAL MULTIVITAMIN PLUS IRON) 27-0.8 MG TABS per tablet     No current facility-administered medications for this visit.        Reviewed and updated as needed this visit by Provider:  Tobacco  Allergies  Meds  Med Hx  Surg Hx  Fam Hx  Soc Hx     ROS:  Constitutional, HEENT, cardiovascular, pulmonary, gi and gu systems are negative, except as otherwise noted.    PHYSICAL EXAM   /78   Pulse 65   Temp 97.9  F (36.6  C)   Resp 18   Wt 78 kg (171 lb 14.4 oz)   SpO2 97%   Breastfeeding? Yes   BMI 29.05 kg/m    Body mass index is 29.05 kg/m .  GENERAL APPEARANCE: healthy, alert and mild distress  RESP: lungs clear to auscultation - no rales, rhonchi or wheezes  CV: regular rates and rhythm, normal S1 S2, no S3 or S4 and no murmur, click or rub  ABDOMEN: soft, nontender, without hepatosplenomegaly or masses and bowel sounds normal  ORTHO: Lumber/Thoracic Spine Exam: Tender:  right parathoracic muscles, right para lumbar muscles  Non-tender:  thoracic spinous processes, lumbar spinous processes, left sciatic notch, right sciatic notch  Range of Motion:  lumbar flexion  decreased, painful, lumbar extension  decreased, painful, left lateral lumbar bending  full, pain-free - feels a \"good stretch\", right lateral lumbar bending  decreased, pain-free, left lateral lumbar rotation  full, pain-free, right lateral lumbar rotation  full, pain-free  Strength:  able to heel walk, able to toe walk, gastrocsoleus   5/5, hamstrings  5/5, quadriceps  5/5, tibialis anterior  5/5, peroneals  5/5  Special tests:  negative straight leg " "raises        ASSESSMENT & PLAN   Assessment & Plan     1. Acute bilateral low back pain without sciatica  Patient with acute low back pain without radiation. Patient is almost 2 weeks s/p , went for hike approximately 2 weeks ago and symptoms started that evening. No significant trauma or injury. Denies any saddle paraesthesias or loss of bowel and bladder control, negative straight leg raises. Pain with twisting motions and does report spasms. Will get urine to rule out Urinary Tract Infection, this was negative. Pain consistent with low back strain. Will treat conservatively with NSAIDs and sparse use of muscle relaxer due to breast feeding. Ice and stretches/strengthening discussed with patient. Patient to follow up if symptoms not improved after approximately 1 week - may start physical therapy.   - *UA reflex to Microscopic and Culture ([facility] (except [place] and [place])    2. Low back strain, initial encounter  See above.   - cyclobenzaprine (FLEXERIL) 5 MG tablet; Take 1 tablet (5 mg) by mouth 3 times daily as needed for muscle spasms  Dispense: 30 tablet; Refill: 0  - Urine Microscopic     BMI:   Estimated body mass index is 29.05 kg/m  as calculated from the following:    Height as of 5/10/19: 1.638 m (5' 4.5\").    Weight as of this encounter: 78 kg (171 lb 14.4 oz).   Weight management plan: Discussed healthy diet and exercise guidelines        Patient Instructions     Start Flexeril up to three times daily - use sparingly and if needed     Monitor baby for any changes in behavior     Ice back as much as able 20 minutes/20 minutes off     Ibuprofen up to three times daily as well    Do stretches and strengthening as in instructions - including chair stretch     See me back in 1 week if not better and will do xray and physical therapy     Patient Education     Back Exercises: Back Press    Do this exercise on your hands and knees. Keep your knees under your hips and your " hands under your shoulders. Keep your spine in a neutral position (not arched or sagging). Be sure to maintain your neck s natural curve:    Tighten your stomach and buttock muscles to press your back upward. Let your head drop slightly.    Hold for 5 seconds. Return to starting position.    Repeat 5 times.  Date Last Reviewed: 3/1/2018    [id number] [employer]. [address] [place] [address]. All rights reserved. This information is not intended as a substitute for professional medical care. Always follow your healthcare professional's instructions.           Patient Education     Back Exercises: Back Release  Do this exercise on your hands and knees. Keep your knees under your hips and your hands under your shoulders.        Relax your abdominal and buttocks muscles, lift your head, and let your back sag. Be sure to keep your weight evenly distributed. Don t sit back on your hips.     Hold for 5 seconds.    Return to starting position.    Tuck your head and lift (arch) your back.    Hold for 5 seconds    Return to starting position.    Repeat 5 times.  Date Last Reviewed: 3/1/2018    9568-7020 [employer]. [address] [place] [address]. All rights reserved. This information is not intended as a substitute for professional medical care. Always follow your healthcare professional's instructions.           Patient Education     Back Exercises: Leg Pull    To start, lie on your back with your knees bent and feet flat on the floor. Don t press your neck or lower back to the floor. Breathe deeply. You should feel comfortable and relaxed in this position.    Pull one knee to your chest.    Hold for 30 to 60 seconds. Return to starting position.    Repeat 2 times.    Switch legs.    For a double leg pull, pull both legs to your chest at the same time. Repeat 2 times.  For your safety, check with your healthcare provider before starting an exercise program.  Date Last  Reviewed: 3/1/2018    6856-0437 [employer]. [address] [place] [address]. All rights reserved. This information is not intended as a substitute for professional medical care. Always follow your healthcare professional's instructions.           Patient Education     Back Exercises: Lower Back Rotation    To start, lie on your back with your knees bent and feet flat on the floor. Don t press your neck or lower back to the floor. Breathe deeply. You should feel comfortable and relaxed in this position.    Drop both knees to one side. Turn your head to the other side. Keep your shoulders flat on the floor.    Do not push through pain.    Hold for 20 seconds.    Slowly switch sides.    Repeat 2 to 5 times.  Date Last Reviewed: 3/1/2018    0612-1144 [employer]. [address] [place] [address]. All rights reserved. This information is not intended as a substitute for professional medical care. Always follow your healthcare professional's instructions.           Patient Education     Back Exercises: Lower Back Stretch    To start, sit in a chair with your feet flat on the floor. Shift your weight slightly forward. Relax, and keep your ears, shoulders, and hips aligned while you do the following:    Sit with your feet well apart.    Bend forward and touch the floor with the backs of your hands. Relax and let your body drop.    Hold for 20 seconds. Return to starting position.    Repeat 2 times.   Date Last Reviewed: 11/1/2017 2000-2018 [employer]. [address] [place] [address]. All rights reserved. This information is not intended as a substitute for professional medical care. Always follow your healthcare professional's instructions.           Patient Education     Back Exercises: Pelvic Tilt    To start, lie on your back with your knees bent and feet flat on the floor. Don t press your neck or lower back to the floor. Breathe deeply. You  should feel comfortable and relaxed in this position:    Tighten your stomach and buttocks, and press your lower back toward the floor. This should be a small, subtle movement. This should not increase your pain.    Hold for 5 to 15 seconds. Release.    Repeat 2 to 5 times.  Date Last Reviewed: 3/1/2018    [id number] [employer]. [address] [place] [address]. All rights reserved. This information is not intended as a substitute for professional medical care. Always follow your healthcare professional's instructions.           Patient Education     Back Exercises: Seated Rotation    To start, sit in a chair with your feet flat on the floor. Shift your weight slightly forward to avoid rounding your back. Relax, and keep your ears, shoulders, and hips aligned:    Fold your arms and elbows just below shoulder height.    Turn from the waist with hips forward. Turn your head last. Do not push through the pain.    Hold for a count of 10 to 30. Return to starting position.    Repeat 3 to 5 times on one side. Then switch sides.  Date Last Reviewed: 3/1/2018    [id number] [employer]. [address] [place] [address]. All rights reserved. This information is not intended as a substitute for professional medical care. Always follow your healthcare professional's instructions.               Return in about 1 week (around 6/4/2019) for Recheck.    Risks, benefits, side effects and rationale for treatment plan fully discussed with the patient and understanding expressed.    HECTOR Brown CNP   [facility]

## 2019-05-28 NOTE — NURSING NOTE
"Chief Complaint   Patient presents with     Back Pain       Initial /78   Pulse 65   Temp 97.9  F (36.6  C)   Resp 18   Wt 78 kg (171 lb 14.4 oz)   SpO2 97%   Breastfeeding? Yes   BMI 29.05 kg/m   Estimated body mass index is 29.05 kg/m  as calculated from the following:    Height as of 5/10/19: 1.638 m (5' 4.5\").    Weight as of this encounter: 78 kg (171 lb 14.4 oz).    Patient presents to the clinic using No DME    Health Maintenance that is potentially due pending provider review:  ACT    Possibly completing today per provider review.    Is there anyone who you would like to be able to receive your results? not asked  If yes have patient fill out ARI    "

## 2019-05-29 ASSESSMENT — ASTHMA QUESTIONNAIRES: ACT_TOTALSCORE: 20

## 2019-08-21 ENCOUNTER — E-VISIT (OUTPATIENT)
Dept: FAMILY MEDICINE | Facility: CLINIC | Age: 29
End: 2019-08-21
Payer: COMMERCIAL

## 2019-08-21 DIAGNOSIS — Z53.9 ERRONEOUS ENCOUNTER--DISREGARD: Primary | ICD-10-CM

## 2019-08-21 PROCEDURE — 99207 ZZC NO BILLABLE SERVICE THIS VISIT: CPT | Performed by: NURSE PRACTITIONER

## 2019-10-18 ENCOUNTER — OFFICE VISIT (OUTPATIENT)
Dept: FAMILY MEDICINE | Facility: CLINIC | Age: 29
End: 2019-10-18
Payer: COMMERCIAL

## 2019-10-18 VITALS
BODY MASS INDEX: 26.33 KG/M2 | WEIGHT: 155.8 LBS | OXYGEN SATURATION: 97 % | SYSTOLIC BLOOD PRESSURE: 104 MMHG | RESPIRATION RATE: 18 BRPM | DIASTOLIC BLOOD PRESSURE: 60 MMHG | TEMPERATURE: 97.8 F | HEART RATE: 75 BPM

## 2019-10-18 DIAGNOSIS — Z30.019 ENCOUNTER FOR INITIAL PRESCRIPTION OF CONTRACEPTIVES, UNSPECIFIED CONTRACEPTIVE: Primary | ICD-10-CM

## 2019-10-18 LAB — HCG UR QL: NEGATIVE

## 2019-10-18 PROCEDURE — 81025 URINE PREGNANCY TEST: CPT | Performed by: NURSE PRACTITIONER

## 2019-10-18 PROCEDURE — 99213 OFFICE O/P EST LOW 20 MIN: CPT | Performed by: NURSE PRACTITIONER

## 2019-10-18 RX ORDER — ETHYNODIOL DIACETATE AND ETHINYL ESTRADIOL 1 MG-35MCG
1 KIT ORAL DAILY
Qty: 140 TABLET | Refills: 3 | Status: SHIPPED | OUTPATIENT
Start: 2019-10-18 | End: 2020-12-14

## 2019-10-18 ASSESSMENT — PAIN SCALES - GENERAL: PAINLEVEL: NO PAIN (0)

## 2019-10-18 NOTE — PATIENT INSTRUCTIONS
Pregnancy negative    Will start back on Kelnor    Give yourself a few months to adjust to birth control     See me back if not effective after this time

## 2019-10-18 NOTE — PROGRESS NOTES
SUBJECTIVE   Fatimah Roy is a  female who presents to clinic today for the following health issue(s):       Contraception             Problems with current birth control method: NA  Method interested in: oral contraceptives              Methods used previously: pill: Kelnor  Problems with previous methods: no    History of pregnancies:         Patient's last menstrual period was 2019.           Lab Results   Component Value Date    PAP NIL 05/10/2019     : 2  Para: 2  Menstrual cycle: regular  Heavy and lasts 6-10 days  cramps  Flow: with clots        Breakthrough bleeding occurring? 0 What week of her month? 0  History of migraines: no  Smoker: no  1st degree relative with History of: 0                 Regular self breast exam: yes    Accompanying Signs & Symptoms:   Dysuria: no  Vaginal discharge: no  Painful intercourse: no    Precipitating and/or Alleviating factors:    Currently sexually active: YES  Male, Female, both: Male  In stable relationship: YES  Desire STD testing: no  Are you planning a pregnancy soon: no                 Body mass index is 26.33 kg/m .  Consider Labs: UPT, STD: Chlamydia/GONORRHEA screen, Pap      PCP   Pedro Luis Leavitt [phone]    Health Maintenance        Health Maintenance Due   Topic Date Due     PNEUMOCOCCAL IMMUNIZATION 19-64 MEDIUM RISK (1 of 1 - PPSV23) 2008     PREVENTIVE CARE VISIT  2015     HPV  2017     ASTHMA ACTION PLAN  2018     INFLUENZA VACCINE (1) 2019       HPI        Patient Active Problem List   Diagnosis     CARDIOVASCULAR SCREENING; LDL GOAL LESS THAN 160     Intermittent asthma     Seasonal allergic rhinitis     Chronic allergic conjunctivitis     Current Outpatient Medications   Medication     cetirizine (ZYRTEC) 10 MG tablet     ethynodiol-ethinyl estradiol (KELNOR) 1-35 MG-MCG tablet     VENTOLIN  (90 Base) MCG/ACT inhaler     No current facility-administered medications for this visit.         Reviewed and updated as needed this visit by Provider:  Tobacco  Allergies  Meds  Med Hx  Surg Hx  Fam Hx  Soc Hx     ROS:  Constitutional, neuro, ENT, endocrine, pulmonary, cardiac, gastrointestinal, genitourinary, musculoskeletal, integument and psychiatric systems are negative, except as otherwise noted.    PHYSICAL EXAM   /60   Pulse 75   Temp 97.8  F (36.6  C)   Resp 18   Wt 70.7 kg (155 lb 12.8 oz)   LMP 09/29/2019   SpO2 97%   BMI 26.33 kg/m    Body mass index is 26.33 kg/m .  GENERAL APPEARANCE: healthy, alert and no distress  RESP: lungs clear to auscultation - no rales, rhonchi or wheezes  CV: regular rates and rhythm, normal S1 S2, no S3 or S4 and no murmur, click or rub  ABDOMEN: soft, nontender, without hepatosplenomegaly or masses and bowel sounds normal  MS: extremities normal- no gross deformities noted and peripheral pulses normal  SKIN: no suspicious lesions or rashes  NEURO: Normal strength and tone, mentation intact and speech normal  PSYCH: mentation appears normal and affect normal/bright      Diagnostic Test Results:  Results for orders placed or performed in visit on 10/18/19 (from the past 24 hour(s))   HCG Qual, Urine ([id number])   Result Value Ref Range    HCG Qual Urine Negative NEG^Negative       ASSESSMENT & PLAN     Assessment & Plan     1. Encounter for initial prescription of contraceptives, unspecified contraceptive  S/P C-secion 3/29/2019, no longer breast feeding and interested in restarting oral contraceptives due to heavy periods with cramping. Had been on Kenlor in the past and tolerated well. Denies hx of liver disease, migraine or thrombophlebitis   Denies fam hx of cancers or thrombophlebitis  Urine pregnancy negative. Okay to start oral contraceptives. Follow-up in 3 months or sooner if needed.   - HCG Qual, Urine ([id number])  - ethynodiol-ethinyl estradiol (KELNOR) 1-35 MG-MCG tablet; Take 1 tablet by mouth daily  Dispense: 140 tablet; Refill: 3    "  BMI:   Estimated body mass index is 26.33 kg/m  as calculated from the following:    Height as of 5/10/19: 1.638 m (5' 4.5\").    Weight as of this encounter: 70.7 kg (155 lb 12.8 oz).   Weight management plan: Discussed healthy diet and exercise guidelines        Patient Instructions   Pregnancy negative    Will start back on Kelnor    Give yourself a few months to adjust to birth control     See me back if not effective after this time      Return in about 3 months (around 1/18/2020), or if symptoms worsen or fail to improve.    Risks, benefits, side effects and rationale for treatment plan fully discussed with the patient and   understanding expressed.    HECTOR Chou [facility]                    "

## 2019-10-18 NOTE — NURSING NOTE
"Chief Complaint   Patient presents with     Contraception       Initial /60   Pulse 75   Temp 97.8  F (36.6  C)   Resp 18   Wt 70.7 kg (155 lb 12.8 oz)   LMP 09/29/2019   SpO2 97%   BMI 26.33 kg/m   Estimated body mass index is 26.33 kg/m  as calculated from the following:    Height as of 5/10/19: 1.638 m (5' 4.5\").    Weight as of this encounter: 70.7 kg (155 lb 12.8 oz).    Patient presents to the clinic using No DME    Health Maintenance that is potentially due pending provider review:  NONE    n/a    Is there anyone who you would like to be able to receive your results? not asked  If yes have patient fill out ARI    "

## 2019-11-06 ENCOUNTER — HEALTH MAINTENANCE LETTER (OUTPATIENT)
Age: 29
End: 2019-11-06

## 2020-06-12 NOTE — LETTER
Advised pt of slightly high potassium-5.2 and Dr. Osvaldo Harris response. Advised pt of some high potassium foods to avoid. Advised pt to call with any questions or concerns prior to 6/24/20 appt with Dr. Amador Quinn. Pt verbalized understanding. December 11, 2017      Fatimah Roy  [address] [place] [phone]        To Whom It May Concern:        Fatimah Roy was seen in our clinic. She may return to work on 12/13/2017 if she feels better.             Sincerely,            Pedro Luis Leavitt MD

## 2020-08-21 NOTE — TELEPHONE ENCOUNTER
Hand signed rx walked to [facility].    -Alaina Matias  Clinic Station      Rituxan Counseling:  I discussed with the patient the risks of Rituxan infusions. Side effects can include infusion reactions, severe drug rashes including mucocutaneous reactions, reactivation of latent hepatitis and other infections and rarely progressive multifocal leukoencephalopathy.  All of the patient's questions and concerns were addressed.

## 2020-09-21 ENCOUNTER — ALLIED HEALTH/NURSE VISIT (OUTPATIENT)
Dept: FAMILY MEDICINE | Facility: CLINIC | Age: 30
End: 2020-09-21
Payer: COMMERCIAL

## 2020-09-21 DIAGNOSIS — Z23 NEED FOR PROPHYLACTIC VACCINATION AND INOCULATION AGAINST INFLUENZA: Primary | ICD-10-CM

## 2020-09-21 PROCEDURE — 90686 IIV4 VACC NO PRSV 0.5 ML IM: CPT

## 2020-09-21 PROCEDURE — 90471 IMMUNIZATION ADMIN: CPT

## 2020-09-21 PROCEDURE — 99207 ZZC NO CHARGE NURSE ONLY: CPT

## 2020-09-28 DIAGNOSIS — J45.40 MODERATE PERSISTENT ASTHMA WITHOUT COMPLICATION: ICD-10-CM

## 2020-09-28 NOTE — TELEPHONE ENCOUNTER
"Requested Prescriptions   Pending Prescriptions Disp Refills     VENTOLIN  (90 Base) MCG/ACT inhaler [Pharmacy Med Name: Ventolin HFA 90 mcg/Actuation Aerosol Inhaler] 18 g 3     Sig: Inhale 2 puffs into the lungs every 6 hours as needed for shortness of breath / dyspnea or wheezing       Asthma Maintenance Inhalers - Anticholinergics Failed - 9/28/2020  9:15 AM        Failed - Asthma control assessment score within normal limits in last 6 months     Please review ACT score.           Failed - Recent (6 mo) or future (30 days) visit within the authorizing provider's specialty     Patient had office visit in the last 6 months or has a visit in the next 30 days with authorizing provider or within the authorizing provider's specialty.  See \"Patient Info\" tab in inbasket, or \"Choose Columns\" in Meds & Orders section of the refill encounter.            Passed - Patient is age 12 years or older        Passed - Medication is active on med list       Short-Acting Beta Agonist Inhalers Protocol  Failed - 9/28/2020  9:15 AM        Failed - Asthma control assessment score within normal limits in last 6 months     Please review ACT score.           Failed - Recent (6 mo) or future (30 days) visit within the authorizing provider's specialty     Patient had office visit in the last 6 months or has a visit in the next 30 days with authorizing provider or within the authorizing provider's specialty.  See \"Patient Info\" tab in inbasket, or \"Choose Columns\" in Meds & Orders section of the refill encounter.            Passed - Patient is age 12 or older        Passed - Medication is active on med list             "

## 2020-09-29 RX ORDER — ALBUTEROL SULFATE 90 UG/1
AEROSOL, METERED RESPIRATORY (INHALATION)
Qty: 18 G | Refills: 0 | Status: SHIPPED | OUTPATIENT
Start: 2020-09-29 | End: 2020-12-15

## 2020-11-29 ENCOUNTER — HEALTH MAINTENANCE LETTER (OUTPATIENT)
Age: 30
End: 2020-11-29

## 2020-12-09 ENCOUNTER — TELEPHONE (OUTPATIENT)
Dept: FAMILY MEDICINE | Facility: CLINIC | Age: 30
End: 2020-12-09

## 2020-12-09 ASSESSMENT — ENCOUNTER SYMPTOMS
NAUSEA: 0
BREAST MASS: 0
HEARTBURN: 0
PARESTHESIAS: 0
SHORTNESS OF BREATH: 0
PALPITATIONS: 0
HEMATOCHEZIA: 0
CONSTIPATION: 0
HEADACHES: 1
SORE THROAT: 0
NERVOUS/ANXIOUS: 1
FREQUENCY: 0
FEVER: 0
DIARRHEA: 0
WEAKNESS: 0
ARTHRALGIAS: 0
DYSURIA: 0
CHILLS: 0
EYE PAIN: 0
ABDOMINAL PAIN: 0
COUGH: 0
MYALGIAS: 0
DIZZINESS: 0
JOINT SWELLING: 0
HEMATURIA: 0

## 2020-12-09 ASSESSMENT — PATIENT HEALTH QUESTIONNAIRE - PHQ9
10. IF YOU CHECKED OFF ANY PROBLEMS, HOW DIFFICULT HAVE THESE PROBLEMS MADE IT FOR YOU TO DO YOUR WORK, TAKE CARE OF THINGS AT HOME, OR GET ALONG WITH OTHER PEOPLE: VERY DIFFICULT
SUM OF ALL RESPONSES TO PHQ QUESTIONS 1-9: 11
SUM OF ALL RESPONSES TO PHQ QUESTIONS 1-9: 11

## 2020-12-09 NOTE — TELEPHONE ENCOUNTER
left message for patient to return call.  Patient answered several days would be better of dead on PHQ 9 today  I want to talk with her.    I talked with Ester and she does not have a plan to hurt self and feels safe.  She has upcoming appointment  Rosibel Puckett RN

## 2020-12-10 ASSESSMENT — PATIENT HEALTH QUESTIONNAIRE - PHQ9: SUM OF ALL RESPONSES TO PHQ QUESTIONS 1-9: 11

## 2020-12-14 DIAGNOSIS — Z30.019 ENCOUNTER FOR INITIAL PRESCRIPTION OF CONTRACEPTIVES, UNSPECIFIED CONTRACEPTIVE: ICD-10-CM

## 2020-12-14 RX ORDER — ETHYNODIOL DIACETATE AND ETHINYL ESTRADIOL 1 MG-35MCG
KIT ORAL
Qty: 140 TABLET | Refills: 3 | Status: SHIPPED | OUTPATIENT
Start: 2020-12-14 | End: 2022-02-01

## 2020-12-15 ENCOUNTER — OFFICE VISIT (OUTPATIENT)
Dept: FAMILY MEDICINE | Facility: CLINIC | Age: 30
End: 2020-12-15
Payer: COMMERCIAL

## 2020-12-15 VITALS
DIASTOLIC BLOOD PRESSURE: 62 MMHG | TEMPERATURE: 97.3 F | WEIGHT: 140 LBS | BODY MASS INDEX: 23.32 KG/M2 | HEART RATE: 95 BPM | SYSTOLIC BLOOD PRESSURE: 118 MMHG | OXYGEN SATURATION: 99 % | HEIGHT: 65 IN

## 2020-12-15 DIAGNOSIS — R45.86 MOOD CHANGES: ICD-10-CM

## 2020-12-15 DIAGNOSIS — Z00.00 ROUTINE GENERAL MEDICAL EXAMINATION AT A HEALTH CARE FACILITY: Primary | ICD-10-CM

## 2020-12-15 DIAGNOSIS — J45.40 MODERATE PERSISTENT ASTHMA WITHOUT COMPLICATION: ICD-10-CM

## 2020-12-15 PROCEDURE — 99213 OFFICE O/P EST LOW 20 MIN: CPT | Mod: 25 | Performed by: NURSE PRACTITIONER

## 2020-12-15 PROCEDURE — 99395 PREV VISIT EST AGE 18-39: CPT | Performed by: NURSE PRACTITIONER

## 2020-12-15 RX ORDER — FLUTICASONE PROPIONATE 220 UG/1
1 AEROSOL, METERED RESPIRATORY (INHALATION) 2 TIMES DAILY
Qty: 12 G | Refills: 2 | Status: SHIPPED | OUTPATIENT
Start: 2020-12-15 | End: 2021-08-12

## 2020-12-15 RX ORDER — ALBUTEROL SULFATE 90 UG/1
AEROSOL, METERED RESPIRATORY (INHALATION)
Qty: 18 G | Refills: 5 | Status: SHIPPED | OUTPATIENT
Start: 2020-12-15 | End: 2022-06-23

## 2020-12-15 ASSESSMENT — ASTHMA QUESTIONNAIRES
QUESTION_1 LAST FOUR WEEKS HOW MUCH OF THE TIME DID YOUR ASTHMA KEEP YOU FROM GETTING AS MUCH DONE AT WORK, SCHOOL OR AT HOME: SOME OF THE TIME
QUESTION_3 LAST FOUR WEEKS HOW OFTEN DID YOUR ASTHMA SYMPTOMS (WHEEZING, COUGHING, SHORTNESS OF BREATH, CHEST TIGHTNESS OR PAIN) WAKE YOU UP AT NIGHT OR EARLIER THAN USUAL IN THE MORNING: TWO OR THREE NIGHTS A WEEK
QUESTION_4 LAST FOUR WEEKS HOW OFTEN HAVE YOU USED YOUR RESCUE INHALER OR NEBULIZER MEDICATION (SUCH AS ALBUTEROL): TWO OR THREE TIMES PER WEEK
QUESTION_2 LAST FOUR WEEKS HOW OFTEN HAVE YOU HAD SHORTNESS OF BREATH: THREE TO SIX TIMES A WEEK
QUESTION_5 LAST FOUR WEEKS HOW WOULD YOU RATE YOUR ASTHMA CONTROL: POORLY CONTROLLED
ACT_TOTALSCORE: 13

## 2020-12-15 ASSESSMENT — ENCOUNTER SYMPTOMS
DIARRHEA: 0
BREAST MASS: 0
SORE THROAT: 0
FEVER: 0
HEADACHES: 1
HEMATOCHEZIA: 0
FREQUENCY: 0
JOINT SWELLING: 0
HEMATURIA: 0
ABDOMINAL PAIN: 0
DIZZINESS: 0
PARESTHESIAS: 0
CHILLS: 0
EYE PAIN: 0
NERVOUS/ANXIOUS: 1
PALPITATIONS: 0
ARTHRALGIAS: 0
MYALGIAS: 0
WEAKNESS: 0
HEARTBURN: 0
NAUSEA: 0
COUGH: 0
DYSURIA: 0
SHORTNESS OF BREATH: 0
CONSTIPATION: 0

## 2020-12-15 ASSESSMENT — PAIN SCALES - GENERAL: PAINLEVEL: NO PAIN (0)

## 2020-12-15 ASSESSMENT — MIFFLIN-ST. JEOR: SCORE: 1342.98

## 2020-12-15 NOTE — PROGRESS NOTES
SUBJECTIVE:   CC: Fatimah Roy is an 30 year old woman who presents for preventive health visit.       Patient has been advised of split billing requirements and indicates understanding: Yes  Healthy Habits:     Getting at least 3 servings of Calcium per day:  Yes    Bi-annual eye exam:  Yes    Dental care twice a year:  NO    Sleep apnea or symptoms of sleep apnea:  None    Diet:  Regular (no restrictions) and Breakfast skipped    Frequency of exercise:  None    Taking medications regularly:  Yes    Medication side effects:  None    PHQ-2 Total Score: 4    Additional concerns today:  Yes    2 children ages 1.  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  Significant change.  Doing distance learning in the middle of a pandemic.  Has had more problems with anxiety and some depression.  Having difficulty pulling herself out of that.    She has a history of asthma.  Recently needing to use her albuterol more.  Seems to be tied to increased anxiety.  No recent Covid.  She has had a history of previous exposure with quarantine x14 days        -------------------------------------    Today's PHQ-2 Score:   PHQ-2 ( 1999 Pfizer) 12/9/2020   Q1: Little interest or pleasure in doing things 2   Q2: Feeling down, depressed or hopeless 2   PHQ-2 Score 4   Q1: Little interest or pleasure in doing things More than half the days   Q2: Feeling down, depressed or hopeless More than half the days   PHQ-2 Score 4       Abuse: Current or Past (Physical, Sexual or Emotional) - No  Do you feel safe in your environment? Yes        Social History     Tobacco Use     Smoking status: Never Smoker     Smokeless tobacco: Never Used   Substance Use Topics     Alcohol use: Yes     Alcohol/week: 0.0 standard drinks     Comment: rare- quit with pregnancy     If you drink alcohol do you  typically have >3 drinks per day or >7 drinks per week? No    No flowsheet data found.    Reviewed orders with patient.  Reviewed health maintenance and updated orders accordingly - Yes  BP Readings from Last 3 Encounters:   12/15/20 118/62   10/18/19 104/60   19 100/78    Wt Readings from Last 3 Encounters:   12/15/20 63.5 kg (140 lb)   10/18/19 70.7 kg (155 lb 12.8 oz)   19 78 kg (171 lb 14.4 oz)                    Mammogram not appropriate for this patient based on age.    Pertinent mammograms are reviewed under the imaging tab.  History of abnormal Pap smear:   Last 3 Pap and HPV Results:   PAP / HPV 5/10/2019 2016 2014   PAP NIL NIL NIL     PAP / HPV 5/10/2019 2016 2014   PAP NIL NIL NIL     Reviewed and updated as needed this visit by clinical staff  Tobacco  Allergies  Meds              Reviewed and updated as needed this visit by Provider                Past Medical History:   Diagnosis Date     Chickenpox      Uncomplicated asthma       Past Surgical History:   Procedure Laterality Date      SECTION N/A 3/24/2017    Procedure:  SECTION;  Surgeon: Renetta Saeed MD;  Location: [facility]      SECTION N/A 3/29/2019    Procedure:  SECTION, Repeat;  Surgeon: Renetta Saeed MD;  Location: [facility]     ESOPHAGOSCOPY, GASTROSCOPY, DUODENOSCOPY (EGD), COMBINED  2012    Procedure: COMBINED ESOPHAGOSCOPY, GASTROSCOPY, DUODENOSCOPY (EGD);  Gastroscopy;  Surgeon: Pete Arciniega MD;  Location: [facility]     MOUTH SURGERY      wisdom teeth       Review of Systems   Constitutional: Negative for chills and fever.   HENT: Negative for congestion, ear pain, hearing loss and sore throat.    Eyes: Negative for pain and visual disturbance.   Respiratory: Negative for cough and shortness of breath.    Cardiovascular: Negative for chest pain, palpitations and peripheral edema.   Gastrointestinal: Negative for abdominal pain, constipation, diarrhea,  "heartburn, hematochezia and nausea.   Breasts:  Negative for tenderness, breast mass and discharge.   Genitourinary: Negative for dysuria, frequency, genital sores, hematuria, pelvic pain, urgency, vaginal bleeding and vaginal discharge.   Musculoskeletal: Negative for arthralgias, joint swelling and myalgias.   Skin: Negative for rash.   Neurological: Positive for headaches. Negative for dizziness, weakness and paresthesias.   Psychiatric/Behavioral: Positive for mood changes. The patient is nervous/anxious.           OBJECTIVE:   /62   Pulse 95   Temp 97.3  F (36.3  C) (Tympanic)   Ht 1.638 m (5' 4.5\")   Wt 63.5 kg (140 lb)   LMP 10/14/2020   SpO2 99%   BMI 23.66 kg/m    Physical Exam  GENERAL: healthy, alert and no distress  EYES: Eyes grossly normal to inspection, PERRL and conjunctivae and sclerae normal  HENT: ear canals and TM's normal, nose and mouth without ulcers or lesions  NECK: no adenopathy, no asymmetry, masses, or scars and thyroid normal to palpation  RESP: lungs clear to auscultation - no rales, rhonchi or wheezes  BREAST: normal without masses, tenderness or nipple discharge and no palpable axillary masses or adenopathy  CV: regular rate and rhythm, normal S1 S2, no S3 or S4, no murmur, click or rub, no peripheral edema and peripheral pulses strong  ABDOMEN: soft, nontender, no hepatosplenomegaly, no masses and bowel sounds normal  MS: no gross musculoskeletal defects noted, no edema  SKIN: no suspicious lesions or rashes  NEURO: Normal strength and tone, mentation intact and speech normal  PSYCH: mentation appears normal, tearful, judgement and insight intact and appearance well groomed    Diagnostic Test Results:  Labs reviewed in Epic    ASSESSMENT/PLAN:   Fatimah was seen today for physical.    Diagnoses and all orders for this visit:    Routine general medical examination at a health care facility    Moderate persistent asthma without complication  -     albuterol (VENTOLIN HFA) " "108 (90 Base) MCG/ACT inhaler; Inhale 2 puffs into the lungs every 6 hours as needed for shortness of breath / dyspnea or wheezing  -     fluticasone (FLOVENT HFA) 220 MCG/ACT inhaler; Inhale 1 puff into the lungs 2 times daily    Mood changes  -     MENTAL HEALTH REFERRAL  - Adult; Assessments and Testing; General Psychological Testing; Other: [facility] [phone]; We will contact you to schedule the appointment or please call with any questions      [facility] [place] recommended. For therapy amd formal MH   Add in flovetnt  Continue Albuterol.      Patient has been advised of split billing requirements and indicates understanding: Yes  COUNSELING:  Reviewed preventive health counseling, as reflected in patient instructions       Aspirin prophylaxis       Contraception    Estimated body mass index is 23.66 kg/m  as calculated from the following:    Height as of this encounter: 1.638 m (5' 4.5\").    Weight as of this encounter: 63.5 kg (140 lb).        She reports that she has never smoked. She has never used smokeless tobacco.      Counseling Resources:  ATP IV Guidelines  Pooled Cohorts Equation Calculator  Breast Cancer Risk Calculator  BRCA-Related Cancer Risk Assessment: FHS-7 Tool  FRAX Risk Assessment  ICSI Preventive Guidelines  Dietary Guidelines for Americans, 2010  [email or web address]'s MyPlate  ASA Prophylaxis  Lung CA Screening    HECTOR [facility]  Answers for HPI/ROS submitted by the patient on 12/9/2020   Annual Exam:  If you checked off any problems, how difficult have these problems made it for you to do your work, take care of things at home, or get along with other people?: Very difficult  PHQ9 TOTAL SCORE: 11    "

## 2020-12-16 ASSESSMENT — ASTHMA QUESTIONNAIRES: ACT_TOTALSCORE: 13

## 2020-12-22 ENCOUNTER — MYC MEDICAL ADVICE (OUTPATIENT)
Dept: FAMILY MEDICINE | Facility: CLINIC | Age: 30
End: 2020-12-22

## 2021-03-12 ENCOUNTER — OFFICE VISIT (OUTPATIENT)
Dept: FAMILY MEDICINE | Facility: CLINIC | Age: 31
End: 2021-03-12
Payer: COMMERCIAL

## 2021-03-12 VITALS
DIASTOLIC BLOOD PRESSURE: 70 MMHG | WEIGHT: 137 LBS | SYSTOLIC BLOOD PRESSURE: 100 MMHG | HEART RATE: 80 BPM | BODY MASS INDEX: 23.15 KG/M2 | RESPIRATION RATE: 12 BRPM | TEMPERATURE: 98.5 F

## 2021-03-12 DIAGNOSIS — F41.1 GAD (GENERALIZED ANXIETY DISORDER): ICD-10-CM

## 2021-03-12 DIAGNOSIS — F33.0 MILD EPISODE OF RECURRENT MAJOR DEPRESSIVE DISORDER (H): ICD-10-CM

## 2021-03-12 PROCEDURE — 99213 OFFICE O/P EST LOW 20 MIN: CPT | Performed by: NURSE PRACTITIONER

## 2021-03-12 RX ORDER — SERTRALINE HYDROCHLORIDE 25 MG/1
TABLET, FILM COATED ORAL
Qty: 55 TABLET | Refills: 0 | Status: SHIPPED | OUTPATIENT
Start: 2021-03-12 | End: 2021-04-13

## 2021-03-12 RX ORDER — HYDROXYZINE HYDROCHLORIDE 25 MG/1
25-50 TABLET, FILM COATED ORAL 3 TIMES DAILY PRN
Qty: 30 TABLET | Refills: 0 | Status: SHIPPED | OUTPATIENT
Start: 2021-03-12 | End: 2022-08-23

## 2021-03-12 ASSESSMENT — ANXIETY QUESTIONNAIRES
GAD7 TOTAL SCORE: 18
7. FEELING AFRAID AS IF SOMETHING AWFUL MIGHT HAPPEN: NEARLY EVERY DAY
5. BEING SO RESTLESS THAT IT IS HARD TO SIT STILL: SEVERAL DAYS
2. NOT BEING ABLE TO STOP OR CONTROL WORRYING: NEARLY EVERY DAY
4. TROUBLE RELAXING: NEARLY EVERY DAY
GAD7 TOTAL SCORE: 18
1. FEELING NERVOUS, ANXIOUS, OR ON EDGE: NEARLY EVERY DAY
6. BECOMING EASILY ANNOYED OR IRRITABLE: MORE THAN HALF THE DAYS
GAD7 TOTAL SCORE: 18
3. WORRYING TOO MUCH ABOUT DIFFERENT THINGS: NEARLY EVERY DAY
7. FEELING AFRAID AS IF SOMETHING AWFUL MIGHT HAPPEN: NEARLY EVERY DAY

## 2021-03-12 ASSESSMENT — PATIENT HEALTH QUESTIONNAIRE - PHQ9
SUM OF ALL RESPONSES TO PHQ QUESTIONS 1-9: 15
10. IF YOU CHECKED OFF ANY PROBLEMS, HOW DIFFICULT HAVE THESE PROBLEMS MADE IT FOR YOU TO DO YOUR WORK, TAKE CARE OF THINGS AT HOME, OR GET ALONG WITH OTHER PEOPLE: VERY DIFFICULT
SUM OF ALL RESPONSES TO PHQ QUESTIONS 1-9: 15

## 2021-03-12 NOTE — PATIENT INSTRUCTIONS
Mild episode of recurrent major depressive disorder (H)  Chronic, stable. More anxiety. Will start on Zoloft - tapering up. Continue to work on good self cares. Follow-up in 3-4 weeks virtually for recheck.   - sertraline (ZOLOFT) 25 MG tablet; Take 1 tablet (25 mg) by mouth daily for 7 days, THEN 2 tablets (50 mg) daily for 24 days.    PATRIC (generalized anxiety disorder)  Chronic, worsened. Saw therapist, was some help. Increasing and affecting multiple areas of life. Doing good self cares. Discussed diet, exercise and sleep. Waking in the night anxious. Discussed and will start Zoloft. Trial Hydroxyzine as needed for anxiety - take at bedtime to help sleep along with good bedtime hygiene as below. Recheck in 3-4 weeks.   - sertraline (ZOLOFT) 25 MG tablet; Take 1 tablet (25 mg) by mouth daily for 7 days, THEN 2 tablets (50 mg) daily for 24 days.  - hydrOXYzine (ATARAX) 25 MG tablet; Take 1-2 tablets (25-50 mg) by mouth 3 times daily as needed for anxiety (or at bedtime)    Would like you to work on bedtime hygiene    - Shutting screens off 1 hour or 1/2 hour prior to bed  - journal 3 positive things from your day  - meditation or yoga  - deep breathing exercises   - reading a light book   - sleepy time tea  - sleep only as much as you need to feel rested and then get out of bed  - keep a regular sleep schedule  - avoid forcing sleep  - avoid caffeinated beverages after lunch  - avoid alcohol near bedtime  - avoid smoking, especially in the evening  - do not go to bed hungry  - adjust bedroom environment

## 2021-03-12 NOTE — PROGRESS NOTES
Assessment & Plan     Mild episode of recurrent major depressive disorder (H)  Chronic, stable. More anxiety. Will start on Zoloft - tapering up. Continue to work on good self cares. Follow-up in 3-4 weeks virtually for recheck.   - sertraline (ZOLOFT) 25 MG tablet; Take 1 tablet (25 mg) by mouth daily for 7 days, THEN 2 tablets (50 mg) daily for 24 days.    PATRIC (generalized anxiety disorder)  Chronic, worsened. Saw therapist, was some help. Increasing and affecting multiple areas of life. Doing good self cares. Discussed diet, exercise and sleep. Waking in the night anxious. Discussed and will start Zoloft. Trial Hydroxyzine as needed for anxiety - take at bedtime to help sleep along with good bedtime hygiene as below. Recheck in 3-4 weeks.   - sertraline (ZOLOFT) 25 MG tablet; Take 1 tablet (25 mg) by mouth daily for 7 days, THEN 2 tablets (50 mg) daily for 24 days.  - hydrOXYzine (ATARAX) 25 MG tablet; Take 1-2 tablets (25-50 mg) by mouth 3 times daily as needed for anxiety (or at bedtime)             Depression Screening Follow Up    PHQ 3/12/2021   PHQ-9 Total Score 15   Q9: Thoughts of better off dead/self-harm past 2 weeks Not at all   F/U: Thoughts of suicide or self-harm -   F/U: Safety concerns -          See Patient Instructions    Return in about 1 month (around 4/12/2021) for Recheck.    HECTOR [facility]    Oliver Norman is a 30 year old who presents for the following health issues;    HPI       Anxiety Follow-Up    How are you doing with your anxiety since your last visit? Worsened     Are you having other symptoms that might be associated with anxiety? Yes:  pit in stomach, sinking feeling all the time, feeling something bad is going to happen, can't stay asleep, not eating well    Have you had a significant life event? OTHER: work has been difficult - Covid     Are you feeling depressed? Yes:  depends on the time - was depressed in December -  feels depression is getting better    Do you have any concerns with your use of alcohol or other drugs? No     Did see a Therapist - now doing weekly Therapist with her son and can't go for herself    Therapist didn't help too much    No issues falling asleep, has difficulty waking between 1-3 am with racing thoughts     Felt like back in December was a little of Depression and anxiety. Now more so the anxiety    Social History     Tobacco Use     Smoking status: Never Smoker     Smokeless tobacco: Never Used   Substance Use Topics     Alcohol use: Yes     Alcohol/week: 0.0 standard drinks     Comment: rare- quit with pregnancy     Drug use: No     PATRIC-7 SCORE 5/10/2019 3/12/2021   Total Score - 18 (severe anxiety)   Total Score 0 18     PHQ 5/10/2019 12/9/2020 3/12/2021   PHQ-9 Total Score 5 11 15   Q9: Thoughts of better off dead/self-harm past 2 weeks Not at all Several days Not at all   F/U: Thoughts of suicide or self-harm - No -   F/U: Safety concerns - No -           Review of Systems   Constitutional, HEENT, cardiovascular, pulmonary, gi and gu systems are negative, except as otherwise noted.      Objective    /70   Pulse 80   Temp 98.5  F (36.9  C) (Tympanic)   Resp 12   Wt 62.1 kg (137 lb)   LMP 01/05/2021   BMI 23.15 kg/m    Body mass index is 23.15 kg/m .  Physical Exam   GENERAL APPEARANCE: healthy, alert and no distress  RESP: lungs clear to auscultation - no rales, rhonchi or wheezes  CV: regular rates and rhythm, normal S1 S2, no S3 or S4 and no murmur, click or rub  ABDOMEN: soft, nontender, without hepatosplenomegaly or masses and bowel sounds normal  MS: extremities normal- no gross deformities noted  SKIN: no suspicious lesions or rashes  NEURO: Normal strength and tone, mentation intact and speech normal  PSYCH: mentation appears normal and affect normal/bright    Diagnostic Test Results:  none

## 2021-03-12 NOTE — NURSING NOTE
"Chief Complaint   Patient presents with     Anxiety     /70   Pulse 80   Temp 98.5  F (36.9  C) (Tympanic)   Resp 12   Wt 62.1 kg (137 lb)   LMP 01/05/2021   BMI 23.15 kg/m   Estimated body mass index is 23.15 kg/m  as calculated from the following:    Height as of 12/15/20: 1.638 m (5' 4.5\").    Weight as of this encounter: 62.1 kg (137 lb).  Patient presents to the clinic using No DME      Health Maintenance that is potentially due pending provider review:    Health Maintenance Due   Topic Date Due     Pneumococcal Vaccine: Pediatrics (0 to 5 Years) and At-Risk Patients (6 to 64 Years) (1 of 2 - PPSV23) Never done     HEPATITIS C SCREENING  Never done     ASTHMA ACTION PLAN  08/09/2018        n/a        "

## 2021-03-13 ASSESSMENT — ANXIETY QUESTIONNAIRES: GAD7 TOTAL SCORE: 18

## 2021-04-13 ENCOUNTER — VIRTUAL VISIT (OUTPATIENT)
Dept: FAMILY MEDICINE | Facility: CLINIC | Age: 31
End: 2021-04-13
Payer: COMMERCIAL

## 2021-04-13 DIAGNOSIS — F33.0 MILD EPISODE OF RECURRENT MAJOR DEPRESSIVE DISORDER (H): ICD-10-CM

## 2021-04-13 DIAGNOSIS — F41.1 GAD (GENERALIZED ANXIETY DISORDER): ICD-10-CM

## 2021-04-13 PROCEDURE — 99213 OFFICE O/P EST LOW 20 MIN: CPT | Mod: 95 | Performed by: NURSE PRACTITIONER

## 2021-04-13 NOTE — PROGRESS NOTES
Ester is a 30 year old who is being evaluated via a billable video visit.      How would you like to obtain your AVS? MyChart  If the video visit is dropped, the invitation should be resent by: Text to cell phone: [phone]  Will anyone else be joining your video visit? No      Video Start Time: 3:42 PM    Assessment & Plan     Mild episode of recurrent major depressive disorder (H)  Chronic, improving.  Started on sertraline gradually 1 month ago, seeing improvement.  Will keep at current dose and patient to follow-up as needed for any dosage changes or needs.  - sertraline (ZOLOFT) 50 MG tablet; Take 1 tablet (50 mg) by mouth daily    PATRIC (generalized anxiety disorder)  Chronic, improving.  Started on sertraline as above.  Feels continues to see improvement, would like to continue on same dose.  We will continue 50 mg of sertraline and follow-up as above.  - sertraline (ZOLOFT) 50 MG tablet; Take 1 tablet (50 mg) by mouth daily             See Patient Instructions    No follow-ups on file.    HECTOR [facility]    Subjective   Ester is a 30 year old who presents for the following health issues     HPI     Depression and Anxiety Follow-Up    How are you doing with your depression since your last visit? Improved     How are you doing with your anxiety since your last visit?  Improved     Are you having other symptoms that might be associated with depression or anxiety? No feels like can process stress better than last visit    Have you had a significant life event? No     Do you have any concerns with your use of alcohol or other drugs? No    Social History     Tobacco Use     Smoking status: Never Smoker     Smokeless tobacco: Never Used   Substance Use Topics     Alcohol use: Yes     Alcohol/week: 0.0 standard drinks     Comment: rare- quit with pregnancy     Drug use: No     PHQ 12/9/2020 3/12/2021 4/13/2021   PHQ-9 Total Score 11 15 6   Q9: Thoughts of better off  dead/self-harm past 2 weeks Several days Not at all Not at all   F/U: Thoughts of suicide or self-harm No - -   F/U: Safety concerns No - -     PATRIC-7 SCORE 5/10/2019 3/12/2021 4/13/2021   Total Score - 18 (severe anxiety) 7 (mild anxiety)   Total Score 0 18 7     Last PHQ-9 4/13/2021   1.  Little interest or pleasure in doing things 0   2.  Feeling down, depressed, or hopeless 1   3.  Trouble falling or staying asleep, or sleeping too much 1   4.  Feeling tired or having little energy 1   5.  Poor appetite or overeating 1   6.  Feeling bad about yourself 1   7.  Trouble concentrating 1   8.  Moving slowly or restless 0   Q9: Thoughts of better off dead/self-harm past 2 weeks 0   PHQ-9 Total Score 6   Difficulty at work, home, or with people -   In the past two weeks have you had thoughts of suicide or self harm? -   Do you have concerns about your personal safety or the safety of others? -     PATRIC-7  4/13/2021   1. Feeling nervous, anxious, or on edge 1   2. Not being able to stop or control worrying 2   3. Worrying too much about different things 1   4. Trouble relaxing 1   5. Being so restless that it is hard to sit still 0   6. Becoming easily annoyed or irritable 1   7. Feeling afraid, as if something awful might happen 1   PATRIC-7 Total Score 7       Suicide Assessment Five-step Evaluation and Treatment (SAFE-T)      How many servings of fruits and vegetables do you eat daily?  2-3    On average, how many sweetened beverages do you drink each day (Examples: soda, juice, sweet tea, etc.  Do NOT count diet or artificially sweetened beverages)?   0 sugar and cream in coffee    How many days per week do you exercise enough to make your heart beat faster? 3 or less    How many minutes a day do you exercise enough to make your heart beat faster? 9 or less    How many days per week do you miss taking your medication? 0      Review of Systems   Constitutional, HEENT, cardiovascular, pulmonary, gi and gu systems are  negative, except as otherwise noted.      Objective           Vitals:  No vitals were obtained today due to virtual visit.    Physical Exam   GENERAL: Healthy, alert and no distress  EYES: Eyes grossly normal to inspection.  No discharge or erythema, or obvious scleral/conjunctival abnormalities.  RESP: No audible wheeze, cough, or visible cyanosis.  No visible retractions or increased work of breathing.    SKIN: Visible skin clear. No significant rash, abnormal pigmentation or lesions.  NEURO: Cranial nerves grossly intact.  Mentation and speech appropriate for age.  PSYCH: Mentation appears normal, affect normal/bright, judgement and insight intact, normal speech and appearance well-groomed.    Diagnostic Test Results:  none            Video-Visit Details    Type of service:  Video Visit    Video End Time:3:47 PM    Originating Location (pt. Location): Home    Distant Location (provider location):  [facility]     Platform used for Video Visit: [employer] (by [email or web address])

## 2021-04-13 NOTE — PATIENT INSTRUCTIONS
Mild episode of recurrent major depressive disorder (H)  Chronic, improving.  Started on sertraline gradually 1 month ago, seeing improvement.  Will keep at current dose and patient to follow-up as needed for any dosage changes or needs.  - sertraline (ZOLOFT) 50 MG tablet; Take 1 tablet (50 mg) by mouth daily    PATRIC (generalized anxiety disorder)  Chronic, improving.  Started on sertraline as above.  Feels continues to see improvement, would like to continue on same dose.  We will continue 50 mg of sertraline and follow-up as above.  - sertraline (ZOLOFT) 50 MG tablet; Take 1 tablet (50 mg) by mouth daily

## 2021-07-21 ENCOUNTER — TELEPHONE (OUTPATIENT)
Dept: FAMILY MEDICINE | Facility: CLINIC | Age: 31
End: 2021-07-21

## 2021-07-21 ENCOUNTER — MYC MEDICAL ADVICE (OUTPATIENT)
Dept: FAMILY MEDICINE | Facility: CLINIC | Age: 31
End: 2021-07-21

## 2021-07-21 NOTE — TELEPHONE ENCOUNTER
Patient Quality Outreach      Summary:    Patient has the following on her problem list/HM:   Depression / Dysthymia review    6 Month Remission: 4-8 month window range: unknown  12 Month Remission: 10-14 month window range: unknown       PHQ-9 SCORE 12/9/2020 3/12/2021 4/13/2021   PHQ-9 Total Score MyChart 11 (Moderate depression) 15 (Moderately severe depression) 6 (Mild depression)   PHQ-9 Total Score 11 15 6       If PHQ-9 recheck is 5 or more, route to provider for next steps.    Asthma review       ACT Total Scores 12/15/2020   ACT TOTAL SCORE -   ASTHMA ER VISITS -   ASTHMA HOSPITALIZATIONS -   ACT TOTAL SCORE (Goal Greater than or Equal to 20) 13   In the past 12 months, how many times did you visit the emergency room for your asthma without being admitted to the hospital? 0   In the past 12 months, how many times were you hospitalized overnight because of your asthma? 0          Patient is due/failing the following:   ACT needed    Type of outreach:    Sent [employer] message.    Questions for provider review:    None                                                                                                                                     Fariba GONZALEZ CMA       Chart routed to Care Team.

## 2021-07-22 NOTE — TELEPHONE ENCOUNTER
please note ACT<20      ACT Total Scores 5/28/2019 12/15/2020 7/22/2021   ACT TOTAL SCORE - - -   ASTHMA ER VISITS - - -   ASTHMA HOSPITALIZATIONS - - -   ACT TOTAL SCORE (Goal Greater than or Equal to 20) 20 13 19   In the past 12 months, how many times did you visit the emergency room for your asthma without being admitted to the hospital? 0 0 0   In the past 12 months, how many times were you hospitalized overnight because of your asthma? 0 0 0

## 2021-07-23 ASSESSMENT — ASTHMA QUESTIONNAIRES: ACT_TOTALSCORE: 19

## 2021-08-12 ENCOUNTER — OFFICE VISIT (OUTPATIENT)
Dept: FAMILY MEDICINE | Facility: CLINIC | Age: 31
End: 2021-08-12
Payer: COMMERCIAL

## 2021-08-12 ENCOUNTER — ANCILLARY PROCEDURE (OUTPATIENT)
Dept: GENERAL RADIOLOGY | Facility: CLINIC | Age: 31
End: 2021-08-12
Attending: FAMILY MEDICINE
Payer: COMMERCIAL

## 2021-08-12 VITALS
SYSTOLIC BLOOD PRESSURE: 122 MMHG | HEART RATE: 80 BPM | TEMPERATURE: 97.2 F | RESPIRATION RATE: 18 BRPM | WEIGHT: 142 LBS | HEIGHT: 65 IN | DIASTOLIC BLOOD PRESSURE: 80 MMHG | BODY MASS INDEX: 23.66 KG/M2

## 2021-08-12 DIAGNOSIS — M54.50 CHRONIC BILATERAL LOW BACK PAIN, UNSPECIFIED WHETHER SCIATICA PRESENT: Primary | ICD-10-CM

## 2021-08-12 DIAGNOSIS — G89.29 CHRONIC BILATERAL LOW BACK PAIN, UNSPECIFIED WHETHER SCIATICA PRESENT: Primary | ICD-10-CM

## 2021-08-12 DIAGNOSIS — M54.50 CHRONIC BILATERAL LOW BACK PAIN, UNSPECIFIED WHETHER SCIATICA PRESENT: ICD-10-CM

## 2021-08-12 DIAGNOSIS — G89.29 CHRONIC BILATERAL LOW BACK PAIN, UNSPECIFIED WHETHER SCIATICA PRESENT: ICD-10-CM

## 2021-08-12 PROCEDURE — 72100 X-RAY EXAM L-S SPINE 2/3 VWS: CPT | Performed by: RADIOLOGY

## 2021-08-12 PROCEDURE — 99214 OFFICE O/P EST MOD 30 MIN: CPT | Performed by: FAMILY MEDICINE

## 2021-08-12 RX ORDER — METHYLPREDNISOLONE 4 MG
TABLET, DOSE PACK ORAL
Qty: 21 TABLET | Refills: 0 | Status: SHIPPED | OUTPATIENT
Start: 2021-08-12 | End: 2022-06-23

## 2021-08-12 ASSESSMENT — MIFFLIN-ST. JEOR: SCORE: 1347.05

## 2021-08-12 NOTE — NURSING NOTE
"Chief Complaint   Patient presents with     Musculoskeletal Problem     /80 (Cuff Size: Adult Regular)   Pulse 80   Temp 97.2  F (36.2  C) (Tympanic)   Resp 18   Ht 1.638 m (5' 4.5\")   Wt 64.4 kg (142 lb)   LMP 07/29/2021   Breastfeeding No   BMI 24.00 kg/m   Estimated body mass index is 24 kg/m  as calculated from the following:    Height as of this encounter: 1.638 m (5' 4.5\").    Weight as of this encounter: 64.4 kg (142 lb).  Patient presents to the clinic using No DME      Health Maintenance that is potentially due pending provider review:    Health Maintenance Due   Topic Date Due     ANNUAL REVIEW OF HM ORDERS  Never done     DEPRESSION ACTION PLAN  Never done     Pneumococcal Vaccine: Pediatrics (0 to 5 Years) and At-Risk Patients (6 to 64 Years) (1 of 2 - PPSV23) Never done     HEPATITIS C SCREENING  Never done     ASTHMA ACTION PLAN  08/09/2018                "

## 2021-08-12 NOTE — PROGRESS NOTES
Assessment & Plan     Chronic bilateral low back pain, unspecified whether sciatica present  Differentials discussed in detail including musculoskeletal etiology.  X-ray findings reviewed independently which showed mild scoliotic curvature involving thoracolumbar spine.  Medrol pack, naproxen prescribed and physical therapy ordered.  Will consider Ortho consult if symptoms persist or worsen.  Patient understood and in agreement with above plan.  All questions answered.  - XR Lumbar Spine 2/3 Views; Future  - methylPREDNISolone (MEDROL DOSEPAK) 4 MG tablet therapy pack; Follow Package Directions  - naproxen (NAPROSYN) 375 MG tablet; Take 1 tablet (375 mg) by mouth 2 times daily (with meals)  - Physical Therapy Referral; Future        Pedro Luis Leavitt MD  [facility]    Oliver Norman is a 31 year old who presents for the following health issues     HPI     Back Pain  Onset/Duration: June 30th   Description:   Location of pain: low back bilateral  Character of pain: sharp with movement and constant  Pain radiation: radiates into the right buttocks and radiates into the left buttocks  New numbness or weakness in legs, not attributed to pain: YES- weakness in her legs. Driving is difficult at times to push the gas and brake peddles   Intensity: moderate, severe  Progression of Symptoms: worsening  History:   Specific cause: 2 days of 8 hour training's while sitting in a child size chair.   Pain interferes with job: YES  History of back problems: no prior back problems  Any previous MRI or X-rays: None  Sees a specialist for back pain: No  Alleviating factors:   Improved by: none    Precipitating factors:  Worsened by: Lifting, Bending, Sitting, laying flat, Walking and Coughing  Therapies tried and outcome: acetaminophen (Tylenol), cold, heat, NSAIDs and rest      Accompanying Signs & Symptoms:  Risk of Fracture: None  Risk of Cauda Equina: None  Risk of Infection: None  Risk of Cancer:  "None  Risk of Ankylosing Spondylitis: Onset at age <35, male, AND morning back stiffness YES      Review of Systems   Constitutional, HEENT, cardiovascular, pulmonary, GI, , musculoskeletal, neuro, skin, endocrine and psych systems are negative, except as otherwise noted.      Objective    /80 (Cuff Size: Adult Regular)   Pulse 80   Temp 97.2  F (36.2  C) (Tympanic)   Resp 18   Ht 1.638 m (5' 4.5\")   Wt 64.4 kg (142 lb)   LMP 07/29/2021   Breastfeeding No   BMI 24.00 kg/m    Body mass index is 24 kg/m .  Physical Exam   GENERAL: alert and no distress  NECK: no adenopathy, no asymmetry, masses, or scars and thyroid normal to palpation  RESP: lungs clear to auscultation - no rales, rhonchi or wheezes  CV: regular rates and rhythm, normal S1 S2, no S3 or S4 and no murmur, click or rub  ABDOMEN: soft, nontender  MS: No spinal/paraspinal tenderness skin discoloration or swelling noted, left-sided SLR equivocal, mild lumbar scoliosis, left lower extremity strength 4+/5, otherwise normal limbs strength, sensation to touch and pressure intact, gait slight antalgic, pedal pulses 3+, normal tone  SKIN: no suspicious lesions or rashes  NEURO: Normal strength and tone, mentation intact and speech normal  PSYCH: mentation appears normal, affect normal/bright          Results for orders placed or performed in visit on 08/12/21   XR Lumbar Spine 2/3 Views     Status: None    Narrative    LUMBAR SPINE TWO TO THREE VIEWS  8/12/2021 6:14 PM     HISTORY: Chronic bilateral low back pain, unspecified whether sciatica  present.   COMPARISON: None.      Impression    IMPRESSION: Mild scoliotic curvature, partially visible of the  thoracolumbar spine, apex right, centered upon the lower thoracic  spine. Alignment otherwise intact. No compression deformity or acute  fracture. 5 nonrib-bearing lumbar vertebrae. Minimal intervertebral  disc space narrowing at L5-S1. SI joints intact.    ROSALIND PRESCOTT MD         SYSTEM ID:  " [id number]

## 2021-08-12 NOTE — PATIENT INSTRUCTIONS
Patient Education     Back Care Tips     Caring for your back  These are things you can do to prevent a recurrence of acute back pain and to reduce symptoms from chronic back pain:    Stay at a healthy weight. If you are overweight, losing weight will help most types of back pain.    Exercise is an important part of recovery from most types of back pain. The muscles behind and in front of the spine support the back. This means strengthening both the back muscles and the abdominal muscles will provide better support for your spine.     Swimming and brisk walking are good overall exercises to improve your fitness level.    Practice safe lifting methods (see below).    Practice good posture when sitting, standing, and walking. Don't sit for a long time. This puts more stress on the lower back than standing or walking.    Wear quality shoes with good arch support. Foot and ankle alignment can affect back symptoms. Don't wear high heels.    Therapeutic massage can help relax the back muscles without stretching them.    During the first 24 to 72 hours after an acute injury or flare-up of chronic back pain, put an ice pack on the painful area for 20 minutes and then remove it for 20 minutes. Do thisover a period of 60 to 90 minutes, or several times a day. As a safety precaution, don't use a heating pad at bedtime. Sleeping on a heating pad can lead to skin burns or tissue damage.    You can alternate using ice and heat.  Medicines  Talk with your healthcare provider before using medicines, especially if you have other health problems or are taking other medicines.    You may use over-the-counter medicines, such as acetaminophen, ibuprofen, or naprosyn to control pain, unless your healthcare provider prescribed other pain medicine. Talk with your healthcare provider before taking any medicines if you have a chronic condition such as diabetes, liver or kidney disease, stomach ulcers, or digestive bleeding, or are taking  blood thinners.    Be careful if you are given prescription pain medicines, opioids, or medicine for muscle spasm. They can cause drowsiness, and affect your coordination, reflexes, and judgment. Don't drive or operate heavy machinery while taking these types of medicines. Take prescription pain medicine only as prescribed by your healthcare provider.  Lumbar stretch  This simple stretch will help relax muscle spasm and keep your back more limber. If exercise makes your back pain worse, don t do it.    Lie on your back with your knees bent and both feet on the ground.    Slowly raise your left knee to your chest as you flatten your lower back against the floor. Hold for 5 seconds.    Relax and repeat the exercise with your right knee.    Do 10 of these exercises for each leg.  Safe lifting method    Don t bend over at the waist to lift an object off the floor.  Instead, bend your knees and hips in a squat.     Keep your back and head upright    Hold the object close to your body, directly in front of you.    Straighten your legs to lift the object.     Lower the object to the floor in the reverse fashion.    If you must slide something across the floor, push it.    Posture tips  Sitting  Sit in chairs with straight backs or low-back support. Keep your knees lower than your hips, with your feet flat on the floor.  When driving, sit up straight. Adjust the seat forward so you are not leaning toward the steering wheel.  A small pillow or rolled towel behind your lower back may help if you are driving long distances.   Standing  When standing for long periods, shift most of your weight to one leg at a time. Switch legs every few minutes.   Sleeping  The best way to sleep is on your side with your knees bent. Put a low pillow under your head to support your neck in a neutral spine position. Don't use thick pillows that bend your neck to one side. Put a pillow between your legs to further relax your lower back. If you  sleep on your back, put pillows under your knees to support your legs in a slightly flexed position. Use a firm mattress. If your mattress sags, replace it, or use a 1/2-inch plywood board under the mattress to add support.  Follow-up care  Follow up with your healthcare provider, or as advised.  If X-rays, a CT scan or an MRI scan were taken, they may be reviewed by a radiologist. You will be told of any new findings that may affect your care.  Call 911  Call 911 if any of the following occur:    Trouble breathing    Confusion    Very drowsy    Fainting or loss of consciousness    Rapid or very slow heart rate    Loss of  bowel or bladder control  When to seek medical advice  Call your healthcare provider right away if any of the following occur:    Pain becomes worse or spreads to your arms or legs    Weakness or numbness in one or both arms or legs    Numbness in the groin area  [employer] last reviewed this educational content on 11/1/2019 2000-2021 [employer]. All rights reserved. This information is not intended as a substitute for professional medical care. Always follow your healthcare professional's instructions.

## 2021-09-19 ENCOUNTER — HEALTH MAINTENANCE LETTER (OUTPATIENT)
Age: 31
End: 2021-09-19

## 2021-09-30 DIAGNOSIS — F33.0 MILD EPISODE OF RECURRENT MAJOR DEPRESSIVE DISORDER (H): ICD-10-CM

## 2021-09-30 DIAGNOSIS — F41.1 GAD (GENERALIZED ANXIETY DISORDER): ICD-10-CM

## 2021-10-04 NOTE — TELEPHONE ENCOUNTER
"Routing refill request to provider for review/approval because:  Last PHQ >5  PHQ 12/9/2020 3/12/2021 4/13/2021   PHQ-9 Total Score 11 15 6   Q9: Thoughts of better off dead/self-harm past 2 weeks Several days Not at all Not at all   F/U: Thoughts of suicide or self-harm No - -   F/U: Safety concerns No - -     Requested Prescriptions   Pending Prescriptions Disp Refills    sertraline (ZOLOFT) 50 MG tablet [Pharmacy Med Name: sertraline 50 mg tablet] 90 tablet 1     Sig: Take 1 tablet (50 mg) by mouth daily        SSRIs Protocol Failed - 9/30/2021  8:03 AM        Failed - PHQ-9 score less than 5 in past 6 months     Please review last PHQ-9 score.           Passed - Medication is active on med list        Passed - Patient is age 18 or older        Passed - No active pregnancy on record        Passed - No positive pregnancy test in last 12 months        Passed - Recent (6 mo) or future (30 days) visit within the authorizing provider's specialty     Patient had office visit in the last 6 months or has a visit in the next 30 days with authorizing provider or within the authorizing provider's specialty.  See \"Patient Info\" tab in inbasket, or \"Choose Columns\" in Meds & Orders section of the refill encounter.                  Lyndsey TAVAREZ RN    "

## 2021-11-08 ENCOUNTER — E-VISIT (OUTPATIENT)
Dept: FAMILY MEDICINE | Facility: CLINIC | Age: 31
End: 2021-11-08
Payer: COMMERCIAL

## 2021-11-08 DIAGNOSIS — Z20.822 SUSPECTED COVID-19 VIRUS INFECTION: Primary | ICD-10-CM

## 2021-11-08 PROCEDURE — 99421 OL DIG E/M SVC 5-10 MIN: CPT | Performed by: FAMILY MEDICINE

## 2021-11-08 NOTE — PATIENT INSTRUCTIONS
Dear Fatimah Roy,    Your symptoms show that you may have coronavirus (COVID-19). This illness can cause fever, cough and trouble breathing. Many people get a mild case and get better on their own. Some people can get very sick.    Will I be tested for COVID-19?  We would like to test you for Covid-19 virus. I have placed orders for this test.     To schedule: go to your [employer] home page and scroll down to the section that says  You have an appointment that needs to be scheduled  and click the large green button that says  Schedule Now  and follow the steps to find the next available openings.    If you are unable to complete these [employer] scheduling steps, please call [phone] to schedule your testing.     Return to work/school/ guidance:  Please let your workplace manager and staffing office know when your quarantine ends     We can t give you an exact date as it depends on the above. You can calculate this on your own or work with your manager/staffing office to calculate this. (For example if you were exposed on 10/4, you would have to quarantine for 14 full days. That would be through 10/18. You could return on 10/19.)      If you receive a positive COVID-19 test result, follow the guidance of the those who are giving you the results. Usually the return to work is 10 (or in some cases 20 days from symptom onset.) If you work at [facility], you must also be cleared by Employee Occupational Health and Safety to return to work.        If you receive a negative COVID-19 test result and did not have a high risk exposure to someone with a known positive COVID-19 test, you can return to work once you're free of fever for 24 hours without fever-reducing medication and your symptoms are improving or resolved.      If you receive a negative COVID-19 test and If you had a high risk exposure to someone who has tested positive for COVID-19 then you can return to work 14 days after your last contact  with the positive individual    Note: If you have ongoing exposure to the covid positive person, this quarantine period may be more than 14 days. (For example, if you are continued to be exposed to your child who tested positive and cannot isolate from them, then the quarantine of 7-14 days can't start until your child is no longer contagious. This is typically 10 days from onset of the child's symptoms. So the total duration may be 17-24 days in this case.)    Sign up for [employer].   We know it's scary to hear that you might have COVID-19. We want to track your symptoms to make sure you're okay over the next 2 weeks. Please look for an email from [employer]--this is a free, online program that we'll use to keep in touch. To sign up, follow the link in the email you will receive. Learn more at [email or web address]    How can I take care of myself?    Get lots of rest. Drink extra fluids (unless a doctor has told you not to)    Take Tylenol (acetaminophen) or ibuprofen for fever or pain. If you have liver or kidney problems, ask your family doctor if it's okay to take Tylenol o ibuprofen    If you have other health problems (like cancer, heart failure, an organ transplant or severe kidney disease): Call your specialty clinic if you don't feel better in the next 2 days.    Know when to call 911. Emergency warning signs include:  o Trouble breathing or shortness of breath  o Pain or pressure in the chest that doesn't go away  o Feeling confused like you haven't felt before, or not being able to wake up  o Bluish-colored lips or face    Where can I get more information?  [facility] - About COVID-19:   [email or web address]    CDC - What to Do If You're Sick:   www.cdc.gov/coronavirus/2019-ncov/about/steps-when-sick.html

## 2021-11-09 ENCOUNTER — NURSE TRIAGE (OUTPATIENT)
Dept: CARE COORDINATION | Facility: CLINIC | Age: 31
End: 2021-11-09
Payer: COMMERCIAL

## 2021-11-09 NOTE — TELEPHONE ENCOUNTER
"  Patient reported shortness of breath on her [employer] survey. Symptoms started yesterday. Had E-visit and is planning on testing for COVID-19 today at [facility]. Will send additional testing site options in [employer]. Patient with mild symptoms only but she will update in the [employer] Loop with concerns or worsening.     Answer Assessment - Initial Assessment Questions  1. RESPIRATORY STATUS: \"Describe your breathing?\" (e.g., wheezing, shortness of breath, unable to speak, severe coughing)       No SOB. Feels like it is a little harder to breath due to chest tightness  2. ONSET: \"When did this breathing problem begin?\"       yesterday  3. PATTERN \"Does the difficult breathing come and go, or has it been constant since it started?\"       Comes and goes  4. SEVERITY: \"How bad is your breathing?\" (e.g., mild, moderate, severe)     - MILD: No SOB at rest, mild SOB with walking, speaks normally in sentences, can lay down, no retractions, pulse < 100.     - MODERATE: SOB at rest, SOB with minimal exertion and prefers to sit, cannot lie down flat, speaks in phrases, mild retractions, audible wheezing, pulse 100-120.     - SEVERE: Very SOB at rest, speaks in single words, struggling to breathe, sitting hunched forward, retractions, pulse > 120       mild  5. RECURRENT SYMPTOM: \"Have you had difficulty breathing before?\" If so, ask: \"When was the last time?\" and \"What happened that time?\"       no  6. CARDIAC HISTORY: \"Do you have any history of heart disease?\" (e.g., heart attack, angina, bypass surgery, angioplasty)       no  7. LUNG HISTORY: \"Do you have any history of lung disease?\"  (e.g., pulmonary embolus, asthma, emphysema)      no  8. CAUSE: \"What do you think is causing the breathing problem?\"       Viral illness  9. OTHER SYMPTOMS: \"Do you have any other symptoms? (e.g., dizziness, runny nose, cough, chest pain, fever)      Sore throat, mild cough with scant yellow/green thick mucous, body aches, " "headache  10. PREGNANCY: \"Is there any chance you are pregnant?\" \"When was your last menstrual period?\"        Not asked  11. TRAVEL: \"Have you traveled out of the country in the last month?\" (e.g., travel history, exposures)        no    Protocols used: BREATHING DIFFICULTY-A-AH      "

## 2021-11-10 ENCOUNTER — LAB (OUTPATIENT)
Dept: FAMILY MEDICINE | Facility: CLINIC | Age: 31
End: 2021-11-10
Attending: FAMILY MEDICINE
Payer: COMMERCIAL

## 2021-11-10 DIAGNOSIS — Z20.822 SUSPECTED COVID-19 VIRUS INFECTION: ICD-10-CM

## 2021-11-10 PROCEDURE — U0005 INFEC AGEN DETEC AMPLI PROBE: HCPCS

## 2021-11-10 PROCEDURE — U0003 INFECTIOUS AGENT DETECTION BY NUCLEIC ACID (DNA OR RNA); SEVERE ACUTE RESPIRATORY SYNDROME CORONAVIRUS 2 (SARS-COV-2) (CORONAVIRUS DISEASE [COVID-19]), AMPLIFIED PROBE TECHNIQUE, MAKING USE OF HIGH THROUGHPUT TECHNOLOGIES AS DESCRIBED BY CMS-2020-01-R: HCPCS

## 2021-11-11 LAB — SARS-COV-2 RNA RESP QL NAA+PROBE: NEGATIVE

## 2022-01-15 NOTE — PLAN OF CARE
Bedside shift change report given to [address] RN (oncoming nurse) by Hayde Sinha (offgoing nurse). Report included the following information SBAR, Kardex, ED Summary, Intake/Output, MAR, Recent Results and Cardiac Rhythm SB.     Dual neuro assessment and NIH performed Pt follows PP pathway without incident, tolerates po meds for pain which are working well for her.  She is up and about independently caring for herself & her . She is breastfeeding, utilizing a shield, her milk is starting to come in; also supplementing for infant weight loss.  FOB supportive, bonding well; infant rooming in with mother tonight.  Will continue to monitor & update as needed.

## 2022-01-31 DIAGNOSIS — Z30.019 ENCOUNTER FOR INITIAL PRESCRIPTION OF CONTRACEPTIVES, UNSPECIFIED CONTRACEPTIVE: ICD-10-CM

## 2022-02-01 RX ORDER — ETHYNODIOL DIACETATE AND ETHINYL ESTRADIOL 1 MG-35MCG
KIT ORAL
Qty: 112 TABLET | Refills: 0 | Status: SHIPPED | OUTPATIENT
Start: 2022-02-01 | End: 2022-05-03

## 2022-03-06 ENCOUNTER — HEALTH MAINTENANCE LETTER (OUTPATIENT)
Age: 32
End: 2022-03-06

## 2022-04-06 ENCOUNTER — MYC MEDICAL ADVICE (OUTPATIENT)
Dept: FAMILY MEDICINE | Facility: CLINIC | Age: 32
End: 2022-04-06
Payer: COMMERCIAL

## 2022-04-06 DIAGNOSIS — F33.0 MILD EPISODE OF RECURRENT MAJOR DEPRESSIVE DISORDER (H): ICD-10-CM

## 2022-04-06 DIAGNOSIS — F41.1 GAD (GENERALIZED ANXIETY DISORDER): ICD-10-CM

## 2022-04-06 ASSESSMENT — PATIENT HEALTH QUESTIONNAIRE - PHQ9
SUM OF ALL RESPONSES TO PHQ QUESTIONS 1-9: 0
SUM OF ALL RESPONSES TO PHQ QUESTIONS 1-9: 0
10. IF YOU CHECKED OFF ANY PROBLEMS, HOW DIFFICULT HAVE THESE PROBLEMS MADE IT FOR YOU TO DO YOUR WORK, TAKE CARE OF THINGS AT HOME, OR GET ALONG WITH OTHER PEOPLE: NOT DIFFICULT AT ALL

## 2022-04-07 ASSESSMENT — PATIENT HEALTH QUESTIONNAIRE - PHQ9: SUM OF ALL RESPONSES TO PHQ QUESTIONS 1-9: 0

## 2022-05-03 ENCOUNTER — TELEPHONE (OUTPATIENT)
Dept: FAMILY MEDICINE | Facility: CLINIC | Age: 32
End: 2022-05-03
Payer: COMMERCIAL

## 2022-05-03 DIAGNOSIS — Z30.019 ENCOUNTER FOR INITIAL PRESCRIPTION OF CONTRACEPTIVES, UNSPECIFIED CONTRACEPTIVE: ICD-10-CM

## 2022-05-03 RX ORDER — ETHYNODIOL DIACETATE AND ETHINYL ESTRADIOL 1 MG-35MCG
KIT ORAL
Qty: 112 TABLET | Refills: 3 | Status: SHIPPED | OUTPATIENT
Start: 2022-05-03 | End: 2023-03-30

## 2022-05-03 NOTE — TELEPHONE ENCOUNTER
Patient Quality Outreach    Patient is due for the following:   Asthma  -  ACT needed  Cervical Cancer Screening - PAP Needed    NEXT STEPS:   Schedule a yearly physical    Type of outreach:    Sent Vers[employer] message.    Next Steps:  Reach out within 90 days via Versly.    Max number of attempts reached: No. Will try again in 90 days if patient still on fail list.    Questions for provider review:    None     Lyndsey Lowery, [facility]  Chart routed to Care Team.

## 2022-06-23 ENCOUNTER — OFFICE VISIT (OUTPATIENT)
Dept: FAMILY MEDICINE | Facility: CLINIC | Age: 32
End: 2022-06-23
Payer: COMMERCIAL

## 2022-06-23 VITALS
SYSTOLIC BLOOD PRESSURE: 122 MMHG | OXYGEN SATURATION: 99 % | HEART RATE: 88 BPM | BODY MASS INDEX: 23.16 KG/M2 | TEMPERATURE: 97.3 F | DIASTOLIC BLOOD PRESSURE: 80 MMHG | HEIGHT: 65 IN | RESPIRATION RATE: 14 BRPM | WEIGHT: 139 LBS

## 2022-06-23 DIAGNOSIS — F41.1 GAD (GENERALIZED ANXIETY DISORDER): ICD-10-CM

## 2022-06-23 DIAGNOSIS — F33.0 MILD EPISODE OF RECURRENT MAJOR DEPRESSIVE DISORDER (H): ICD-10-CM

## 2022-06-23 DIAGNOSIS — Z00.00 ROUTINE GENERAL MEDICAL EXAMINATION AT A HEALTH CARE FACILITY: Primary | ICD-10-CM

## 2022-06-23 DIAGNOSIS — Z12.4 CERVICAL CANCER SCREENING: ICD-10-CM

## 2022-06-23 DIAGNOSIS — J45.40 MODERATE PERSISTENT ASTHMA WITHOUT COMPLICATION: ICD-10-CM

## 2022-06-23 DIAGNOSIS — Z11.59 NEED FOR HEPATITIS C SCREENING TEST: ICD-10-CM

## 2022-06-23 PROCEDURE — 99214 OFFICE O/P EST MOD 30 MIN: CPT | Mod: 25 | Performed by: NURSE PRACTITIONER

## 2022-06-23 PROCEDURE — G0145 SCR C/V CYTO,THINLAYER,RESCR: HCPCS | Performed by: NURSE PRACTITIONER

## 2022-06-23 PROCEDURE — 99395 PREV VISIT EST AGE 18-39: CPT | Performed by: NURSE PRACTITIONER

## 2022-06-23 PROCEDURE — 87624 HPV HI-RISK TYP POOLED RSLT: CPT | Performed by: NURSE PRACTITIONER

## 2022-06-23 RX ORDER — ALBUTEROL SULFATE 90 UG/1
AEROSOL, METERED RESPIRATORY (INHALATION)
Qty: 18 G | Refills: 5 | Status: SHIPPED | OUTPATIENT
Start: 2022-06-23 | End: 2023-08-04

## 2022-06-23 ASSESSMENT — ENCOUNTER SYMPTOMS
PARESTHESIAS: 0
CONSTIPATION: 0
COUGH: 0
EYE PAIN: 0
BREAST MASS: 0
JOINT SWELLING: 0
HEMATURIA: 0
SHORTNESS OF BREATH: 0
MYALGIAS: 0
HEADACHES: 0
PALPITATIONS: 0
DIARRHEA: 0
CHILLS: 0
DIZZINESS: 0
NAUSEA: 0
HEMATOCHEZIA: 0
NERVOUS/ANXIOUS: 0
FREQUENCY: 0
DYSURIA: 0
SORE THROAT: 0
ARTHRALGIAS: 0
WEAKNESS: 0
HEARTBURN: 0
ABDOMINAL PAIN: 0
FEVER: 0

## 2022-06-23 ASSESSMENT — PAIN SCALES - GENERAL: PAINLEVEL: NO PAIN (0)

## 2022-06-23 NOTE — PROGRESS NOTES
SUBJECTIVE:   CC: Fatimah Roy is an 32 year old woman who presents for preventive health visit.       Patient has been advised of split billing requirements and indicates understanding: Yes  Healthy Habits:     Getting at least 3 servings of Calcium per day:  Yes    Bi-annual eye exam:  Yes    Sleep apnea or symptoms of sleep apnea:  None    Diet:  Regular (no restrictions)    Frequency of exercise:  2-3 days/week    Duration of exercise:  15-30 minutes    Taking medications regularly:  Yes    Medication side effects:  None    PHQ-2 Total Score: 0    Additional concerns today:  No        Mole hair line not changing   Asthma has been good.   Night time awakenings two times a week and albuterol is good.  Moods are stable sertraline has been a blessing. No SE. Working very well.   Taking ORAL BIRTH CONTROL continuous. No menses this year.   Env allergies under good control with zyrtec          Today's PHQ-2 Score:   PHQ-2 ( 1999 Pfizer) 6/23/2022   Q1: Little interest or pleasure in doing things 0   Q2: Feeling down, depressed or hopeless 0   PHQ-2 Score 0   PHQ-2 Total Score (12-17 Years)- Positive if 3 or more points; Administer PHQ-A if positive -   Q1: Little interest or pleasure in doing things Not at all   Q2: Feeling down, depressed or hopeless Not at all   PHQ-2 Score 0       Abuse: Current or Past (Physical, Sexual or Emotional) - No  Do you feel safe in your environment? Yes        Social History     Tobacco Use     Smoking status: Never Smoker     Smokeless tobacco: Never Used   Substance Use Topics     Alcohol use: Not Currently     Alcohol/week: 0.0 standard drinks     Comment: rare- quit with pregnancy     If you drink alcohol do you typically have >3 drinks per day or >7 drinks per week? No    Alcohol Use 6/23/2022   Prescreen: >3 drinks/day or >7 drinks/week? No   Prescreen: >3 drinks/day or >7 drinks/week? -   No flowsheet data found.    Reviewed orders with patient.  Reviewed health  maintenance and updated orders accordingly - Yes  Patient Active Problem List   Diagnosis     CARDIOVASCULAR SCREENING; LDL GOAL LESS THAN 160     Intermittent asthma     Seasonal allergic rhinitis     Chronic allergic conjunctivitis     Mild episode of recurrent major depressive disorder (H)     PATRIC (generalized anxiety disorder)     Past Surgical History:   Procedure Laterality Date      SECTION N/A 3/24/2017    Procedure:  SECTION;  Surgeon: Renetta Saeed MD;  Location: [facility]      SECTION N/A 3/29/2019    Procedure:  SECTION, Repeat;  Surgeon: Renetta Saeed MD;  Location: [facility]     ESOPHAGOSCOPY, GASTROSCOPY, DUODENOSCOPY (EGD), COMBINED  2012    Procedure: COMBINED ESOPHAGOSCOPY, GASTROSCOPY, DUODENOSCOPY (EGD);  Gastroscopy;  Surgeon: Pete Arciniega MD;  Location: [facility]     MOUTH SURGERY      wisdom teeth       Social History     Tobacco Use     Smoking status: Never Smoker     Smokeless tobacco: Never Used   Substance Use Topics     Alcohol use: Not Currently     Alcohol/week: 0.0 standard drinks     Comment: rare- quit with pregnancy     Family History   Problem Relation Age of Onset     Cancer Mother         cervical      Hypertension Mother      Gastrointestinal Disease Mother         diverticulitis     Allergy (Severe) Mother         Bee Venom and Fire Ant     Osteoporosis Mother      Heart Disease Maternal Grandmother         MI     Mental Illness Maternal Grandmother         Paranoia Dementia     Emphysema Maternal Grandfather      Heart Disease Paternal Grandfather         MI     Hepatitis Father      Substance Abuse Father         Sober for 12years-alcohol     Depression Father      Anxiety Disorder Father      C.A.D. No family hx of      Diabetes No family hx of      Cerebrovascular Disease No family hx of      Breast Cancer No family hx of      Cancer - colorectal No family hx of          Current Outpatient Medications   Medication Sig Dispense  Refill     albuterol (VENTOLIN HFA) 108 (90 Base) MCG/ACT inhaler Inhale 2 puffs into the lungs every 6 hours as needed for shortness of breath / dyspnea or wheezing 18 g 5     sertraline (ZOLOFT) 50 MG tablet Take 1 tablet (50 mg) by mouth daily 90 tablet 3     cetirizine (ZYRTEC) 10 MG tablet Take 10 mg by mouth daily       ethynodiol-ethinyl estradiol (KELNOR) 1-35 MG-MCG tablet TAKE 1 tablet by MOUTH daily IN continuous regimen 112 tablet 3     hydrOXYzine (ATARAX) 25 MG tablet Take 1-2 tablets (25-50 mg) by mouth 3 times daily as needed for anxiety (or at bedtime) 30 tablet 0     Allergies   Allergen Reactions     Nkda [No Known Drug Allergies]        Breast Cancer Screening:    Breast CA Risk Assessment (FHS-7) 2022   Do you have a family history of breast, colon, or ovarian cancer? No / Unknown       click delete button to remove this line now  Patient under 40 years of age: Routine Mammogram Screening not recommended.   Pertinent mammograms are reviewed under the imaging tab.    History of abnormal Pap smear: NO - age 30-65 PAP every 5 years with negative HPV co-testing recommended  PAP / HPV 5/10/2019 2016 2014   PAP (Historical) NIL NIL NIL     Reviewed and updated as needed this visit by clinical staff   Tobacco  Allergies  Meds   Med Hx  Surg Hx  Fam Hx  Soc Hx          Reviewed and updated as needed this visit by Provider                   Past Medical History:   Diagnosis Date     Chickenpox      Uncomplicated asthma       Past Surgical History:   Procedure Laterality Date      SECTION N/A 3/24/2017    Procedure:  SECTION;  Surgeon: Renetta Saeed MD;  Location: [facility]      SECTION N/A 3/29/2019    Procedure:  SECTION, Repeat;  Surgeon: Renetta Saeed MD;  Location: [facility]     ESOPHAGOSCOPY, GASTROSCOPY, DUODENOSCOPY (EGD), COMBINED  2012    Procedure: COMBINED ESOPHAGOSCOPY, GASTROSCOPY, DUODENOSCOPY (EGD);  Gastroscopy;  Surgeon: Suze  "Pete Rod MD;  Location: [facility]     MOUTH SURGERY      wisdom teeth     OB History    Para Term  AB Living   2 2 2 0 0 2   SAB IAB Ectopic Multiple Live Births   0 0 0 0 2      # Outcome Date GA Lbr Alon/2nd Weight Sex Delivery Anes PTL Lv   2 Term 19 39w1d  3.289 kg (7 lb 4 oz) F CS-LTranv Spinal  BRANNON      Birth Comments: PNP in attendance at delivery for repeat .  Infant delivered and brought to warmer for drying/stimulation.  Apgars 9 and 9, no interventions needed.      Name: Noemi      Apgar1: 9  Apgar5: 9   1 Term 17 39w0d  3.317 kg (7 lb 5 oz) M CS-LTranv Spinal  BRANNON      Birth Comments: breech      Name: Tobin      Apgar1: 9  Apgar5: 9       Review of Systems   Constitutional: Negative for chills and fever.   HENT: Negative for congestion, ear pain, hearing loss and sore throat.    Eyes: Negative for pain and visual disturbance.   Respiratory: Negative for cough and shortness of breath.    Cardiovascular: Negative for chest pain, palpitations and peripheral edema.   Gastrointestinal: Negative for abdominal pain, constipation, diarrhea, heartburn, hematochezia and nausea.   Breasts:  Negative for tenderness, breast mass and discharge.   Genitourinary: Negative for dysuria, frequency, genital sores, hematuria, pelvic pain, urgency, vaginal bleeding and vaginal discharge.   Musculoskeletal: Negative for arthralgias, joint swelling and myalgias.   Skin: Negative for rash.   Neurological: Negative for dizziness, weakness, headaches and paresthesias.   Psychiatric/Behavioral: Negative for mood changes. The patient is not nervous/anxious.           OBJECTIVE:   /80   Pulse 88   Temp 97.3  F (36.3  C) (Tympanic)   Resp 14   Ht 1.638 m (5' 4.5\")   Wt 63 kg (139 lb)   LMP  (LMP Unknown)   SpO2 99%   BMI 23.49 kg/m    Physical Exam  GENERAL: healthy, alert and no distress  EYES: Eyes grossly normal to inspection, PERRL and conjunctivae and sclerae normal  HENT: ear " canals and TM's normal, nose and mouth without ulcers or lesions  NECK: no adenopathy, no asymmetry, masses, or scars and thyroid normal to palpation  RESP: lungs clear to auscultation - no rales, rhonchi or wheezes  BREAST: normal without masses, tenderness or nipple discharge and no palpable axillary masses or adenopathy  CV: regular rate and rhythm, normal S1 S2, no S3 or S4, no murmur, click or rub, no peripheral edema and peripheral pulses strong  ABDOMEN: soft, nontender, no hepatosplenomegaly, no masses and bowel sounds normal   (female): normal female external genitalia, normal urethral meatus, vaginal mucosa pink, moist, well rugated, and normal cervix/adnexa/uterus without masses or discharge  MS: no gross musculoskeletal defects noted, no edema  SKIN: no suspicious lesions or rashes hyperpigmented mole anterior scalp unchanged. Non tender.   NEURO: Normal strength and tone, mentation intact and speech normal  PSYCH: mentation appears normal, affect normal/bright  LYMPH: no cervical, supraclavicular, axillary, or inguinal adenopathy    Diagnostic Test Results:  Labs reviewed in Epic    ASSESSMENT/PLAN:   Fatimah was seen today for physical.    Diagnoses and all orders for this visit:    Routine general medical examination at a health care facility    Need for hepatitis C screening test  Decline need no risk factors     Cervical cancer screening  -     Pap Screen with HPV - recommended age 30 - 65 years    Mild episode of recurrent major depressive disorder (H)  Continue   -     sertraline (ZOLOFT) 50 MG tablet; Take 1 tablet (50 mg) by mouth daily  Labs to monitor fasting   -     CBC with platelets and differential; Future  -     Lipid panel reflex to direct LDL Fasting; Future  -     Comprehensive metabolic panel (BMP + Alb, Alk Phos, ALT, AST, Total. Bili, TP); Future  -     TSH with free T4 reflex; Future    Moderate persistent asthma without complication  -     albuterol (VENTOLIN HFA) 108 (90 Base)  "MCG/ACT inhaler; Inhale 2 puffs into the lungs every 6 hours as needed for shortness of breath / dyspnea or wheezing  Stable preventative declined.     PATRIC (generalized anxiety disorder)  Much improved  Continue   -     sertraline (ZOLOFT) 50 MG tablet; Take 1 tablet (50 mg) by mouth daily  Labs to monitor   -     CBC with platelets and differential; Future  -     Lipid panel reflex to direct LDL Fasting; Future  -     Comprehensive metabolic panel (BMP + Alb, Alk Phos, ALT, AST, Total. Bili, TP); Future  -     TSH with free T4 reflex; Future    Other orders  -     REVIEW OF HEALTH MAINTENANCE PROTOCOL ORDERS        Patient has been advised of split billing requirements and indicates understanding: Yes    COUNSELING:  Reviewed preventive health counseling, as reflected in patient instructions    Estimated body mass index is 23.49 kg/m  as calculated from the following:    Height as of this encounter: 1.638 m (5' 4.5\").    Weight as of this encounter: 63 kg (139 lb).        She reports that she has never smoked. She has never used smokeless tobacco.    Call or return to the clinic with any worsening of symptoms or no resolution. Patient/Parent verbalized understanding and is in agreement. Medication side effects reviewed.   Current Outpatient Medications   Medication Sig Dispense Refill     albuterol (VENTOLIN HFA) 108 (90 Base) MCG/ACT inhaler Inhale 2 puffs into the lungs every 6 hours as needed for shortness of breath / dyspnea or wheezing 18 g 5     sertraline (ZOLOFT) 50 MG tablet Take 1 tablet (50 mg) by mouth daily 90 tablet 3     cetirizine (ZYRTEC) 10 MG tablet Take 10 mg by mouth daily       ethynodiol-ethinyl estradiol (KELNOR) 1-35 MG-MCG tablet TAKE 1 tablet by MOUTH daily IN continuous regimen 112 tablet 3     hydrOXYzine (ATARAX) 25 MG tablet Take 1-2 tablets (25-50 mg) by mouth 3 times daily as needed for anxiety (or at bedtime) 30 tablet 0     Chart documentation with Dragon Voice recognition " Software. Although reviewed after completion, some words and grammatical errors may remain.  See HECTOR Ragland CNP  [facility]

## 2022-06-23 NOTE — LETTER
My Depression Action Plan  Name: Fatimah Roy   Date of Birth 1990  Date: 6/23/2022    My doctor: Elif Lazar   My clinic: [facility]  [address] [place] [id number] [phone]          GREEN    ZONE   Good Control    What it looks like:     Things are going generally well. You have normal ups and downs. You may even feel depressed from time to time, but bad moods usually last less than a day.   What you need to do:  1. Continue to care for yourself (see self care plan)  2. Check your depression survival kit and update it as needed  3. Follow your physician s recommendations including any medication.  4. Do not stop taking medication unless you consult with your physician first.           YELLOW         ZONE Getting Worse    What it looks like:     Depression is starting to interfere with your life.     It may be hard to get out of bed; you may be starting to isolate yourself from others.    Symptoms of depression are starting to last most all day and this has happened for several days.     You may have suicidal thoughts but they are not constant.   What you need to do:     1. Call your care team. Your response to treatment will improve if you keep your care team informed of your progress. Yellow periods are signs an adjustment may need to be made.     2. Continue your self-care.  Just get dressed and ready for the day.  Don't give yourself time to talk yourself out of it.    3. Talk to someone in your support network.    4. Open up your Depression Self-Care Plan/Wellness Kit.           RED    ZONE Medical Alert - Get Help    What it looks like:     Depression is seriously interfering with your life.     You may experience these or other symptoms: You can t get out of bed most days, can t work or engage in other necessary activities, you have trouble taking care of basic hygiene, or basic responsibilities, thoughts of suicide or death that  will not go away, self-injurious behavior.     What you need to do:  1. Call your care team and request a same-day appointment. If they are not available (weekends or after hours) call your local crisis line, emergency room or 911.          Depression Self-Care Plan / Wellness Kit    Many people find that medication and therapy are helpful treatments for managing depression. In addition, making small changes to your everyday life can help to boost your mood and improve your wellbeing. Below are some tips for you to consider. Be sure to talk with your medical provider and/or behavioral health consultant if your symptoms are worsening or not improving.     Sleep   Sleep hygiene  means all of the habits that support good, restful sleep. It includes maintaining a consistent bedtime and wake time, using your bedroom only for sleeping or sex, and keeping the bedroom dark and free of distractions like a computer, smartphone, or television.     Develop a Healthy Routine  Maintain good hygiene. Get out of bed in the morning, make your bed, brush your teeth, take a shower, and get dressed. Don t spend too much time viewing media that makes you feel stressed. Find time to relax each day.    Exercise  Get some form of exercise every day. This will help reduce pain and release endorphins, the  feel good  chemicals in your brain. It can be as simple as just going for a walk or doing some gardening, anything that will get you moving.      Diet  Strive to eat healthy foods, including fruits and vegetables. Drink plenty of water. Avoid excessive sugar, caffeine, alcohol, and other mood-altering substances.     Stay Connected with Others  Stay in touch with friends and family members.    Manage Your Mood  Try deep breathing, massage therapy, biofeedback, or meditation. Take part in fun activities when you can. Try to find something to smile about each day.     Psychotherapy  Be open to working with a therapist if your provider  recommends it.     Medication  Be sure to take your medication as prescribed. Most anti-depressants need to be taken every day. It usually takes several weeks for medications to work. Not all medicines work for all people. It is important to follow-up with your provider to make sure you have a treatment plan that is working for you. Do not stop your medication abruptly without first discussing it with your provider.    Crisis Resources   These hotlines are for both adults and children. They and are open 24 hours a day, 7 days a week unless noted otherwise.      National Suicide Prevention Lifeline   1-668-992-PQEF (0872)      Crisis Text Line    www.crisistextline.org  Text HOME to 777670 from anywhere in the [place], anytime, about any type of crisis. A live, trained crisis counselor will receive the text and respond quickly.      [facility] Lifeline for LGBTQ Youth  A national crisis intervention and suicide lifeline for LGBTQ youth under 25. Provides a safe place to talk without judgement. Call 1-786.591.2343; text START to 392896 or visit www.thetrevorproject.org to talk to a trained counselor.      For [facility] crisis numbers, visit the [facility] website at:  https://[place].gov/dhs/people-we-serve/adults/health-care/mental-health/resources/crisis-contacts.jsp

## 2022-06-23 NOTE — LETTER
My Asthma Action Plan    Name: Fatimah Roy   YOB: 1990  Date: 6/23/2022   My doctor: HECTOR Trimble CNP   My clinic: [facility]        My Rescue Medicine:   Albuterol inhaler (Proair/Ventolin/Proventil HFA)  2-4 puffs EVERY 4 HOURS as needed. Use a spacer if recommended by your provider.   My Asthma Severity:   Intermittent / Exercise Induced  Know your asthma triggers: j  patient had a baby in March, has had issues with asthma ever since.          GREEN ZONE   Good Control    I feel good    No cough or wheeze    Can work, sleep and play without asthma symptoms       Take your asthma control medicine every day.     1. If exercise triggers your asthma, take your rescue medication    15 minutes before exercise or sports, and    During exercise if you have asthma symptoms  2. Spacer to use with inhaler: If you have a spacer, make sure to use it with your inhaler             YELLOW ZONE Getting Worse  I have ANY of these:    I do not feel good    Cough or wheeze    Chest feels tight    Wake up at night   1. Keep taking your Green Zone medications  2. Start taking your rescue medicine:    every 20 minutes for up to 1 hour. Then every 4 hours for 24-48 hours.  3. If you stay in the Yellow Zone for more than 12-24 hours, contact your doctor.  4. If you do not return to the Green Zone in 12-24 hours or you get worse, start taking your oral steroid medicine if prescribed by your provider.           RED ZONE Medical Alert - Get Help  I have ANY of these:    I feel awful    Medicine is not helping    Breathing getting harder    Trouble walking or talking    Nose opens wide to breathe       1. Take your rescue medicine NOW  2. If your provider has prescribed an oral steroid medicine, start taking it NOW  3. Call your doctor NOW  4. If you are still in the Red Zone after 20 minutes and you have not reached your doctor:    Take your rescue medicine again and    Call  911 or go to the emergency room right away    See your regular doctor within 2 weeks of an Emergency Room or Urgent Care visit for follow-up treatment.          Annual Reminders:  Meet with Asthma Educator,  Flu Shot in the Fall, consider Pneumonia Vaccination for patients with asthma (aged 19 and older).    Pharmacy:    [id number] - [place], [place] - [address] [facility] - [facility] [address] [place] [address]    Electronically signed by HECTOR Trimble CNP   Date: 06/23/22                    Asthma Triggers  How To Control Things That Make Your Asthma Worse    Triggers are things that make your asthma worse.  Look at the list below to help you find your triggers and   what you can do about them. You can help prevent asthma flare-ups by staying away from your triggers.      Trigger                                                          What you can do   Cigarette Smoke  Tobacco smoke can make asthma worse. Do not allow smoking in your home, car or around you.  Be sure no one smokes at a child s day care or school.  If you smoke, ask your health care provider for ways to help you quit.  Ask family members to quit too.  Ask your health care provider for a referral to Quit Plan to help you quit smoking, or call 5-591-678-PLAN.     Colds, Flu, Bronchitis  These are common triggers of asthma. Wash your hands often.  Don t touch your eyes, nose or mouth.  Get a flu shot every year.     Dust Mites  These are tiny bugs that live in cloth or carpet. They are too small to see. Wash sheets and blankets in hot water every week.   Encase pillows and mattress in dust mite proof covers.  Avoid having carpet if you can. If you have carpet, vacuum weekly.   Use a dust mask and HEPA vacuum.   Pollen and Outdoor Mold  Some people are allergic to trees, grass, or weed pollen, or molds. Try to keep your windows closed.  Limit time out doors when pollen count is high.   Ask you  health care provider about taking medicine during allergy season.     Animal Dander  Some people are allergic to skin flakes, urine or saliva from pets with fur or feathers. Keep pets with fur or feathers out of your home.    If you can t keep the pet outdoors, then keep the pet out of your bedroom.  Keep the bedroom door closed.  Keep pets off cloth furniture and away from stuffed toys.     Mice, Rats, and Cockroaches  Some people are allergic to the waste from these pests.   Cover food and garbage.  Clean up spills and food crumbs.  Store grease in the refrigerator.   Keep food out of the bedroom.   Indoor Mold  This can be a trigger if your home has high moisture. Fix leaking faucets, pipes, or other sources of water.   Clean moldy surfaces.  Dehumidify basement if it is damp and smelly.   Smoke, Strong Odors, and Sprays  These can reduce air quality. Stay away from strong odors and sprays, such as perfume, powder, hair spray, paints, smoke incense, paint, cleaning products, candles and new carpet.   Exercise or Sports  Some people with asthma have this trigger. Be active!  Ask your doctor about taking medicine before sports or exercise to prevent symptoms.    Warm up for 5-10 minutes before and after sports or exercise.     Other Triggers of Asthma  Cold air:  Cover your nose and mouth with a scarf.  Sometimes laughing or crying can be a trigger.  Some medicines and food can trigger asthma.

## 2022-06-28 LAB
BKR LAB AP GYN ADEQUACY: NORMAL
BKR LAB AP GYN INTERPRETATION: NORMAL
BKR LAB AP HPV REFLEX: NORMAL
BKR LAB AP LMP: NORMAL
BKR LAB AP PREVIOUS ABNORMAL: NORMAL
PATH REPORT.COMMENTS IMP SPEC: NORMAL
PATH REPORT.COMMENTS IMP SPEC: NORMAL
PATH REPORT.RELEVANT HX SPEC: NORMAL

## 2022-06-30 LAB
HUMAN PAPILLOMA VIRUS 16 DNA: NEGATIVE
HUMAN PAPILLOMA VIRUS 18 DNA: NEGATIVE
HUMAN PAPILLOMA VIRUS FINAL DIAGNOSIS: NORMAL
HUMAN PAPILLOMA VIRUS OTHER HR: NEGATIVE

## 2022-08-23 ENCOUNTER — MYC REFILL (OUTPATIENT)
Dept: FAMILY MEDICINE | Facility: CLINIC | Age: 32
End: 2022-08-23

## 2022-08-23 DIAGNOSIS — F41.1 GAD (GENERALIZED ANXIETY DISORDER): ICD-10-CM

## 2022-08-23 RX ORDER — HYDROXYZINE HYDROCHLORIDE 25 MG/1
25-50 TABLET, FILM COATED ORAL 3 TIMES DAILY PRN
Qty: 30 TABLET | Refills: 3 | Status: SHIPPED | OUTPATIENT
Start: 2022-08-23 | End: 2023-09-13

## 2022-11-21 ENCOUNTER — HEALTH MAINTENANCE LETTER (OUTPATIENT)
Age: 32
End: 2022-11-21

## 2023-03-30 DIAGNOSIS — Z30.019 ENCOUNTER FOR INITIAL PRESCRIPTION OF CONTRACEPTIVES, UNSPECIFIED CONTRACEPTIVE: ICD-10-CM

## 2023-03-30 RX ORDER — ETHYNODIOL DIACETATE AND ETHINYL ESTRADIOL 1 MG-35MCG
KIT ORAL
Qty: 112 TABLET | Refills: 0 | Status: SHIPPED | OUTPATIENT
Start: 2023-03-30 | End: 2023-07-11

## 2023-05-24 ENCOUNTER — PATIENT OUTREACH (OUTPATIENT)
Dept: CARE COORDINATION | Facility: CLINIC | Age: 33
End: 2023-05-24
Payer: COMMERCIAL

## 2023-06-08 ENCOUNTER — PATIENT OUTREACH (OUTPATIENT)
Dept: CARE COORDINATION | Facility: CLINIC | Age: 33
End: 2023-06-08
Payer: COMMERCIAL

## 2023-07-10 DIAGNOSIS — Z30.019 ENCOUNTER FOR INITIAL PRESCRIPTION OF CONTRACEPTIVES, UNSPECIFIED CONTRACEPTIVE: ICD-10-CM

## 2023-07-11 RX ORDER — ETHYNODIOL DIACETATE AND ETHINYL ESTRADIOL 1 MG-35MCG
KIT ORAL
Qty: 112 TABLET | Refills: 0 | Status: SHIPPED | OUTPATIENT
Start: 2023-07-11 | End: 2023-09-13

## 2023-07-31 DIAGNOSIS — J45.40 MODERATE PERSISTENT ASTHMA WITHOUT COMPLICATION: ICD-10-CM

## 2023-08-02 NOTE — TELEPHONE ENCOUNTER
"Left a message for Ester to return our call. She is due for a Yearly Preventative visit. Thank you!    Lizette Velazquez, RN      Requested Prescriptions   Pending Prescriptions Disp Refills    albuterol (PROAIR HFA/PROVENTIL HFA/VENTOLIN HFA) 108 (90 Base) MCG/ACT inhaler [Pharmacy Med Name: albuterol sulfate HFA 90 mcg/actuation aerosol inhaler] 8.5 g 5     Sig: Inhale 2 puffs into the lungs every 6 hours as needed for shortness of breath / dyspnea or wheezing       Asthma Maintenance Inhalers - Anticholinergics Failed - 7/31/2023  7:38 AM        Failed - Asthma control assessment score within normal limits in last 6 months     Please review ACT score.           Failed - Recent (6 mo) or future (30 days) visit within the authorizing provider's specialty     Patient had office visit in the last 6 months or has a visit in the next 30 days with authorizing provider or within the authorizing provider's specialty.  See \"Patient Info\" tab in inbasket, or \"Choose Columns\" in Meds & Orders section of the refill encounter.            Passed - Patient is age 12 years or older        Passed - Medication is active on med list       Short-Acting Beta Agonist Inhalers Protocol  Failed - 7/31/2023  7:38 AM        Failed - Asthma control assessment score within normal limits in last 6 months     Please review ACT score.           Failed - Recent (6 mo) or future (30 days) visit within the authorizing provider's specialty     Patient had office visit in the last 6 months or has a visit in the next 30 days with authorizing provider or within the authorizing provider's specialty.  See \"Patient Info\" tab in inbasket, or \"Choose Columns\" in Meds & Orders section of the refill encounter.            Passed - Patient is age 12 or older        Passed - Medication is active on med list             "

## 2023-08-04 RX ORDER — ALBUTEROL SULFATE 90 UG/1
AEROSOL, METERED RESPIRATORY (INHALATION)
Qty: 8.5 G | Refills: 5 | Status: SHIPPED | OUTPATIENT
Start: 2023-08-04 | End: 2024-09-11

## 2023-09-13 ENCOUNTER — OFFICE VISIT (OUTPATIENT)
Dept: FAMILY MEDICINE | Facility: CLINIC | Age: 33
End: 2023-09-13
Payer: COMMERCIAL

## 2023-09-13 VITALS
HEART RATE: 80 BPM | BODY MASS INDEX: 24.92 KG/M2 | OXYGEN SATURATION: 97 % | TEMPERATURE: 97.6 F | DIASTOLIC BLOOD PRESSURE: 64 MMHG | HEIGHT: 64 IN | WEIGHT: 146 LBS | RESPIRATION RATE: 16 BRPM | SYSTOLIC BLOOD PRESSURE: 118 MMHG

## 2023-09-13 DIAGNOSIS — Z13.220 LIPID SCREENING: ICD-10-CM

## 2023-09-13 DIAGNOSIS — Z30.019 ENCOUNTER FOR INITIAL PRESCRIPTION OF CONTRACEPTIVES, UNSPECIFIED CONTRACEPTIVE: ICD-10-CM

## 2023-09-13 DIAGNOSIS — F33.0 MILD EPISODE OF RECURRENT MAJOR DEPRESSIVE DISORDER (H): ICD-10-CM

## 2023-09-13 DIAGNOSIS — Z00.00 ROUTINE GENERAL MEDICAL EXAMINATION AT A HEALTH CARE FACILITY: Primary | ICD-10-CM

## 2023-09-13 DIAGNOSIS — F41.1 GAD (GENERALIZED ANXIETY DISORDER): ICD-10-CM

## 2023-09-13 LAB
CHOLEST SERPL-MCNC: 235 MG/DL
FASTING STATUS PATIENT QL REPORTED: YES
GLUCOSE SERPL-MCNC: 86 MG/DL (ref 70–99)
HDLC SERPL-MCNC: 57 MG/DL
LDLC SERPL CALC-MCNC: 165 MG/DL
NONHDLC SERPL-MCNC: 178 MG/DL
TRIGL SERPL-MCNC: 66 MG/DL
TSH SERPL DL<=0.005 MIU/L-ACNC: 2.8 UIU/ML (ref 0.3–4.2)

## 2023-09-13 PROCEDURE — 90686 IIV4 VACC NO PRSV 0.5 ML IM: CPT | Performed by: NURSE PRACTITIONER

## 2023-09-13 PROCEDURE — 36415 COLL VENOUS BLD VENIPUNCTURE: CPT | Performed by: NURSE PRACTITIONER

## 2023-09-13 PROCEDURE — 99214 OFFICE O/P EST MOD 30 MIN: CPT | Mod: 25 | Performed by: NURSE PRACTITIONER

## 2023-09-13 PROCEDURE — 99395 PREV VISIT EST AGE 18-39: CPT | Mod: 25 | Performed by: NURSE PRACTITIONER

## 2023-09-13 PROCEDURE — 80061 LIPID PANEL: CPT | Performed by: NURSE PRACTITIONER

## 2023-09-13 PROCEDURE — 90472 IMMUNIZATION ADMIN EACH ADD: CPT | Performed by: NURSE PRACTITIONER

## 2023-09-13 PROCEDURE — 82947 ASSAY GLUCOSE BLOOD QUANT: CPT | Performed by: NURSE PRACTITIONER

## 2023-09-13 PROCEDURE — 84443 ASSAY THYROID STIM HORMONE: CPT | Performed by: NURSE PRACTITIONER

## 2023-09-13 PROCEDURE — 90677 PCV20 VACCINE IM: CPT | Performed by: NURSE PRACTITIONER

## 2023-09-13 PROCEDURE — 90471 IMMUNIZATION ADMIN: CPT | Performed by: NURSE PRACTITIONER

## 2023-09-13 RX ORDER — ETHYNODIOL DIACETATE AND ETHINYL ESTRADIOL 1 MG-35MCG
1 KIT ORAL DAILY
Qty: 112 TABLET | Refills: 3 | Status: SHIPPED | OUTPATIENT
Start: 2023-09-13 | End: 2024-08-26

## 2023-09-13 RX ORDER — HYDROXYZINE HYDROCHLORIDE 25 MG/1
25-50 TABLET, FILM COATED ORAL 3 TIMES DAILY PRN
Qty: 30 TABLET | Refills: 3 | Status: SHIPPED | OUTPATIENT
Start: 2023-09-13 | End: 2024-09-11

## 2023-09-13 ASSESSMENT — ENCOUNTER SYMPTOMS
NAUSEA: 0
MYALGIAS: 0
HEADACHES: 1
DIZZINESS: 0
DIARRHEA: 0
COUGH: 0
PALPITATIONS: 0
NERVOUS/ANXIOUS: 1
DYSURIA: 0
HEMATURIA: 0
WEAKNESS: 0
FEVER: 0
HEMATOCHEZIA: 0
SHORTNESS OF BREATH: 0
CHILLS: 0
PARESTHESIAS: 0
SORE THROAT: 0
BREAST MASS: 0
FREQUENCY: 0
EYE PAIN: 0
ARTHRALGIAS: 0
JOINT SWELLING: 0
ABDOMINAL PAIN: 0
CONSTIPATION: 0
HEARTBURN: 0

## 2023-09-13 ASSESSMENT — ASTHMA QUESTIONNAIRES
QUESTION_1 LAST FOUR WEEKS HOW MUCH OF THE TIME DID YOUR ASTHMA KEEP YOU FROM GETTING AS MUCH DONE AT WORK, SCHOOL OR AT HOME: NONE OF THE TIME
ACT_TOTALSCORE: 21
QUESTION_3 LAST FOUR WEEKS HOW OFTEN DID YOUR ASTHMA SYMPTOMS (WHEEZING, COUGHING, SHORTNESS OF BREATH, CHEST TIGHTNESS OR PAIN) WAKE YOU UP AT NIGHT OR EARLIER THAN USUAL IN THE MORNING: ONCE OR TWICE
QUESTION_2 LAST FOUR WEEKS HOW OFTEN HAVE YOU HAD SHORTNESS OF BREATH: ONCE OR TWICE A WEEK
QUESTION_4 LAST FOUR WEEKS HOW OFTEN HAVE YOU USED YOUR RESCUE INHALER OR NEBULIZER MEDICATION (SUCH AS ALBUTEROL): ONCE A WEEK OR LESS
ACT_TOTALSCORE: 21
QUESTION_5 LAST FOUR WEEKS HOW WOULD YOU RATE YOUR ASTHMA CONTROL: WELL CONTROLLED

## 2023-09-13 ASSESSMENT — PATIENT HEALTH QUESTIONNAIRE - PHQ9
5. POOR APPETITE OR OVEREATING: SEVERAL DAYS
10. IF YOU CHECKED OFF ANY PROBLEMS, HOW DIFFICULT HAVE THESE PROBLEMS MADE IT FOR YOU TO DO YOUR WORK, TAKE CARE OF THINGS AT HOME, OR GET ALONG WITH OTHER PEOPLE: SOMEWHAT DIFFICULT
SUM OF ALL RESPONSES TO PHQ QUESTIONS 1-9: 2
SUM OF ALL RESPONSES TO PHQ QUESTIONS 1-9: 2

## 2023-09-13 ASSESSMENT — ANXIETY QUESTIONNAIRES
1. FEELING NERVOUS, ANXIOUS, OR ON EDGE: SEVERAL DAYS
7. FEELING AFRAID AS IF SOMETHING AWFUL MIGHT HAPPEN: NOT AT ALL
GAD7 TOTAL SCORE: 4
3. WORRYING TOO MUCH ABOUT DIFFERENT THINGS: SEVERAL DAYS
5. BEING SO RESTLESS THAT IT IS HARD TO SIT STILL: NOT AT ALL
6. BECOMING EASILY ANNOYED OR IRRITABLE: NOT AT ALL
2. NOT BEING ABLE TO STOP OR CONTROL WORRYING: SEVERAL DAYS
GAD7 TOTAL SCORE: 4

## 2023-09-13 ASSESSMENT — PAIN SCALES - GENERAL: PAINLEVEL: MILD PAIN (3)

## 2023-09-13 NOTE — LETTER
My Asthma Action Plan    Name: Fatimah Roy   YOB: 1990  Date: 9/13/2023   My doctor: HECTOR Umaña CNP   My clinic: [facility]        My Rescue Medicine:   Albuterol inhaler (Proair/Ventolin/Proventil HFA)  2-4 puffs EVERY 4 HOURS as needed. Use a spacer if recommended by your provider.   My Asthma Severity:   Intermittent / Exercise Induced  Know your asthma triggers:  .  patient had a baby in March, has had issues with asthma ever since.          GREEN ZONE   Good Control  I feel good  No cough or wheeze  Can work, sleep and play without asthma symptoms       Take your asthma control medicine every day.     If exercise triggers your asthma, take your rescue medication  15 minutes before exercise or sports, and  During exercise if you have asthma symptoms  Spacer to use with inhaler: If you have a spacer, make sure to use it with your inhaler             YELLOW ZONE Getting Worse  I have ANY of these:  I do not feel good  Cough or wheeze  Chest feels tight  Wake up at night   Keep taking your Green Zone medications  Start taking your rescue medicine:  every 20 minutes for up to 1 hour. Then every 4 hours for 24-48 hours.  If you stay in the Yellow Zone for more than 12-24 hours, contact your doctor.  If you do not return to the Green Zone in 12-24 hours or you get worse, start taking your oral steroid medicine if prescribed by your provider.           RED ZONE Medical Alert - Get Help  I have ANY of these:  I feel awful  Medicine is not helping  Breathing getting harder  Trouble walking or talking  Nose opens wide to breathe       Take your rescue medicine NOW  If your provider has prescribed an oral steroid medicine, start taking it NOW  Call your doctor NOW  If you are still in the Red Zone after 20 minutes and you have not reached your doctor:  Take your rescue medicine again and  Call 911 or go to the emergency room right away    See your regular doctor  within 2 weeks of an Emergency Room or Urgent Care visit for follow-up treatment.          Annual Reminders:  Meet with Asthma Educator,  Flu Shot in the Fall, consider Pneumonia Vaccination for patients with asthma (aged 19 and older).    Pharmacy:    [id number] - [place], [place] - [address] [facility] - [place], [place] - [address] [facility] [address] [facility]    Electronically signed by HECTOR Umaña CNP   Date: 09/13/23                    Asthma Triggers  How To Control Things That Make Your Asthma Worse    Triggers are things that make your asthma worse.  Look at the list below to help you find your triggers and   what you can do about them. You can help prevent asthma flare-ups by staying away from your triggers.      Trigger                                                          What you can do   Cigarette Smoke  Tobacco smoke can make asthma worse. Do not allow smoking in your home, car or around you.  Be sure no one smokes at a child s day care or school.  If you smoke, ask your health care provider for ways to help you quit.  Ask family members to quit too.  Ask your health care provider for a referral to Quit Plan to help you quit smoking, or call 4-190-651-PLAN.     Colds, Flu, Bronchitis  These are common triggers of asthma. Wash your hands often.  Don t touch your eyes, nose or mouth.  Get a flu shot every year.     Dust Mites  These are tiny bugs that live in cloth or carpet. They are too small to see. Wash sheets and blankets in hot water every week.   Encase pillows and mattress in dust mite proof covers.  Avoid having carpet if you can. If you have carpet, vacuum weekly.   Use a dust mask and HEPA vacuum.   Pollen and Outdoor Mold  Some people are allergic to trees, grass, or weed pollen, or molds. Try to keep your windows closed.  Limit time out doors when pollen count is high.   Ask you health care provider about taking medicine during allergy season.     Animal  Dander  Some people are allergic to skin flakes, urine or saliva from pets with fur or feathers. Keep pets with fur or feathers out of your home.    If you can t keep the pet outdoors, then keep the pet out of your bedroom.  Keep the bedroom door closed.  Keep pets off cloth furniture and away from stuffed toys.     Mice, Rats, and Cockroaches  Some people are allergic to the waste from these pests.   Cover food and garbage.  Clean up spills and food crumbs.  Store grease in the refrigerator.   Keep food out of the bedroom.   Indoor Mold  This can be a trigger if your home has high moisture. Fix leaking faucets, pipes, or other sources of water.   Clean moldy surfaces.  Dehumidify basement if it is damp and smelly.   Smoke, Strong Odors, and Sprays  These can reduce air quality. Stay away from strong odors and sprays, such as perfume, powder, hair spray, paints, smoke incense, paint, cleaning products, candles and new carpet.   Exercise or Sports  Some people with asthma have this trigger. Be active!  Ask your doctor about taking medicine before sports or exercise to prevent symptoms.    Warm up for 5-10 minutes before and after sports or exercise.     Other Triggers of Asthma  Cold air:  Cover your nose and mouth with a scarf.  Sometimes laughing or crying can be a trigger.  Some medicines and food can trigger asthma.      Opt out

## 2023-09-13 NOTE — PROGRESS NOTES
SUBJECTIVE:   CC: Ester is an 33 year old who presents for preventive health visit.       9/13/2023     2:45 PM   Additional Questions   Roomed by Ester       Healthy Habits:     Getting at least 3 servings of Calcium per day:  Yes    Bi-annual eye exam:  Yes    Dental care twice a year:  Yes    Sleep apnea or symptoms of sleep apnea:  None    Diet:  Regular (no restrictions)    Frequency of exercise:  4-5 days/week    Duration of exercise:  30-45 minutes    Taking medications regularly:  Yes    Medication side effects:  None    Additional concerns today:  Yes    Musculoskeletal problem/pain    Duration: 2 months - toe ongoing for a long time   Description  Location: right foot   Intensity:  moderate  Accompanying signs and symptoms: outside of foot pain, joint of great toe  History  Previous similar problem: no   Previous evaluation:  none  Precipitating or alleviating factors:  Trauma or overuse: YES- ankle sprain in May  Aggravating factors include: walking  Therapies tried and outcome: ice and support wrap       Contraception Management - refill of pill. No issues with this.   No personal or family history of stroke or breast cancer.  Does not use tobacco.  Mom with history of blood clot, no known clotting disorder  Does get migraines but no aura     Today's PHQ-9 Score:       9/13/2023    12:59 PM   PHQ-9 SCORE   PHQ-9 Total Score MyChart 2 (Minimal depression)   PHQ-9 Total Score 2     Depression and Anxiety Follow-Up  How are you doing with your depression since your last visit? Good   How are you doing with your anxiety since your last visit?  Working on this. Is a teacher   Are you having other symptoms that might be associated with depression or anxiety? Yes:  waking in the middle of the night but able to back to sleep   Have you had a significant life event? Going back to work ()    Do you have any concerns with your use of alcohol or other drugs? No    Social History     Tobacco Use     Smoking status: Never    Smokeless tobacco: Never   Vaping Use    Vaping Use: Never used   Substance Use Topics    Alcohol use: Not Currently     Comment: rare- quit with pregnancy    Drug use: No         2021    10:18 AM 2022     5:14 PM 2023    12:59 PM   PHQ   PHQ-9 Total Score 6 0 2   Q9: Thoughts of better off dead/self-harm past 2 weeks Not at all Not at all Not at all         5/10/2019    10:36 AM 3/12/2021    12:42 PM 2021    10:19 AM   PATRIC-7 SCORE   Total Score  18 (severe anxiety) 7 (mild anxiety)   Total Score 0 18 7       Social History     Tobacco Use    Smoking status: Never    Smokeless tobacco: Never   Substance Use Topics    Alcohol use: Not Currently     Comment: rare- quit with pregnancy         2023     1:04 PM   Alcohol Use   Prescreen: >3 drinks/day or >7 drinks/week? No     Reviewed orders with patient.  Reviewed health maintenance and updated orders accordingly - Yes  Labs reviewed in EPIC    Breast Cancer Screenin/23/2022    10:43 AM   Breast CA Risk Assessment (FHS-7)   Do you have a family history of breast, colon, or ovarian cancer? No / Unknown     Pertinent mammograms are reviewed under the imaging tab.    History of abnormal Pap smear: NO - age 30-65 PAP every 5 years with negative HPV co-testing recommended      Latest Ref Rng & Units 2022    11:04 AM 5/10/2019    10:46 AM 2016    12:00 AM   PAP / HPV   PAP  Negative for Intraepithelial Lesion or Malignancy (NILM)      PAP (Historical)   NIL  NIL    HPV 16 DNA Negative Negative      HPV 18 DNA Negative Negative      Other HR HPV Negative Negative        Reviewed and updated as needed this visit by clinical staff   Tobacco  Allergies  Meds              Reviewed and updated as needed this visit by Provider                     Review of Systems   Constitutional:  Negative for chills and fever.   HENT:  Positive for ear pain. Negative for congestion, hearing loss and sore throat.    Eyes:   "Negative for pain and visual disturbance.   Respiratory:  Negative for cough and shortness of breath.    Cardiovascular:  Negative for chest pain, palpitations and peripheral edema.   Gastrointestinal:  Negative for abdominal pain, constipation, diarrhea, heartburn, hematochezia and nausea.   Breasts:  Negative for tenderness, breast mass and discharge.   Genitourinary:  Negative for dysuria, frequency, genital sores, hematuria, pelvic pain, urgency, vaginal bleeding and vaginal discharge.   Musculoskeletal:  Negative for arthralgias, joint swelling and myalgias.   Skin:  Negative for rash.   Neurological:  Positive for headaches. Negative for dizziness, weakness and paresthesias.   Psychiatric/Behavioral:  Negative for mood changes. The patient is nervous/anxious.       OBJECTIVE:   /64 (Cuff Size: Adult Regular)   Pulse 80   Temp 97.6  F (36.4  C) (Tympanic)   Resp 16   Ht 1.632 m (5' 4.25\")   Wt 66.2 kg (146 lb)   LMP 07/15/2023 (Approximate)   SpO2 97%   BMI 24.87 kg/m    Physical Exam  GENERAL: healthy, alert and no distress  EYES: Eyes grossly normal to inspection, PERRL and conjunctivae and sclerae normal  HENT: ear canals and TM's normal, nose and mouth without ulcers or lesions  NECK: no adenopathy, no asymmetry, masses, or scars and thyroid normal to palpation  RESP: lungs clear to auscultation - no rales, rhonchi or wheezes  CV: regular rate and rhythm, normal S1 S2, no S3 or S4, no murmur, click or rub, no peripheral edema and peripheral pulses strong  ABDOMEN: soft, nontender, no hepatosplenomegaly, no masses and bowel sounds normal  MS: tenderness to palpation base right great toe  SKIN: no suspicious lesions or rashes  NEURO: Normal strength and tone, mentation intact and speech normal  PSYCH: mentation appears normal, affect normal/bright    Diagnostic Test Results:  Results for orders placed or performed in visit on 09/13/23   TSH with free T4 reflex     Status: Normal   Result Value " Ref Range    TSH 2.80 0.30 - 4.20 uIU/mL   Glucose     Status: None   Result Value Ref Range    Glucose 86 70 - 99 mg/dL    Patient Fasting > 8hrs? Yes    Lipid panel reflex to direct LDL Fasting     Status: Abnormal   Result Value Ref Range    Cholesterol 235 (H) <200 mg/dL    Triglycerides 66 <150 mg/dL    Direct Measure HDL 57 >=50 mg/dL    LDL Cholesterol Calculated 165 (H) <=100 mg/dL    Non HDL Cholesterol 178 (H) <130 mg/dL    Narrative    Cholesterol  Desirable:  <200 mg/dL    Triglycerides  Normal:  Less than 150 mg/dL  Borderline High:  150-199 mg/dL  High:  200-499 mg/dL  Very High:  Greater than or equal to 500 mg/dL    Direct Measure HDL  Female:  Greater than or equal to 50 mg/dL   Male:  Greater than or equal to 40 mg/dL    LDL Cholesterol  Desirable:  <100mg/dL  Above Desirable:  100-129 mg/dL   Borderline High:  130-159 mg/dL   High:  160-189 mg/dL   Very High:  >= 190 mg/dL    Non HDL Cholesterol  Desirable:  130 mg/dL  Above Desirable:  130-159 mg/dL  Borderline High:  160-189 mg/dL  High:  190-219 mg/dL  Very High:  Greater than or equal to 220 mg/dL       ASSESSMENT/PLAN:   (Z00.00) Routine general medical examination at a health care facility  (primary encounter diagnosis)  Comment: Plan to monitor toe symptoms, can consider podiatry if symptoms persist  Plan: Glucose, TSH with free T4 reflex          (Z30.019) Encounter for initial prescription of contraceptives, unspecified contraceptive  Comment: Happy with current contraceptive.   Plan: ethynodiol-ethinyl estradiol (KELNOR) 1-35         MG-MCG tablet          (F33.0) Mild episode of recurrent major depressive disorder (H)  Comment: stable with current medication  Plan: sertraline (ZOLOFT) 50 MG tablet          (F41.1) PATRIC (generalized anxiety disorder)  Comment: stable.   Plan: hydrOXYzine (ATARAX) 25 MG tablet, sertraline         (ZOLOFT) 50 MG tablet          (Z13.220) Lipid screening  Comment:   Plan: Lipid panel reflex to direct LDL  Fasting             COUNSELING:  Reviewed preventive health counseling, as reflected in patient instructions         She reports that she has never smoked. She has never used smokeless tobacco.          HECTOR Umaña [facility]    Answers submitted by the patient for this visit:  Patient Health Questionnaire (Submitted on 9/13/2023)  If you checked off any problems, how difficult have these problems made it for you to do your work, take care of things at home, or get along with other people?: Somewhat difficult  PHQ9 TOTAL SCORE: 2

## 2024-07-31 DIAGNOSIS — Z30.019 ENCOUNTER FOR INITIAL PRESCRIPTION OF CONTRACEPTIVES, UNSPECIFIED CONTRACEPTIVE: ICD-10-CM

## 2024-08-01 RX ORDER — ETHYNODIOL DIACETATE AND ETHINYL ESTRADIOL TABLETS 1 MG-35MCG
KIT ORAL
Qty: 112 TABLET | Refills: 3 | OUTPATIENT
Start: 2024-08-01

## 2024-08-01 NOTE — TELEPHONE ENCOUNTER
Pending Prescriptions:                       Disp   Refills    ZOVIA 1/35 (28) 1-35 MG-MCG tablet [Pharm*112 ta*3            Sig: Take 1 tablet by mouth daily In continuous           regimen    Routing refill request to provider for review/approval because:   Medication indicated for associated diagnosis         Ag Davidson RN

## 2024-08-14 ENCOUNTER — PATIENT OUTREACH (OUTPATIENT)
Dept: CARE COORDINATION | Facility: CLINIC | Age: 34
End: 2024-08-14
Payer: COMMERCIAL

## 2024-08-26 DIAGNOSIS — Z30.019 ENCOUNTER FOR INITIAL PRESCRIPTION OF CONTRACEPTIVES, UNSPECIFIED CONTRACEPTIVE: ICD-10-CM

## 2024-08-26 RX ORDER — ETHYNODIOL DIACETATE AND ETHINYL ESTRADIOL TABLETS 1 MG-35MCG
KIT ORAL
Qty: 112 TABLET | Refills: 0 | Status: SHIPPED | OUTPATIENT
Start: 2024-08-26 | End: 2024-09-11

## 2024-09-10 ASSESSMENT — ASTHMA QUESTIONNAIRES
ACT_TOTALSCORE: 20
ACT_TOTALSCORE: 20
QUESTION_3 LAST FOUR WEEKS HOW OFTEN DID YOUR ASTHMA SYMPTOMS (WHEEZING, COUGHING, SHORTNESS OF BREATH, CHEST TIGHTNESS OR PAIN) WAKE YOU UP AT NIGHT OR EARLIER THAN USUAL IN THE MORNING: ONCE OR TWICE
QUESTION_2 LAST FOUR WEEKS HOW OFTEN HAVE YOU HAD SHORTNESS OF BREATH: ONCE OR TWICE A WEEK
QUESTION_4 LAST FOUR WEEKS HOW OFTEN HAVE YOU USED YOUR RESCUE INHALER OR NEBULIZER MEDICATION (SUCH AS ALBUTEROL): ONCE A WEEK OR LESS
QUESTION_5 LAST FOUR WEEKS HOW WOULD YOU RATE YOUR ASTHMA CONTROL: WELL CONTROLLED
QUESTION_1 LAST FOUR WEEKS HOW MUCH OF THE TIME DID YOUR ASTHMA KEEP YOU FROM GETTING AS MUCH DONE AT WORK, SCHOOL OR AT HOME: A LITTLE OF THE TIME

## 2024-09-10 ASSESSMENT — PATIENT HEALTH QUESTIONNAIRE - PHQ9
SUM OF ALL RESPONSES TO PHQ QUESTIONS 1-9: 0
SUM OF ALL RESPONSES TO PHQ QUESTIONS 1-9: 0

## 2024-09-11 ENCOUNTER — OFFICE VISIT (OUTPATIENT)
Dept: FAMILY MEDICINE | Facility: CLINIC | Age: 34
End: 2024-09-11
Payer: COMMERCIAL

## 2024-09-11 VITALS
HEIGHT: 64 IN | DIASTOLIC BLOOD PRESSURE: 62 MMHG | HEART RATE: 80 BPM | RESPIRATION RATE: 16 BRPM | OXYGEN SATURATION: 98 % | SYSTOLIC BLOOD PRESSURE: 104 MMHG | BODY MASS INDEX: 24.59 KG/M2 | TEMPERATURE: 98.5 F | WEIGHT: 144 LBS

## 2024-09-11 DIAGNOSIS — E78.00 ELEVATED LDL CHOLESTEROL LEVEL: ICD-10-CM

## 2024-09-11 DIAGNOSIS — F33.0 MILD EPISODE OF RECURRENT MAJOR DEPRESSIVE DISORDER (H): ICD-10-CM

## 2024-09-11 DIAGNOSIS — Z30.019 ENCOUNTER FOR INITIAL PRESCRIPTION OF CONTRACEPTIVES, UNSPECIFIED CONTRACEPTIVE: ICD-10-CM

## 2024-09-11 DIAGNOSIS — J45.40 MODERATE PERSISTENT ASTHMA WITHOUT COMPLICATION: ICD-10-CM

## 2024-09-11 DIAGNOSIS — Z00.00 ROUTINE GENERAL MEDICAL EXAMINATION AT A HEALTH CARE FACILITY: Primary | ICD-10-CM

## 2024-09-11 DIAGNOSIS — G43.009 MIGRAINE WITHOUT AURA AND WITHOUT STATUS MIGRAINOSUS, NOT INTRACTABLE: ICD-10-CM

## 2024-09-11 DIAGNOSIS — F41.1 GAD (GENERALIZED ANXIETY DISORDER): ICD-10-CM

## 2024-09-11 PROCEDURE — 90471 IMMUNIZATION ADMIN: CPT | Performed by: FAMILY MEDICINE

## 2024-09-11 PROCEDURE — 99395 PREV VISIT EST AGE 18-39: CPT | Mod: 25 | Performed by: FAMILY MEDICINE

## 2024-09-11 PROCEDURE — 99214 OFFICE O/P EST MOD 30 MIN: CPT | Mod: 25 | Performed by: FAMILY MEDICINE

## 2024-09-11 PROCEDURE — 90656 IIV3 VACC NO PRSV 0.5 ML IM: CPT | Performed by: FAMILY MEDICINE

## 2024-09-11 RX ORDER — ETHYNODIOL DIACETATE AND ETHINYL ESTRADIOL 1 MG-35MCG
1 KIT ORAL DAILY
Qty: 112 TABLET | Refills: 3 | Status: SHIPPED | OUTPATIENT
Start: 2024-09-11 | End: 2025-01-01

## 2024-09-11 RX ORDER — ALBUTEROL SULFATE 90 UG/1
AEROSOL, METERED RESPIRATORY (INHALATION)
Qty: 8.5 G | Refills: 5 | Status: SHIPPED | OUTPATIENT
Start: 2024-09-11

## 2024-09-11 RX ORDER — HYDROXYZINE HYDROCHLORIDE 25 MG/1
25-50 TABLET, FILM COATED ORAL 3 TIMES DAILY PRN
Qty: 30 TABLET | Refills: 3 | Status: SHIPPED | OUTPATIENT
Start: 2024-09-11

## 2024-09-11 RX ORDER — ONDANSETRON 4 MG/1
4 TABLET, ORALLY DISINTEGRATING ORAL EVERY 8 HOURS PRN
Qty: 15 TABLET | Refills: 0 | Status: SHIPPED | OUTPATIENT
Start: 2024-09-11

## 2024-09-11 RX ORDER — SUMATRIPTAN 25 MG/1
25 TABLET, FILM COATED ORAL
Qty: 15 TABLET | Refills: 0 | Status: SHIPPED | OUTPATIENT
Start: 2024-09-11

## 2024-09-11 ASSESSMENT — PAIN SCALES - GENERAL: PAINLEVEL: NO PAIN (0)

## 2024-09-11 NOTE — NURSING NOTE
"Chief Complaint   Patient presents with    Physical       Initial /62   Pulse 80   Temp 98.5  F (36.9  C) (Tympanic)   Resp 16   Ht 1.63 m (5' 4.17\")   Wt 65.3 kg (144 lb)   LMP 06/15/2024 (Approximate)   SpO2 98%   Breastfeeding No   BMI 24.58 kg/m   Estimated body mass index is 24.58 kg/m  as calculated from the following:    Height as of this encounter: 1.63 m (5' 4.17\").    Weight as of this encounter: 65.3 kg (144 lb).    Patient presents to the clinic using No DME    Is there anyone who you would like to be able to receive your results? No  If yes have patient fill out ARI      "

## 2024-09-11 NOTE — PROGRESS NOTES
Preventive Care Visit  [facility]  Summer Francois MD, Family Medicine  Sep 11, 2024      Assessment & Plan     Routine general medical examination at a health care facility    PATRIC (generalized anxiety disorder)  stable  - sertraline (ZOLOFT) 50 MG tablet; Take 1 tablet (50 mg) by mouth daily.  - hydrOXYzine HCl (ATARAX) 25 MG tablet; Take 1-2 tablets (25-50 mg) by mouth 3 times daily as needed for anxiety (or at bedtime).    Mild episode of recurrent major depressive disorder (H24)  stable  - sertraline (ZOLOFT) 50 MG tablet; Take 1 tablet (50 mg) by mouth daily.    Encounter for initial prescription of contraceptives, unspecified contraceptive  Stable, no aura with her migraines  Discussed potentials risks  - ethynodiol-ethinyl estradiol (ZOVIA 1/35, 28,) 1-35 MG-MCG tablet; Take 1 tablet by mouth daily.    Moderate persistent asthma without complication  stable  - albuterol (PROAIR HFA/PROVENTIL HFA/VENTOLIN HFA) 108 (90 Base) MCG/ACT inhaler; Inhale 2 puffs into the lungs every 6 hours as needed for shortness of breath / dyspnea or wheezing    Migraine without aura and without status migrainosus, not intractable  Not well controlled  Start with as needed medication  If no improvement return for further management  - SUMAtriptan (IMITREX) 25 MG tablet; Take 1 tablet (25 mg) by mouth at onset of headache for migraine. May repeat in 2 hours. Max 8 tablets/24 hours.  - ondansetron (ZOFRAN ODT) 4 MG ODT tab; Take 1 tablet (4 mg) by mouth every 8 hours as needed for nausea.    Elevated LDL cholesterol level  stable  - Lipid panel reflex to direct LDL Non-fasting; Future            Counseling  Appropriate preventive services were addressed with this patient via screening, questionnaire, or discussion as appropriate for fall prevention, nutrition, physical activity, Tobacco-use cessation, social engagement, weight loss and cognition.  Checklist reviewing preventive services available has been  given to the patient.  Reviewed patient's diet, addressing concerns and/or questions.   She is at risk for lack of exercise and has been provided with information to increase physical activity for the benefit of her well-being.           Oliver Norman is a 34 year old, presenting for the following:  Physical        9/13/2023     2:45 PM   Additional Questions   Roomed by Ester        Health Care Directive  Patient does not have a Health Care Directive or Living Will: Discussed advance care planning with patient; information given to patient to review.    HPI  Migraines more frequently  Debilitating  Nausea and vomiting  Right behind eyes - very painful  Needs to lay down  Having them for a year  No prior treatment  Average is once a week                9/10/2024   General Health   How would you rate your overall physical health? Good   Feel stress (tense, anxious, or unable to sleep) To some extent      (!) STRESS CONCERN      9/10/2024   Nutrition   Three or more servings of calcium each day? Yes   Diet: Regular (no restrictions)   How many servings of fruit and vegetables per day? (!) 2-3   How many sweetened beverages each day? 0-1            9/10/2024   Exercise   Days per week of moderate/strenous exercise 3 days   Average minutes spent exercising at this level 20 min            9/10/2024   Social Factors   Frequency of gathering with friends or relatives Once a week   Worry food won't last until get money to buy more No   Food not last or not have enough money for food? No   Do you have housing? (Housing is defined as stable permanent housing and does not include staying ouside in a car, in a tent, in an abandoned building, in an overnight shelter, or couch-surfing.) Yes   Are you worried about losing your housing? No   Lack of transportation? No   Unable to get utilities (heat,electricity)? No            9/10/2024   Dental   Dentist two times every year? Yes            9/10/2024   TB Screening   Were you  born outside of the [place]? No          Today's PHQ-9 Score:       9/10/2024     5:34 PM   PHQ-9 SCORE   PHQ-9 Total Score MyChart 0   PHQ-9 Total Score 0         9/10/2024   Substance Use   Alcohol more than 3/day or more than 7/wk No   Do you use any other substances recreationally? No        Social History     Tobacco Use    Smoking status: Never    Smokeless tobacco: Never   Vaping Use    Vaping status: Never Used   Substance Use Topics    Alcohol use: Not Currently     Comment: rare- quit with pregnancy    Drug use: No                  9/10/2024   STI Screening   New sexual partner(s) since last STI/HIV test? No        History of abnormal Pap smear: No - age 30-64 HPV with reflex Pap every 5 years recommended        Latest Ref Rng & Units 2022    11:04 AM 5/10/2019    10:46 AM 2016    12:00 AM   PAP / HPV   PAP  Negative for Intraepithelial Lesion or Malignancy (NILM)      PAP (Historical)   NIL  NIL    HPV 16 DNA Negative Negative      HPV 18 DNA Negative Negative      Other HR HPV Negative Negative              9/10/2024   Contraception/Family Planning   Questions about contraception or family planning No           Reviewed and updated as needed this visit by Provider                    Past Medical History:   Diagnosis Date    Chickenpox     Depressive disorder     Uncomplicated asthma      Past Surgical History:   Procedure Laterality Date     SECTION N/A 3/24/2017    Procedure:  SECTION;  Surgeon: Renetta Saeed MD;  Location: [facility]     SECTION N/A 3/29/2019    Procedure:  SECTION, Repeat;  Surgeon: Renetta Saeed MD;  Location: [facility]    ESOPHAGOSCOPY, GASTROSCOPY, DUODENOSCOPY (EGD), COMBINED  2012    Procedure: COMBINED ESOPHAGOSCOPY, GASTROSCOPY, DUODENOSCOPY (EGD);  Gastroscopy;  Surgeon: Pete Arciniega MD;  Location: [facility]    MOUTH SURGERY      wisdom teeth     OB History    Para Term  AB Living   2 2 2 0 0 2   SAB IAB Ectopic  "Multiple Live Births   0 0 0 0 2      # Outcome Date GA Lbr Alon/2nd Weight Sex Type Anes PTL Lv   2 Term 19 39w1d  3.289 kg (7 lb 4 oz) F CS-LTranv Spinal  BRANNON      Birth Comments: PNP in attendance at delivery for repeat .  Infant delivered and brought to warmer for drying/stimulation.  Apgars 9 and 9, no interventions needed.      Name: Noemi      Apgar1: 9  Apgar5: 9   1 Term 17 39w0d  3.317 kg (7 lb 5 oz) M CS-LTranv Spinal  BRANNON      Birth Comments: breech      Name: Tobin      Apgar1: 9  Apgar5: 9            Objective    Exam  /62   Pulse 80   Temp 98.5  F (36.9  C) (Tympanic)   Resp 16   Ht 1.63 m (5' 4.17\")   Wt 65.3 kg (144 lb)   LMP 06/15/2024 (Approximate)   SpO2 98%   Breastfeeding No   BMI 24.58 kg/m     Estimated body mass index is 24.58 kg/m  as calculated from the following:    Height as of this encounter: 1.63 m (5' 4.17\").    Weight as of this encounter: 65.3 kg (144 lb).    Physical Exam  GENERAL: alert and no distress  NECK: no adenopathy, no asymmetry, masses, or scars  RESP: lungs clear to auscultation - no rales, rhonchi or wheezes  CV: regular rate and rhythm, normal S1 S2, no S3 or S4, no murmur, click or rub, no peripheral edema  ABDOMEN: soft, nontender, no hepatosplenomegaly, no masses and bowel sounds normal  MS: no gross musculoskeletal defects noted, no edema        Signed Electronically by: Summer Francois MD    Answers submitted by the patient for this visit:  Patient Health Questionnaire (Submitted on 9/10/2024)  PHQ9 TOTAL SCORE: 0    "

## 2024-09-11 NOTE — PATIENT INSTRUCTIONS
Patient Education   Preventive Care Advice   This is general advice given by our system to help you stay healthy. However, your care team may have specific advice just for you. Please talk to your care team about your preventive care needs.  Nutrition  Eat 5 or more servings of fruits and vegetables each day.  Try wheat bread, brown rice and whole grain pasta (instead of white bread, rice, and pasta).  Get enough calcium and vitamin D. Check the label on foods and aim for 100% of the RDA (recommended daily allowance).  Lifestyle  Exercise at least 150 minutes each week  (30 minutes a day, 5 days a week).  Do muscle strengthening activities 2 days a week. These help control your weight and prevent disease.  No smoking.  Wear sunscreen to prevent skin cancer.  Have a dental exam and cleaning every 6 months.  Yearly exams  See your health care team every year to talk about:  Any changes in your health.  Any medicines your care team has prescribed.  Preventive care, family planning, and ways to prevent chronic diseases.  Shots (vaccines)   HPV shots (up to age 26), if you've never had them before.  Hepatitis B shots (up to age 59), if you've never had them before.  COVID-19 shot: Get this shot when it's due.  Flu shot: Get a flu shot every year.  Tetanus shot: Get a tetanus shot every 10 years.  Pneumococcal, hepatitis A, and RSV shots: Ask your care team if you need these based on your risk.  Shingles shot (for age 50 and up)  General health tests  Diabetes screening:  Starting at age 35, Get screened for diabetes at least every 3 years.  If you are younger than age 35, ask your care team if you should be screened for diabetes.  Cholesterol test: At age 39, start having a cholesterol test every 5 years, or more often if advised.  Bone density scan (DEXA): At age 50, ask your care team if you should have this scan for osteoporosis (brittle bones).  Hepatitis C: Get tested at least once in your life.  STIs (sexually  transmitted infections)  Before age 24: Ask your care team if you should be screened for STIs.  After age 24: Get screened for STIs if you're at risk. You are at risk for STIs (including HIV) if:  You are sexually active with more than one person.  You don't use condoms every time.  You or a partner was diagnosed with a sexually transmitted infection.  If you are at risk for HIV, ask about PrEP medicine to prevent HIV.  Get tested for HIV at least once in your life, whether you are at risk for HIV or not.  Cancer screening tests  Cervical cancer screening: If you have a cervix, begin getting regular cervical cancer screening tests starting at age 21.  Breast cancer scan (mammogram): If you've ever had breasts, begin having regular mammograms starting at age 40. This is a scan to check for breast cancer.  Colon cancer screening: It is important to start screening for colon cancer at age 45.  Have a colonoscopy test every 10 years (or more often if you're at risk) Or, ask your provider about stool tests like a FIT test every year or Cologuard test every 3 years.  To learn more about your testing options, visit:   .  For help making a decision, visit:   https://bit.ly/ua73792.  Prostate cancer screening test: If you have a prostate, ask your care team if a prostate cancer screening test (PSA) at age 55 is right for you.  Lung cancer screening: If you are a current or former smoker ages 50 to 80, ask your care team if ongoing lung cancer screenings are right for you.  For informational purposes only. Not to replace the advice of your health care provider. Copyright   2023 [employer]. All rights reserved. Clinically reviewed by the [facility] Transitions Program. [employer] [id number] - REV 01/24.

## 2025-03-24 DIAGNOSIS — G43.009 MIGRAINE WITHOUT AURA AND WITHOUT STATUS MIGRAINOSUS, NOT INTRACTABLE: ICD-10-CM

## 2025-03-24 RX ORDER — SUMATRIPTAN SUCCINATE 25 MG/1
25 TABLET ORAL
Qty: 15 TABLET | Refills: 0 | Status: SHIPPED | OUTPATIENT
Start: 2025-03-24

## 2025-05-02 DIAGNOSIS — G43.009 MIGRAINE WITHOUT AURA AND WITHOUT STATUS MIGRAINOSUS, NOT INTRACTABLE: ICD-10-CM

## 2025-05-02 NOTE — TELEPHONE ENCOUNTER
Requested Prescriptions   Pending Prescriptions Disp Refills    SUMAtriptan (IMITREX) 25 MG tablet [Pharmacy Med Name: sumatriptan 25 mg tablet] 15 tablet 0     Sig: Take 1 tablet (25 mg) by mouth at onset of headache for migraine. May repeat in 2 hours. Max 8 tablets/24 hours.       Serotonin Agonists Failed - 5/2/2025  3:23 PM        Failed - Review patient's medical record. If the patient has used less than or equal to nine (9) tablets or injections for migraine a month the RN may authorize the refill request.        Passed - Most recent blood pressure under 140/90 in past 12 months     BP Readings from Last 3 Encounters:   09/11/24 104/62   09/13/23 118/64   06/23/22 122/80       No data recorded            Passed - Medication is active on med list and the sig matches. RN to manually verify dose and sig if red X/fail.     If the protocol passes (green check), you do not need to verify med dose and sig.    A prescription matches if they are the same clinical intention.    For Example: once daily and every morning are the same.    The protocol can not identify upper and lower case letters as matching and will fail.     For Example: Take 1 tablet (50 mg) by mouth daily     TAKE 1 TABLET (50 MG) BY MOUTH DAILY    For all fails (red x), verify dose and sig.    If the refill does match what is on file, the RN can still proceed to approve the refill request.       If they do not match, route to the appropriate provider.             Passed - Recent (12 mo) or future (90 days) visit within the authorizing provider's specialty     The patient must have completed an in-person or virtual visit within the past 12 months or has a future visit scheduled within the next 90 days with the authorizing provider s specialty.  Urgent care and e-visits do not qualify as an office visit for this protocol.          Passed - Medication indicated for associated diagnosis     Medication is associated with one or more of the following  diagnoses:     Cluster headache   Headache   Migraine          Passed - Patient is age 18 or older        Passed - No active pregnancy on record        Passed - No positive pregnancy test in past 12 months           Last office visit: 9/11/2024 ; last virtual visit: Visit date not found with prescribing provider:     Future Office Visit:      Julie Behrendt RN

## 2025-05-05 RX ORDER — SUMATRIPTAN SUCCINATE 25 MG/1
25 TABLET ORAL
Qty: 9 TABLET | Refills: 0 | Status: SHIPPED | OUTPATIENT
Start: 2025-05-05

## 2025-08-15 ENCOUNTER — OFFICE VISIT (OUTPATIENT)
Dept: FAMILY MEDICINE | Facility: CLINIC | Age: 35
End: 2025-08-15
Payer: COMMERCIAL

## 2025-08-15 VITALS
RESPIRATION RATE: 12 BRPM | SYSTOLIC BLOOD PRESSURE: 104 MMHG | BODY MASS INDEX: 25.22 KG/M2 | HEART RATE: 77 BPM | OXYGEN SATURATION: 99 % | TEMPERATURE: 99.2 F | DIASTOLIC BLOOD PRESSURE: 68 MMHG | WEIGHT: 151.4 LBS | HEIGHT: 65 IN

## 2025-08-15 DIAGNOSIS — F41.1 GAD (GENERALIZED ANXIETY DISORDER): ICD-10-CM

## 2025-08-15 DIAGNOSIS — F33.0 MILD EPISODE OF RECURRENT MAJOR DEPRESSIVE DISORDER: ICD-10-CM

## 2025-08-15 DIAGNOSIS — Z00.00 ROUTINE GENERAL MEDICAL EXAMINATION AT A HEALTH CARE FACILITY: Primary | ICD-10-CM

## 2025-08-15 DIAGNOSIS — E78.00 ELEVATED LDL CHOLESTEROL LEVEL: ICD-10-CM

## 2025-08-15 DIAGNOSIS — Z30.41 ENCOUNTER FOR SURVEILLANCE OF CONTRACEPTIVE PILLS: ICD-10-CM

## 2025-08-15 DIAGNOSIS — G43.009 MIGRAINE WITHOUT AURA AND WITHOUT STATUS MIGRAINOSUS, NOT INTRACTABLE: ICD-10-CM

## 2025-08-15 PROCEDURE — 3074F SYST BP LT 130 MM HG: CPT | Performed by: FAMILY MEDICINE

## 2025-08-15 PROCEDURE — 99213 OFFICE O/P EST LOW 20 MIN: CPT | Mod: 25 | Performed by: FAMILY MEDICINE

## 2025-08-15 PROCEDURE — 99395 PREV VISIT EST AGE 18-39: CPT | Performed by: FAMILY MEDICINE

## 2025-08-15 PROCEDURE — 1126F AMNT PAIN NOTED NONE PRSNT: CPT | Performed by: FAMILY MEDICINE

## 2025-08-15 PROCEDURE — 3078F DIAST BP <80 MM HG: CPT | Performed by: FAMILY MEDICINE

## 2025-08-15 PROCEDURE — 96127 BRIEF EMOTIONAL/BEHAV ASSMT: CPT | Performed by: FAMILY MEDICINE

## 2025-08-15 PROCEDURE — G2211 COMPLEX E/M VISIT ADD ON: HCPCS | Performed by: FAMILY MEDICINE

## 2025-08-15 RX ORDER — SUMATRIPTAN 50 MG/1
50 TABLET, FILM COATED ORAL
Qty: 15 TABLET | Refills: 0 | Status: SHIPPED | OUTPATIENT
Start: 2025-08-15 | End: 2025-08-15

## 2025-08-15 RX ORDER — ETHYNODIOL DIACETATE AND ETHINYL ESTRADIOL 1 MG-35MCG
1 KIT ORAL DAILY
Qty: 112 TABLET | Refills: 3 | Status: SHIPPED | OUTPATIENT
Start: 2025-08-15

## 2025-08-15 RX ORDER — ONDANSETRON 4 MG/1
4 TABLET, ORALLY DISINTEGRATING ORAL EVERY 8 HOURS PRN
Qty: 15 TABLET | Refills: 0 | Status: SHIPPED | OUTPATIENT
Start: 2025-08-15 | End: 2025-08-15

## 2025-08-15 RX ORDER — ONDANSETRON 4 MG/1
4 TABLET, ORALLY DISINTEGRATING ORAL EVERY 8 HOURS PRN
Qty: 15 TABLET | Refills: 3 | Status: SHIPPED | OUTPATIENT
Start: 2025-08-15

## 2025-08-15 RX ORDER — SUMATRIPTAN 50 MG/1
50 TABLET, FILM COATED ORAL
Qty: 15 TABLET | Refills: 3 | Status: SHIPPED | OUTPATIENT
Start: 2025-08-15

## 2025-08-15 SDOH — HEALTH STABILITY: PHYSICAL HEALTH: ON AVERAGE, HOW MANY DAYS PER WEEK DO YOU ENGAGE IN MODERATE TO STRENUOUS EXERCISE (LIKE A BRISK WALK)?: 3 DAYS

## 2025-08-15 ASSESSMENT — ASTHMA QUESTIONNAIRES
QUESTION_5 LAST FOUR WEEKS HOW WOULD YOU RATE YOUR ASTHMA CONTROL: WELL CONTROLLED
ACT_TOTALSCORE: 19
QUESTION_1 LAST FOUR WEEKS HOW MUCH OF THE TIME DID YOUR ASTHMA KEEP YOU FROM GETTING AS MUCH DONE AT WORK, SCHOOL OR AT HOME: A LITTLE OF THE TIME
QUESTION_3 LAST FOUR WEEKS HOW OFTEN DID YOUR ASTHMA SYMPTOMS (WHEEZING, COUGHING, SHORTNESS OF BREATH, CHEST TIGHTNESS OR PAIN) WAKE YOU UP AT NIGHT OR EARLIER THAN USUAL IN THE MORNING: ONCE OR TWICE
QUESTION_4 LAST FOUR WEEKS HOW OFTEN HAVE YOU USED YOUR RESCUE INHALER OR NEBULIZER MEDICATION (SUCH AS ALBUTEROL): TWO OR THREE TIMES PER WEEK
QUESTION_2 LAST FOUR WEEKS HOW OFTEN HAVE YOU HAD SHORTNESS OF BREATH: ONCE OR TWICE A WEEK

## 2025-08-15 ASSESSMENT — PATIENT HEALTH QUESTIONNAIRE - PHQ9
SUM OF ALL RESPONSES TO PHQ QUESTIONS 1-9: 2
SUM OF ALL RESPONSES TO PHQ QUESTIONS 1-9: 2
10. IF YOU CHECKED OFF ANY PROBLEMS, HOW DIFFICULT HAVE THESE PROBLEMS MADE IT FOR YOU TO DO YOUR WORK, TAKE CARE OF THINGS AT HOME, OR GET ALONG WITH OTHER PEOPLE: NOT DIFFICULT AT ALL

## 2025-08-15 ASSESSMENT — SOCIAL DETERMINANTS OF HEALTH (SDOH): HOW OFTEN DO YOU GET TOGETHER WITH FRIENDS OR RELATIVES?: ONCE A WEEK

## 2025-08-15 ASSESSMENT — PAIN SCALES - GENERAL: PAINLEVEL_OUTOF10: NO PAIN (0)

## (undated) DEVICE — LINEN BABY BLANKET 5434

## (undated) DEVICE — PACK C-SECTION LF PL15OTA83B

## (undated) DEVICE — ADHESIVE SWIFTSET 0.8ML OCTYL SS6

## (undated) DEVICE — PREP CHLORAPREP 26ML TINTED ORANGE  260815

## (undated) DEVICE — DRAPE U-POUCH 34X29" 1067

## (undated) DEVICE — GLOVE PROTEXIS MICRO 5.5  2D73PM55

## (undated) DEVICE — STOCKING SLEEVE COMPRESSION CALF MED

## (undated) DEVICE — SU WND CLOSURE VLOC 90 ABS 4-0 18" P-12 VLOCM0023

## (undated) DEVICE — SU PLAIN 3-0 CT 27" 852H

## (undated) DEVICE — Device

## (undated) DEVICE — CATH TRAY FOLEY SURESTEP 16FR W/URINE MTR STATLK LF A303416A

## (undated) DEVICE — SU MONOCRYL 0 CT-1 27" Y340H

## (undated) DEVICE — BLADE CLIPPER 4406

## (undated) DEVICE — SOL WATER IRRIG 1000ML BOTTLE 07139-09

## (undated) DEVICE — SU VICRYL 2-0 CT-1 36" UND J945H

## (undated) DEVICE — SU VICRYL 0 CT-1 36" J946H

## (undated) DEVICE — LABEL MEDICATION SYSTEM  3304

## (undated) DEVICE — GLOVE PROTEXIS BLUE W/NEU-THERA 6.0  2D73EB60

## (undated) DEVICE — SOL NACL 0.9% IRRIG 1000ML BOTTLE 07138-09

## (undated) RX ORDER — DEXAMETHASONE SODIUM PHOSPHATE 4 MG/ML
INJECTION, SOLUTION INTRA-ARTICULAR; INTRALESIONAL; INTRAMUSCULAR; INTRAVENOUS; SOFT TISSUE
Status: DISPENSED
Start: 2019-03-29

## (undated) RX ORDER — ONDANSETRON 2 MG/ML
INJECTION INTRAMUSCULAR; INTRAVENOUS
Status: DISPENSED
Start: 2019-03-29

## (undated) RX ORDER — EPINEPHRINE 1 MG/ML(1)
AMPUL (ML) INJECTION
Status: DISPENSED
Start: 2019-03-29

## (undated) RX ORDER — OXYTOCIN/0.9 % SODIUM CHLORIDE 30/500 ML
PLASTIC BAG, INJECTION (ML) INTRAVENOUS
Status: DISPENSED
Start: 2017-03-24

## (undated) RX ORDER — LIDOCAINE HYDROCHLORIDE 10 MG/ML
INJECTION, SOLUTION EPIDURAL; INFILTRATION; INTRACAUDAL; PERINEURAL
Status: DISPENSED
Start: 2017-03-24

## (undated) RX ORDER — PHENYLEPHRINE HCL IN 0.9% NACL 1 MG/10 ML
SYRINGE (ML) INTRAVENOUS
Status: DISPENSED
Start: 2019-03-29

## (undated) RX ORDER — OXYTOCIN/0.9 % SODIUM CHLORIDE 30/500 ML
PLASTIC BAG, INJECTION (ML) INTRAVENOUS
Status: DISPENSED
Start: 2019-03-29

## (undated) RX ORDER — BUPIVACAINE HYDROCHLORIDE 7.5 MG/ML
INJECTION, SOLUTION INTRASPINAL
Status: DISPENSED
Start: 2019-03-29

## (undated) RX ORDER — EPHEDRINE SULFATE 50 MG/ML
INJECTION, SOLUTION INTRAMUSCULAR; INTRAVENOUS; SUBCUTANEOUS
Status: DISPENSED
Start: 2019-03-29

## (undated) RX ORDER — KETOROLAC TROMETHAMINE 30 MG/ML
INJECTION, SOLUTION INTRAMUSCULAR; INTRAVENOUS
Status: DISPENSED
Start: 2019-03-29

## (undated) RX ORDER — MORPHINE SULFATE 1 MG/ML
INJECTION, SOLUTION EPIDURAL; INTRATHECAL; INTRAVENOUS
Status: DISPENSED
Start: 2017-03-24

## (undated) RX ORDER — DEXAMETHASONE SODIUM PHOSPHATE 4 MG/ML
INJECTION, SOLUTION INTRA-ARTICULAR; INTRALESIONAL; INTRAMUSCULAR; INTRAVENOUS; SOFT TISSUE
Status: DISPENSED
Start: 2017-03-24

## (undated) RX ORDER — MORPHINE SULFATE 1 MG/ML
INJECTION, SOLUTION EPIDURAL; INTRATHECAL; INTRAVENOUS
Status: DISPENSED
Start: 2019-03-29

## (undated) RX ORDER — ONDANSETRON 2 MG/ML
INJECTION INTRAMUSCULAR; INTRAVENOUS
Status: DISPENSED
Start: 2017-03-24

## (undated) RX ORDER — FENTANYL CITRATE 50 UG/ML
INJECTION, SOLUTION INTRAMUSCULAR; INTRAVENOUS
Status: DISPENSED
Start: 2017-03-24